# Patient Record
Sex: FEMALE | Race: BLACK OR AFRICAN AMERICAN | Employment: OTHER | URBAN - METROPOLITAN AREA
[De-identification: names, ages, dates, MRNs, and addresses within clinical notes are randomized per-mention and may not be internally consistent; named-entity substitution may affect disease eponyms.]

---

## 2017-01-23 ENCOUNTER — HOSPITAL ENCOUNTER (OUTPATIENT)
Dept: MAMMOGRAPHY | Age: 67
Discharge: HOME | End: 2017-01-23
Attending: FAMILY MEDICINE
Payer: MEDICARE

## 2017-01-23 DIAGNOSIS — Z12.31 VISIT FOR SCREENING MAMMOGRAM: ICD-10-CM

## 2017-01-23 PROCEDURE — 77067 SCR MAMMO BI INCL CAD: CPT

## 2017-07-24 ENCOUNTER — OFFICE VISIT (OUTPATIENT)
Dept: FAMILY MEDICINE CLINIC | Age: 67
End: 2017-07-24

## 2017-07-24 VITALS
SYSTOLIC BLOOD PRESSURE: 173 MMHG | RESPIRATION RATE: 16 BRPM | BODY MASS INDEX: 34.78 KG/M2 | OXYGEN SATURATION: 96 % | HEART RATE: 82 BPM | HEIGHT: 62 IN | WEIGHT: 189 LBS | DIASTOLIC BLOOD PRESSURE: 93 MMHG

## 2017-07-24 DIAGNOSIS — R03.0 WHITE COAT SYNDROME WITHOUT DIAGNOSIS OF HYPERTENSION: ICD-10-CM

## 2017-07-24 DIAGNOSIS — Z13.31 SCREENING FOR DEPRESSION: ICD-10-CM

## 2017-07-24 DIAGNOSIS — E66.9 OBESITY, CLASS I, BMI 30-34.9: ICD-10-CM

## 2017-07-24 DIAGNOSIS — Z00.00 ROUTINE GENERAL MEDICAL EXAMINATION AT A HEALTH CARE FACILITY: Primary | ICD-10-CM

## 2017-07-24 DIAGNOSIS — Z13.39 SCREENING FOR ALCOHOLISM: ICD-10-CM

## 2017-07-24 DIAGNOSIS — Z00.00 HEALTH CARE MAINTENANCE: ICD-10-CM

## 2017-07-24 DIAGNOSIS — E78.5 DYSLIPIDEMIA: ICD-10-CM

## 2017-07-24 NOTE — PROGRESS NOTES
[address] Visit   Patient ID:   Sakshi Acuna is a 79 y.o. female. Assessment/Plan:    Francisco Harris was seen today for establish care and labs. Diagnoses and all orders for this visit:    Routine general medical examination at a health care facility  Screening for alcoholism  Screening for depression  -     Depression Screen Annual  Sakshi Acuna was referred to [employer] today and given information packet. White coat syndrome without diagnosis of hypertension  Reviewed home readings since 2014 which are consistently less than 140/90. No medication needed. Asked to bring home cuff in next visit to compare to office cuff  -     CBC W/O DIFF  -     METABOLIC PANEL, COMPREHENSIVE    Dyslipidemia  Update labs as ordered  -     LIPID PANEL    Health care maintenance  -     CBC W/O DIFF  -     METABOLIC PANEL, COMPREHENSIVE    Obesity, Class I, BMI 30-34.9  Wt down. Continue lifestyle modification       Next visit: labs, check bp with home and office cuff    Counselled pt on:  Patient health concerns, plan as outlined in patient instructions and above. Patient was offered a choice/choices in the treatment plan today. Patient expresses understanding of the plan and agrees with recommendations. Patient Instructions     TODAY, please go to:   CHECK OUT    LAB    Please schedule the following appointments at CHECK OUT:  · Blood pressure and lab follow up with Dr. Hermelinda Bond in approximately 4 weeks or next availabl  _____________________     Today's Plan:  · To lab  · See below  _____________________     Review your health maintenance below. Make plans to return and address anything that is due or will be due soon. Health Maintenance Due   Topic Date Due    DTaP/Tdap/Td  (1 - Tdap) 01/02/2007    Annual Well Visit  06/02/2017    Glaucoma Screening   06/08/2017         _____________________     Are you stressed out? Overwhelmed? In pain?   Feeling disconnected? Consider MINDFULNESS. ..  [email or web address]  _____________________     If you need to get your labs done at [facility], visit this site to find a convenient location: [email or web address]      Eye Exam  Our records show that you are due for an annual eye exam. If you have diabetes, make sure your eye doctor knows so that they take a close look at the back of your eye (retina). If you have not had this done within the year, please schedule to see your eye doctor. After the visit, have your doctor fax us documentation to this office. [address] [facility] [place] -[phone]. If you have already had this done, let us know so we can request it or have your doctor fax us documentation to this office. [address] [facility] [place] -[phone]. Medicare Wellness Visit, Female    The best way to live healthy is to have a healthy lifestyle by eating a well-balanced diet, exercising regularly, limiting alcohol and stopping smoking. Regular physical exams and screening tests are another way to keep healthy. Preventive exams provided by your health care provider can find health problems before they become diseases or illnesses. Preventive services including immunizations, screening tests, monitoring and exams can help you take care of your own health. All people over age 72 should have a pneumovax  and and a prevnar shot to prevent pneumonia. These are once in a lifetime unless you and your provider decide differently. All people over 65 should have a yearly flu shot and a tetanus vaccine every 10 years. A bone mass density to screen for osteoporosis or thinning of the bones should be done every 2 years after 65. Screening for diabetes mellitus with a blood sugar test should be done every year.     Glaucoma is a disease of the eye due to increased ocular pressure that can lead to blindness and it should be done every year by an eye professional.    Cardiovascular screening tests that check for elevated lipids (fatty part of blood) which can lead to heart disease and strokes should be done every 5 years. Colorectal screening that evaluates for blood or polyps in your colon should be done yearly as a stool test or every five years as a flexible sigmoidoscope or every 10 years as a colonoscopy up to age 76. Breast cancer screening with a mammogram is recommended biennially  for women age 54-69. Screening for cervical cancer with a pap smear and pelvic exam is recommended for women after age 72 years every 2 years up to age 79 or when the provider and patient decide to stop. If there is a history of cervical abnormalities or other increased risk for cancer then the test is recommended yearly. Hepatitis C screening is also recommended for anyone born between [birth year] through [address]. A shingles vaccine is also recommended once in a lifetime after age 61. Your Medicare Wellness Exam is recommended annually. Here is a list of your current Health Maintenance items with a due date:  Health Maintenance Due   Topic Date Due    DTaP/Tdap/Td  (1 - Tdap) 01/02/2007    Annual Well Visit  06/02/2017    Glaucoma Screening   06/08/2017         Subjective:   HPI:  Sin Zuleta is a 79 y.o. female being seen for:   Chief Complaint   Patient presents with   [facility]     stated that she started taking a red yeast rice for cholesterol and wants to check her cholesterol. Establish Care  Past medical history, surgical history, social history, family history, medications, allergies reviewed and updated. See below for more detail. · BP usually high at MD.   · Since last visit add red yeast rice, walking, dietary changes, cut back on sugar. · Has a new BP cuff about 2 years.      Screening and Prevention Due:  Health Maintenance Due   Topic Date Due    DTaP/Tdap/Td series (1 - Tdap) 01/02/2007    MEDICARE YEARLY EXAM  06/02/2017    GLAUCOMA SCREENING Q2Y  06/08/2017        Review of Systems  Otherwise as noted in HPI    Active Problem List:  Patient Active Problem List   Diagnosis Code    Dyslipidemia E78.5    Obesity, Class I, BMI 30-34.9 E66.9    White coat syndrome without diagnosis of hypertension R03.0     ? Objective:     Visit Vitals    BP (!) 173/93 (BP 1 Location: Left arm, BP Patient Position: Sitting)    Pulse 82    Resp 16    Ht 5' 2\" (1.575 m)    Wt 189 lb (85.7 kg)    SpO2 96%    BMI 34.57 kg/m2     Wt Readings from Last 3 Encounters:   07/24/17 189 lb (85.7 kg)   06/01/16 193 lb 3.2 oz (87.6 kg)   09/09/15 190 lb (86.2 kg)     BP Readings from Last 3 Encounters:   07/24/17 (!) 173/93   06/01/16 (!) 174/95   09/09/15 (!) 143/91     PHQ over the last two weeks 7/24/2017   Little interest or pleasure in doing things Not at all   Feeling down, depressed or hopeless Not at all   Total Score PHQ 2 0       Physical Exam   Constitutional: She appears well-developed and well-nourished. No distress. Cardiovascular: Normal rate, regular rhythm and normal heart sounds. Exam reveals no gallop and no friction rub. No murmur heard. Pulmonary/Chest: Effort normal and breath sounds normal. No respiratory distress. She has no wheezes. She has no rales. Neurological: She is alert. Psychiatric: She has a normal mood and affect. Her behavior is normal.     No Known Allergies  Prior to Admission medications    Medication Sig Start Date End Date Taking? Authorizing Provider   CALCIUM CARBONATE (CALCIUM 300 PO) Take  by mouth. Yes Historical Provider   RED YEAST RICE PO Take  by mouth. Yes Historical Provider   LACTOBACILLUS ACIDOPHILUS (PROBIOTIC PO) Take  by mouth. Yes Historical Provider   multivitamin (ONE A DAY) tablet Take 1 Tab by mouth daily. Has extra 1000 units of Vitamin D. Yes Historical Provider   cholecalciferol, vitamin d3, (VITAMIN D) 1,000 unit tablet Take 1,000 Units by mouth daily. Yes Historical Provider     . Rosio Robles Social History     Social History    Marital status:      Spouse name: Lisa Martinez. 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 fmr . Multiple brothers and sisters           Past Medical History:   Diagnosis Date    Dyslipidemia 2012    History of acute renal failure 2012    History of chickenpox     History of measles childhood    History of mumps childhood    Obesity, Class I, BMI 30-34.9 2012    White coat syndrome without diagnosis of hypertension     exacerbated by stress       Past Surgical History:   Procedure Laterality Date    BIOPSY BREAST Right 2011    benign     COLONOSCOPY  3/29/2011         HX TOTAL ABDOMINAL HYSTERECTOMY      with Unilateral oopherectomy, b/l salpingectomy due to ectopic pregnancy. OB History      Para Term  AB Living    2    2 0    SAB TAB Ectopic Molar Multiple Live Births      2   0        Obstetric Comments    S/p hyst d/t ectopic  Has one ovaty. H/o abnl pap: per pt, no            Family History   Problem Relation Age of Onset    Heart Disease Mother     Diabetes Mother     Stroke Father 37     heavy alcohol    Liver Disease Brother      cirrhosis    Cancer Neg Hx        . ._____________________   . Arbutus Ring   This is a Subsequent Medicare Annual Wellness Visit providing Personalized Prevention Plan Services (PPPS) (Performed 12 months after initial AWV and PPPS )    I have reviewed the patient's medical history in detail and updated the computerized patient record. History     Past Medical History:   Diagnosis Date    Dyslipidemia 6/19/2012    History of acute renal failure 06/19/2012    History of chickenpox     History of measles childhood    History of mumps childhood    Obesity, Class I, BMI 30-34.9 6/19/2012    White coat syndrome without diagnosis of hypertension 2005    exacerbated by stress      Past Surgical History:   Procedure Laterality Date    BIOPSY BREAST Right 12/06/2011    benign     COLONOSCOPY  3/29/2011         HX TOTAL ABDOMINAL HYSTERECTOMY  1974    with Unilateral oopherectomy, b/l salpingectomy due to ectopic pregnancy. Current Outpatient Prescriptions   Medication Sig Dispense Refill    CALCIUM CARBONATE (CALCIUM 300 PO) Take  by mouth.  RED YEAST RICE PO Take  by mouth.  LACTOBACILLUS ACIDOPHILUS (PROBIOTIC PO) Take  by mouth.  multivitamin (ONE A DAY) tablet Take 1 Tab by mouth daily. Has extra 1000 units of Vitamin D.      cholecalciferol, vitamin d3, (VITAMIN D) 1,000 unit tablet Take 1,000 Units by mouth daily.        No Known Allergies  Family History   Problem Relation Age of Onset    Heart Disease Mother     Diabetes Mother     Stroke Father 37     heavy alcohol    Liver Disease Brother      cirrhosis    Cancer Neg Hx      Social History   Substance Use Topics    Smoking status: Never Smoker    Smokeless tobacco: Never Used    Alcohol use Yes      Comment: occassionally, 1 drink, 1-2 times a year     Patient Active Problem List   Diagnosis Code    Dyslipidemia E78.5    Obesity, Class I, BMI 30-34.9 E66.9    White coat syndrome without diagnosis of hypertension R03.0       Depression Risk Factor Screening:     PHQ over the last two weeks 7/24/2017   Little interest or pleasure in doing things Not at all   Feeling down, depressed or hopeless Not at all   Total Score PHQ 2 0 Alcohol Risk Factor Screening:     History   Alcohol Use    Yes     Comment: occassionally, 1 drink, 1-2 times a year       Functional Ability and Level of Safety:     Hearing Loss   Denies hearing loss      Activities of Daily Living     ADL Assessment 7/24/2017   Feeding yourself No Help Needed   Getting from bed to chair No Help Needed   Getting dressed No Help Needed   Bathing or showering No Help Needed   Walk across the room (includes cane/walker) No Help Needed   Using the telphone No Help Needed   Taking your medications No Help Needed   Preparing meals No Help Needed   Managing money (expenses/bills) No Help Needed   Moderately strenuous housework (laundry) No Help Needed   Shopping for personal items (toiletries/medicines) No Help Needed   Shopping for groceries No Help Needed   Driving No Help Needed   Climbing a flight of stairs No Help Needed   Getting to places beyond walking distances No Help Needed       Fall Risk     Fall Risk Assessment, last 12 mths 7/24/2017   Able to walk? Yes   Fall in past 12 months? No     Abuse Screen     Abuse Screening Questionnaire 7/24/2017   Do you ever feel afraid of your partner? N   Are you in a relationship with someone who physically or mentally threatens you? N   Is it safe for you to go home? Y       Review of Systems   See above    Physical Examination     Evaluation of Cognitive Function:  Mood/affect:  Normal   Appearance: well groomed, casually dressed   No family members present. Patient Care Team:  Johnnie Jimenez MD as PCP - General (Family Practice)    Advice/Referrals/Counseling   Education and counseling provided:  · HM reviewed with due dates: See orders and patient instructions for more  · Personalized Health Advice provided  · Risk factors and management reviewed   · Depression Screening   · Smoking Cessation if applicable  · Alcohol Screening  · ACP Counseling given with patient consent    Health Maintenance Due   Topic Date Due    DTaP/Tdap/Td  (1 - Tdap) 01/02/2007    Annual Well Visit  06/02/2017    Glaucoma Screening   06/08/2017   td   Will see a new eye doctor this year.      .  Immunization History   Administered Date(s) Administered    Pneumococcal Conjugate (PCV-13) 06/10/2015    Pneumococcal Polysaccharide (PPSV-23) 06/01/2016    TD Vaccine 01/01/2007         Assessment/Plan     See above

## 2017-07-24 NOTE — ACP (ADVANCE CARE PLANNING)
Advance Care Planning (ACP) Provider Conversation Snapshot    Date of ACP Conversation: 07/24/17  Persons included in Conversation:  patient  Length of ACP Conversation in minutes:  <16 minutes (Non-Billable)    Authorized Decision Maker (if patient is incapable of making informed decisions):    This person is:             For Patients with Decision Making Capacity:   Values/Goals: Exploration of values, goals, and preferences if recovery is not expected, even with continued medical treatment in the event of:  Imminent death  Severe, permanent brain injury    Conversation Outcomes / Follow-Up Plan:   Recommended completion of Advance Directive form after review of ACP materials and conversation with prospective healthcare agent   Referral made for ACP follow-up assistance to:  Certified ACP Facilitator

## 2017-07-24 NOTE — PROGRESS NOTES
Chief Complaint   Patient presents with   [facility] Varg[facility]     1. Have you been to the ER, urgent care clinic since your last visit? Hospitalized since your last visit? No     2. Have you seen or consulted any other health care providers outside of the [address] [place] [address] since your last visit? Include any pap smears or colon screening. No     The patient was counseled on the dangers of tobacco use, and was advised never to start. Reviewed strategies to maximize success, including never to start. Health Maintenance Due   Topic Date Due    DTaP/Tdap/Td series (1 - Tdap) Discussed with patient today and advised to follow up.    01/02/2007    MEDICARE YEARLY EXAM Discussed with patient today and advised to follow up.    06/02/2017    GLAUCOMA SCREENING Q2Y  Discussed with patient today and advised to follow up.    06/08/2017     ACP is not on file, advised to return.

## 2017-07-24 NOTE — PATIENT INSTRUCTIONS
TODAY, please go to:   CHECK OUT    LAB    Please schedule the following appointments at [address] [place] [address]:  · Blood pressure and lab follow up with Dr. Eliecer Franklin in approximately 4 weeks or next available  · [facility] appointment with Nurse Navigator in next 1-2 months  _____________________     Today's Plan:  · To lab  · See below  _____________________     Review your health maintenance below. Make plans to return and address anything that is due or will be due soon. Health Maintenance Due   Topic Date Due    DTaP/Tdap/Td  (1 - Tdap) 01/02/2007    Annual Well Visit  06/02/2017    Glaucoma Screening   06/08/2017         _____________________     Are you stressed out? Overwhelmed? In pain? Feeling disconnected? Consider MINDFULNESS. ..  [email or web address]  _____________________     If you need to get your labs done at [facility], visit this site to find a convenient location: [email or web address]      Eye Exam  Our records show that you are due for an annual eye exam. If you have diabetes, make sure your eye doctor knows so that they take a close look at the back of your eye (retina). If you have not had this done within the year, please schedule to see your eye doctor. After the visit, have your doctor fax us documentation to this office. [address] [phone]. If you have already had this done, let us know so we can request it or have your doctor fax us documentation to this office. [address] [phone]. Medicare Wellness Visit, Female    The best way to live healthy is to have a healthy lifestyle by eating a well-balanced diet, exercising regularly, limiting alcohol and stopping smoking. Regular physical exams and screening tests are another way to keep healthy. Preventive exams provided by your health care provider can find health problems before they become diseases or illnesses. Preventive services including immunizations, screening tests, monitoring and exams can help you take care of your own health. All people over age 72 should have a pneumovax  and and a prevnar shot to prevent pneumonia. These are once in a lifetime unless you and your provider decide differently. All people over 65 should have a yearly flu shot and a tetanus vaccine every 10 years. A bone mass density to screen for osteoporosis or thinning of the bones should be done every 2 years after 65. Screening for diabetes mellitus with a blood sugar test should be done every year. Glaucoma is a disease of the eye due to increased ocular pressure that can lead to blindness and it should be done every year by an eye professional.    Cardiovascular screening tests that check for elevated lipids (fatty part of blood) which can lead to heart disease and strokes should be done every 5 years. Colorectal screening that evaluates for blood or polyps in your colon should be done yearly as a stool test or every five years as a flexible sigmoidoscope or every 10 years as a colonoscopy up to age 76. Breast cancer screening with a mammogram is recommended biennially  for women age 54-69. Screening for cervical cancer with a pap smear and pelvic exam is recommended for women after age 72 years every 2 years up to age 79 or when the provider and patient decide to stop. If there is a history of cervical abnormalities or other increased risk for cancer then the test is recommended yearly. Hepatitis C screening is also recommended for anyone born between [address] [place] [address] through [place] [address]. A shingles vaccine is also recommended once in a lifetime after age 61. Your Medicare Wellness Exam is recommended annually.     Here is a list of your current Health Maintenance items with a due date:  Health Maintenance Due   Topic Date Due    DTaP/Tdap/Td  (1 - Tdap) 01/02/2007    Annual Well Visit  06/02/2017    Glaucoma Screening 06/08/2017

## 2017-07-24 NOTE — MR AVS SNAPSHOT
Visit Information Date & Time Provider Department Dept. Phone Encounter #  
 7/24/2017  8:00 AM Italo Cleo Ashia Saab MD [place] [phone] [id number] Upcoming Health Maintenance Date Due DTaP/Tdap/Td series (1 - Tdap) 1/2/2007 MEDICARE YEARLY EXAM 6/2/2017 GLAUCOMA SCREENING Q2Y 6/8/2017 INFLUENZA AGE 9 TO ADULT 8/1/2017 BREAST CANCER SCRN MAMMOGRAM 1/23/2019 COLONOSCOPY 3/29/2021 Allergies as of 7/24/2017  Review Complete On: 7/24/2017 By: Lucy Benitez. Ashia Saab MD  
 No Known Allergies Current Immunizations  Reviewed on 6/10/2015 Name Date Pneumococcal Conjugate (PCV-13) 6/10/2015 Pneumococcal Polysaccharide (PPSV-23) 6/1/2016 TD Vaccine 1/1/2007 Not reviewed this visit You Were Diagnosed With   
  
 Codes Comments Routine general medical examination at a health care facility    -  Primary ICD-10-CM: Z00.00 ICD-9-CM: V70.0 White coat syndrome without diagnosis of hypertension     ICD-10-CM: R03.0 ICD-9-CM: 796.2 Screening for alcoholism     ICD-10-CM: Z13.89 ICD-9-CM: V79.1 Screening for depression     ICD-10-CM: Z13.89 ICD-9-CM: V79.0 Dyslipidemia     ICD-10-CM: E78.5 ICD-9-CM: 272.4 Health care maintenance     ICD-10-CM: Z00.00 ICD-9-CM: V70.0 Obesity, Class I, BMI 30-34.9     ICD-10-CM: E66.9 ICD-9-CM: 278.00 Vitals BP Pulse Resp Height(growth percentile) Weight(growth percentile) LMP  
 (!) 173/93 (BP 1 Location: Left arm, BP Patient Position: Sitting) 82 16 5' 2\" (1.575 m) 189 lb (85.7 kg) 01[birth year]01[birth year]1974 SpO2 BMI OB Status Smoking Status 96% 34.57 kg/m2 Hysterectomy Never Smoker Vitals History BMI and BSA Data Body Mass Index Body Surface Area 34.57 kg/m 2 1.94 m 2 Preferred Pharmacy Pharmacy Name Phone [id number] - [facility] [address] [phone] Your Updated Medication List  
  
   
This list is accurate as of: 7/24/17  9:07 AM.  Always use your most recent med list.  
  
  
  
  
 CALCIUM 300 PO Take  by mouth.  
  
 multivitamin tablet Commonly known as:  ONE A DAY Take 1 Tab by mouth daily. Has extra 1000 units of Vitamin D. PROBIOTIC PO Take  by mouth. RED YEAST RICE PO Take  by mouth. VITAMIN D3 1,000 unit tablet Generic drug:  cholecalciferol Take 1,000 Units by mouth daily. We Performed the Following CBC W/O DIFF [76097 CPT(R)] [address] [[id number]] LIPID PANEL [36854 CPT(R)] METABOLIC PANEL, COMPREHENSIVE [18543 CPT(R)] Patient Instructions TODAY, please go to: CHECK OUT  
 LAB Please schedule the following appointments at CHECK OUT: 
· Blood pressure and lab follow up with Dr. Kimmie Novoa in approximately 4 weeks or next available · [facility] appointment with Nurse Navigator in next 1-2 months 
_____________________ Today's Plan: · To lab · See below 
_____________________ Review your health maintenance below. Make plans to return and address anything that is due or will be due soon. Health Maintenance Due Topic Date Due  
 DTaP/Tdap/Td  (1 - Tdap) 01/02/2007 [address] [place] [address] Annual Well Visit  06/02/2017  Glaucoma Screening   06/08/2017  
 
 
 
_____________________ Are you stressed out? Overwhelmed? In pain? Feeling disconnected? Consider MINDFULNESS. .. 
[email or web address] 
_____________________ If you need to get your labs done at [facility], visit this site to find a convenient location: [email or web address] Eye Exam 
Our records show that you are due for an annual eye exam. If you have diabetes, make sure your eye doctor knows so that they take a close look at the back of your eye (retina). If you have not had this done within the year, please schedule to see your eye doctor. After the visit, have your doctor fax us documentation to this office. [address] -[phone]. If you have already had this done, let us know so we can request it or have your doctor fax us documentation to this office. [address] -[phone]. Medicare Wellness Visit, Female The best way to live healthy is to have a healthy lifestyle by eating a well-balanced diet, exercising regularly, limiting alcohol and stopping smoking. Regular physical exams and screening tests are another way to keep healthy. Preventive exams provided by your health care provider can find health problems before they become diseases or illnesses. Preventive services including immunizations, screening tests, monitoring and exams can help you take care of your own health. All people over age 72 should have a pneumovax  and and a prevnar shot to prevent pneumonia. These are once in a lifetime unless you and your provider decide differently. All people over 65 should have a yearly flu shot and a tetanus vaccine every 10 years. A bone mass density to screen for osteoporosis or thinning of the bones should be done every 2 years after 65. Screening for diabetes mellitus with a blood sugar test should be done every year. Glaucoma is a disease of the eye due to increased ocular pressure that can lead to blindness and it should be done every year by an eye professional. 
 
Cardiovascular screening tests that check for elevated lipids (fatty part of blood) which can lead to heart disease and strokes should be done every 5 years. Colorectal screening that evaluates for blood or polyps in your colon should be done yearly as a stool test or every five years as a flexible sigmoidoscope or every 10 years as a colonoscopy up to age 76. Breast cancer screening with a mammogram is recommended biennially  for women age 54-69. Screening for cervical cancer with a pap smear and pelvic exam is recommended for women after age 72 years every 2 years up to age 79 or when the provider and patient decide to stop. If there is a history of cervical abnormalities or other increased risk for cancer then the test is recommended yearly. Hepatitis C screening is also recommended for anyone born between [birth year] through [address]. A shingles vaccine is also recommended once in a lifetime after age 61. Your Medicare Wellness Exam is recommended annually. Here is a list of your current Health Maintenance items with a due date: 
Health Maintenance Due Topic Date Due  
 DTaP/Tdap/Td  (1 - Tdap) 01/02/2007 [facility] Annual Well Visit  06/02/2017  Glaucoma Screening   06/08/2017 Introducing [facility] SERVICES! [employer] Saint introduces [employer] patient portal. Now you can access parts of your medical record, email your doctor's office, and request medication refills online. 1. In your internet browser, go to [email or web address] 2. Click on the First Time User? Click Here link in the Sign In box. You will see the New Member Sign Up page. 3. Enter your [employer] Access Code exactly as it appears below. You will not need to use this code after youve completed the sign-up process. If you do not sign up before the expiration date, you must request a new code. · [employer] Access Code: [email or web address] Expires: 10/22/2017  8:47 AM 
 
4. Enter the last four digits of your Social Security Number (xxxx) and Date of Birth (mm/dd/yyyy) as indicated and click Submit. You will be taken to the next sign-up page. 5. Create a [employer] ID. This will be your [employer] login ID and cannot be changed, so think of one that is secure and easy to remember. 6. Create a Leospheret password. You can change your password at any time. 7. Enter your Password Reset Question and Answer. This can be used at a later time if you forget your password. 8. Enter your e-mail address. You will receive e-mail notification when new information is available in [address] 9. Click Sign Up. You can now view and download portions of your medical record. 10. Click the Download Summary menu link to download a portable copy of your medical information. If you have questions, please visit the Frequently Asked Questions section of the [employer] website. Remember, [employer] is NOT to be used for urgent needs. For medical emergencies, dial 911. Now available from your iPhone and Android! Please provide this summary of care documentation to your next provider. Your primary care clinician is listed as Chica Mejia. If you have any questions after today's visit, please call [phone].

## 2017-07-25 LAB
ALBUMIN SERPL-MCNC: 4.3 G/DL (ref 3.6–4.8)
ALBUMIN/GLOB SERPL: 1.4 {RATIO} (ref 1.2–2.2)
ALP SERPL-CCNC: 100 IU/L (ref 39–117)
ALT SERPL-CCNC: 13 IU/L (ref 0–32)
AST SERPL-CCNC: 17 IU/L (ref 0–40)
BILIRUB SERPL-MCNC: 0.3 MG/DL (ref 0–1.2)
BUN SERPL-MCNC: 19 MG/DL (ref 8–27)
BUN/CREAT SERPL: 15 (ref 12–28)
CALCIUM SERPL-MCNC: 10.1 MG/DL (ref 8.7–10.3)
CHLORIDE SERPL-SCNC: 100 MMOL/L (ref 96–106)
CHOLEST SERPL-MCNC: 214 MG/DL (ref 100–199)
CO2 SERPL-SCNC: 26 MMOL/L (ref 18–29)
CREAT SERPL-MCNC: 1.27 MG/DL (ref 0.57–1)
ERYTHROCYTE [DISTWIDTH] IN BLOOD BY AUTOMATED COUNT: 15.7 % (ref 12.3–15.4)
GLOBULIN SER CALC-MCNC: 3.1 G/DL (ref 1.5–4.5)
GLUCOSE SERPL-MCNC: 92 MG/DL (ref 65–99)
HCT VFR BLD AUTO: 45.2 % (ref 34–46.6)
HDLC SERPL-MCNC: 56 MG/DL
HGB BLD-MCNC: 14.7 G/DL (ref 11.1–15.9)
INTERPRETATION, 910389: NORMAL
INTERPRETATION: NORMAL
LDLC SERPL CALC-MCNC: 135 MG/DL (ref 0–99)
MCH RBC QN AUTO: 27.5 PG (ref 26.6–33)
MCHC RBC AUTO-ENTMCNC: 32.5 G/DL (ref 31.5–35.7)
MCV RBC AUTO: 85 FL (ref 79–97)
PDF IMAGE, 910387: NORMAL
PLATELET # BLD AUTO: 182 X10E3/UL (ref 150–379)
POTASSIUM SERPL-SCNC: 4.8 MMOL/L (ref 3.5–5.2)
PROT SERPL-MCNC: 7.4 G/DL (ref 6–8.5)
RBC # BLD AUTO: 5.35 X10E6/UL (ref 3.77–5.28)
SODIUM SERPL-SCNC: 141 MMOL/L (ref 134–144)
TRIGL SERPL-MCNC: 116 MG/DL (ref 0–149)
VLDLC SERPL CALC-MCNC: 23 MG/DL (ref 5–40)
WBC # BLD AUTO: 5 X10E3/UL (ref 3.4–10.8)

## 2017-07-26 NOTE — PROGRESS NOTES
Will review results in detail at upcoming office visit. 9/19/2017  10:40 AM   Laverne Comment.  Calixto Peters MD       [address] [place] [address]

## 2017-09-19 ENCOUNTER — OFFICE VISIT (OUTPATIENT)
Dept: FAMILY MEDICINE CLINIC | Age: 67
End: 2017-09-19

## 2017-09-19 VITALS
HEIGHT: 62 IN | WEIGHT: 185 LBS | BODY MASS INDEX: 34.04 KG/M2 | OXYGEN SATURATION: 96 % | RESPIRATION RATE: 18 BRPM | HEART RATE: 73 BPM | SYSTOLIC BLOOD PRESSURE: 135 MMHG | DIASTOLIC BLOOD PRESSURE: 94 MMHG | TEMPERATURE: 97.6 F

## 2017-09-19 DIAGNOSIS — E78.5 DYSLIPIDEMIA: ICD-10-CM

## 2017-09-19 DIAGNOSIS — E66.9 OBESITY, CLASS I, BMI 30-34.9: ICD-10-CM

## 2017-09-19 DIAGNOSIS — R03.0 WHITE COAT SYNDROME WITHOUT DIAGNOSIS OF HYPERTENSION: ICD-10-CM

## 2017-09-19 DIAGNOSIS — N18.30 CKD (CHRONIC KIDNEY DISEASE) STAGE 3, GFR 30-59 ML/MIN (HCC): Primary | ICD-10-CM

## 2017-09-19 LAB
BILIRUB UR QL STRIP: NEGATIVE
GLUCOSE UR-MCNC: NEGATIVE MG/DL
KETONES P FAST UR STRIP-MCNC: NEGATIVE MG/DL
PH UR STRIP: 5 [PH] (ref 4.6–8)
PROT UR QL STRIP: NEGATIVE MG/DL
SP GR UR STRIP: 1.01 (ref 1–1.03)
UA UROBILINOGEN AMB POC: NORMAL (ref 0.2–1)
URINALYSIS CLARITY POC: CLEAR
URINALYSIS COLOR POC: NORMAL
URINE BLOOD POC: NEGATIVE
URINE LEUKOCYTES POC: NORMAL
URINE NITRITES POC: NEGATIVE

## 2017-09-19 NOTE — LETTER
9/19/2017 10:57 AM 
 
Ms. Rudy Jacobson [address] [address] Office Visit on 07/24/2017 Component Date Value Ref Range Status  WBC 07/24/2017 5.0  3.4 - 10.8 x10E3/uL Final  
 RBC 07/24/2017 5.35* 3.77 - 5.28 x10E6/uL Final  
 HGB 07/24/2017 14.7  11.1 - 15.9 g/dL Final  
 HCT 07/24/2017 45.2  34.0 - 46.6 % Final  
 MCV 07/24/2017 85  79 - 97 fL Final  
 MCH 07/24/2017 27.5  26.6 - 33.0 pg Final  
 MCHC 07/24/2017 32.5  31.5 - 35.7 g/dL Final  
 RDW 07/24/2017 15.7* 12.3 - 15.4 % Final  
 PLATELET 35/77/1714 430  150 - 379 x10E3/uL Final  
 Glucose 07/24/2017 92  65 - 99 mg/dL Final  
 BUN 07/24/2017 19  8 - 27 mg/dL Final  
 Creatinine 07/24/2017 1.27* 0.57 - 1.00 mg/dL Final  
 GFR est non-AA 07/24/2017 44* >59 mL/min/1.73 Final  
 GFR est AA 07/24/2017 50* >59 mL/min/1.73 Final  
 BUN/Creatinine ratio 07/24/2017 15  12 - 28 Final  
 Sodium 07/24/2017 141  134 - 144 mmol/L Final  
 Potassium 07/24/2017 4.8  3.5 - 5.2 mmol/L Final  
 Chloride 07/24/2017 100  96 - 106 mmol/L Final  
 CO2 07/24/2017 26  18 - 29 mmol/L Final  
 Calcium 07/24/2017 10.1  8.7 - 10.3 mg/dL Final  
 Protein, total 07/24/2017 7.4  6.0 - 8.5 g/dL Final  
 Albumin 07/24/2017 4.3  3.6 - 4.8 g/dL Final  
 GLOBULIN, TOTAL 07/24/2017 3.1  1.5 - 4.5 g/dL Final  
 A-G Ratio 07/24/2017 1.4  1.2 - 2.2 Final  
 Bilirubin, total 07/24/2017 0.3  0.0 - 1.2 mg/dL Final  
 Alk.  phosphatase 07/24/2017 100  39 - 117 IU/L Final  
 AST (SGOT) 07/24/2017 17  0 - 40 IU/L Final  
 ALT (SGPT) 07/24/2017 13  0 - 32 IU/L Final  
 Cholesterol, total 07/24/2017 214* 100 - 199 mg/dL Final  
 Triglyceride 07/24/2017 116  0 - 149 mg/dL Final  
 HDL Cholesterol 07/24/2017 56  >39 mg/dL Final  
 VLDL, calculated 07/24/2017 23  5 - 40 mg/dL Final  
 LDL, calculated 07/24/2017 135* 0 - 99 mg/dL Final  
 INTERPRETATION 07/24/2017 NTAP   Final  
 PDF IMAGE 79/56/3540 Not applicable   Final  
  Interpretation 07/24/2017 Note   Final  
 
 
 
Legend: Use this to help understand your labs. You may not have all of these ordered · WBC- White blood cell count · HGB- Hemoglobin, red blood cell count · HCT- Hematocrit, red blood cell count · PLT- Platelets · Sodium, Potassium, Chloride, Magnesium- electrolytes · Creatinine, GFR- kidney function · AST, ALT, Alkaline phosphatase, bilirubin- liver and gallbladder · Lipase- pancreas · RPR- Syphilis test, STD 
· HIV, Gonorrhea, Chlamydia, Trichomonas- STDs · Candida- yeast infection · Nuswab- vaginal infections · Urinalysis- a quick test about your urine · Hemoglobin A1C- diabetes test 
· Glucose- blood sugar · HDL- \"Good\" Cholesterol. Goal >=40 
· LDL- \"Bad\" Cholesterol. Goal <100 · Triglycerides- Goal <150 · Vit D- Vitamin D level · TSH, FT3, FT4- thyroid tests · HCV Ab- test for Hepatitis C  
· Sincerely, 
 
 
Olegario Washington MD

## 2017-09-19 NOTE — PROGRESS NOTES
.. [address] [place] [address] Visit   09/19/2017  Patient ID: Jennifer Camejo is a 79 y.o. female. Assessment/Plan:    Diagnoses and all orders for this visit:    1. CKD (chronic kidney disease) stage 3, GFR 30-59 ml/min  Discussed today. Does not have essential HTN or DM. Stable CKD 2-3 for the last 2 years. Refer to nephrology for additional evaluation.   -     AMB POC URINALYSIS DIP STICK MANUAL W/O MICRO  -     REFERRAL TO NEPHROLOGY    2. White coat syndrome without diagnosis of hypertension  Elevated in office only. CTM home BP.     3. Obesity, Class I, BMI 30-34.9  Congratulated on weight loss    4. Dyslipidemia  Declines statin. Educated about food, physical activity, and lifestyle choices. Given handout on AHA, mediterranean and DASH diet recommendations. Counselled pt on Patient health concerns and plans. Patient was offered a choice/choices in the treatment plan today. Reviewed return precautions as appropriate. Patient expresses understanding of the plan and agrees with recommendations. See patient instructions for more. Patient Instructions   . Tamera [place]aks TODAY, please go to:   CHECK OUT    Urine sample    Please schedule the following appointments at CHECK OUT:  · Blood pressure follow up with Dr. Rebekah Mejia in March 2018    Today's Plan:  - Flu season is coming! Remember to get your flu vaccine! Subjective:   HPI:  Jennifer aCmejo is a 79 y.o. female being seen for:   Chief Complaint   Patient presents with    Blood Pressure Check     follow up     Results     labs        White coat HTN  · Home BPs, last 135/80s  · Thinks it is because she dislikes coming to doctor office  · She was briefly on ?hctz and did not feel well on it. · Never 140s SBP ot 90s DBP at home. · Does not want to do a statin  · Would like to focus on lifestyle change and exercise.    · 10 year ascvd 10.9%  · Chronic kidney disease  · Walks 1 hour twice per week    Screening and Prevention Due:  Health Maintenance Due   Topic Date Due    DTaP/Tdap/Td series (1 - Tdap) 01/02/2007    GLAUCOMA SCREENING Q2Y  06/08/2017    INFLUENZA AGE 9 TO ADULT  08/01/2017         Review of Systems  Otherwise as noted in HPI  ? Objective:     Visit Vitals    BP (!) 135/94    Pulse 73    Temp 97.6 °F (36.4 °C) (Oral)    Resp 18    Ht 5' 2\" (1.575 m)    Wt 185 lb (83.9 kg)    SpO2 96%    BMI 33.84 kg/m2     Wt Readings from Last 3 Encounters:   09/19/17 185 lb (83.9 kg)   07/24/17 189 lb (85.7 kg)   06/01/16 193 lb 3.2 oz (87.6 kg)     BP Readings from Last 3 Encounters:   09/19/17 (!) 135/94   07/24/17 (!) 173/93   06/01/16 (!) 174/95     PHQ over the last two weeks 9/19/2017   Little interest or pleasure in doing things Not at all   Feeling down, depressed or hopeless Not at all   Total Score PHQ 2 0       Physical Exam   Constitutional: She appears well-developed and well-nourished. No distress. Pulmonary/Chest: Effort normal. No respiratory distress. Neurological: She is alert. Psychiatric: She has a normal mood and affect. Her behavior is normal.       No Known Allergies  Prior to Admission medications    Medication Sig Start Date End Date Taking? Authorizing Provider   CALCIUM CARBONATE (CALCIUM 300 PO) Take  by mouth. Yes Historical Provider   RED YEAST RICE PO Take  by mouth. Yes Historical Provider   LACTOBACILLUS ACIDOPHILUS (PROBIOTIC PO) Take  by mouth. Yes Historical Provider   multivitamin (ONE A DAY) tablet Take 1 Tab by mouth daily. Has extra 1000 units of Vitamin D. Yes Historical Provider   cholecalciferol, vitamin d3, (VITAMIN D) 1,000 unit tablet Take 1,000 Units by mouth daily. Yes Historical Provider     Patient Active Problem List   Diagnosis Code    Dyslipidemia E78.5    Obesity, Class I, BMI 30-34.9 E66.9    White coat syndrome without diagnosis of hypertension R03.0     .   Family History   Problem Relation Age of Onset    Heart Disease Mother     Diabetes Mother     Stroke Father 37     heavy alcohol    Liver Disease Brother      cirrhosis    Cancer Neg Hx

## 2017-09-19 NOTE — LETTER
9/19/2017 11:43 AM 
 
Ms. Grace Klein [address] [address] Results for Dallas Rehman (MRN [id number]) as of 9/19/2017 11:20 Ref. Range 6/10/2015 15:41 9/9/2015 10:45 6/1/2016 10:47 7/24/2017 09:30 Sodium Latest Ref Range: 134 - 144 mmol/L 139 142 141 141 Potassium Latest Ref Range: 3.5 - 5.2 mmol/L 4.5 4.6 4.9 4.8 Chloride Latest Ref Range: 96 - 106 mmol/L 99 101 100 100 CO2 Latest Ref Range: 18 - 29 mmol/L 24 24 24 26 Glucose Latest Ref Range: 65 - 99 mg/dL 86 97 93 92 BUN Latest Ref Range: 8 - 27 mg/dL 20 16 13 19 Creatinine Latest Ref Range: 0.57 - 1.00 mg/dL 1.08 (H) 1.31 (H) 1.09 (H) 1.27 (H) BUN/Creatinine ratio Latest Ref Range: 12 - 28  19 12 12 15 Calcium Latest Ref Range: 8.7 - 10.3 mg/dL 10.5 (H) 10.3 10.0 10.1 GFR est non-AA Latest Ref Range: >59 mL/min/1.73 54 (L) 43 (L) 53 (L) 44 (L)  
GFR est AA Latest Ref Range: >59 mL/min/1.73 62 49 (L) 61 50 (L) Bilirubin, total Latest Ref Range: 0.0 - 1.2 mg/dL 0.3 0.5 0.4 0.3 Protein, total Latest Ref Range: 6.0 - 8.5 g/dL 7.8 7.7 7.5 7.4 Albumin Latest Ref Range: 3.6 - 4.8 g/dL 4.8 4.6 4.5 4.3 A-G Ratio Latest Ref Range: 1.2 - 2.2  1.6 1.5 1.5 1.4 ALT (SGPT) Latest Ref Range: 0 - 32 IU/L 16 9 11 13 AST Latest Ref Range: 0 - 40 IU/L 17 15 18 17 Alk. phosphatase Latest Ref Range: 39 - 117 IU/L 110 95 107 100 Triglyceride Latest Ref Range: 0 - 149 mg/dL 112 107 102 116 Cholesterol, total Latest Ref Range: 100 - 199 mg/dL 261 (H) 254 (H) 248 (H) 214 (H) HDL Cholesterol Latest Ref Range: >39 mg/dL 57 58 65 56 LDL, calculated Latest Ref Range: 0 - 99 mg/dL 182 (H) 175 (H) 163 (H) 135 (H) VLDL, calculated Latest Ref Range: 5 - 40 mg/dL 22 21 20 23

## 2017-09-19 NOTE — PROGRESS NOTES
Chief Complaint   Patient presents with    Blood Pressure Check     follow up     Results     labs      1. Have you been to the ER, urgent care clinic since your last visit? Hospitalized since your last visit? No     2. Have you seen or consulted any other health care providers outside of the [address] [place] [address] since your last visit? Include any pap smears or colon screening. No     The patient was counseled on the dangers of tobacco use, and was advised to never start. Reviewed strategies to maximize success, including never to start. Health Maintenance Due   Topic Date Due    DTaP/Tdap/Td series (1 - Tdap) Discussed with patient today and advised to follow up.    01/02/2007    GLAUCOMA SCREENING Q2Y Discussed with patient today and advised to follow up.    06/08/2017    INFLUENZA AGE 9 TO ADULT Discussed with patient today and advised to follow up.    08/01/2017     ACP is not on file, advised to return.

## 2017-09-19 NOTE — PATIENT INSTRUCTIONS
.. TODAY, please go to:   CHECK OUT    Urine sample    Please schedule the following appointments at CHECK OUT:  · Blood pressure follow up with Dr. Cameron Yoder in March 2018    Today's Plan:  - Flu season is coming! Remember to get your flu vaccine! Chronic Kidney Disease: Care Instructions  Your Care Instructions  Chronic kidney disease happens when your kidneys don't work as well as they should. Your kidneys have a few important jobs. They remove waste from your blood. This waste leaves your body in your urine. They also balance your body's fluids and chemicals. When your kidneys don't work well, extra waste and fluid can build up. This can poison the body and sometimes cause death. The most common causes of this disease are diabetes and high blood pressure. In some cases, the disease develops in 2 to 3 months. But it usually develops over many years. If you take medicine and make healthy changes to your lifestyle, you may be able to prevent the disease from getting worse. But if your kidney damage gets worse, you may need dialysis or a kidney transplant. Dialysis uses a machine to filter waste from the blood. A transplant is surgery to give you a healthy kidney from another person. Follow-up care is a key part of your treatment and safety. Be sure to make and go to all appointments, and call your doctor if you are having problems. It's also a good idea to know your test results and keep a list of the medicines you take. How can you care for yourself at home? Treatments and appointments  · Be safe with medicines. Take your medicines exactly as prescribed. Call your doctor if you have any problems with your medicine. You also may take medicine to control your blood pressure or to treat diabetes. Many people who have diabetes take blood pressure medicine. · If you have diabetes, do your best to keep your blood sugar in your target range. You may do this by eating healthy food and exercising.  You may also take medicines. · Go to your dialysis appointments if you have this treatment. · Do not take ibuprofen (Advil, Motrin), naproxen (Aleve), or similar medicines, unless your doctor tells you to. These may make the disease worse. · Do not take any vitamins, over-the-counter medicines, or herbal products without talking to your doctor first.  · Do not smoke or use other tobacco products. Smoking can reduce blood flow to the kidneys. If you need help quitting, talk to your doctor about stop-smoking programs and medicines. These can increase your chances of quitting for good. · Do not drink alcohol or use illegal drugs. · Talk to your doctor about an exercise plan. Exercise helps lower your blood pressure. It also makes you feel better. · If you have an advance directive, let your doctor know. It may include a living will and a durable power of  for health care. If you don't have one, you may want to prepare one. It lets your doctor and loved ones know your health care wishes if you become unable to speak for yourself. Diet  · Talk to a registered dietitian. He or she can help you make a meal plan that is right for you. Most people with kidney disease need to limit salt (sodium), fluids, and protein. Some also have to limit potassium and phosphorus. · You may have to give up many foods you like. But try to focus on the fact that this will help you stay healthy for as long as possible. · If you have a hard time eating enough, talk to your doctor or dietitian about ways to add calories to your diet. · Your diet may change as your disease changes. See your doctor for regular testing. And work with a dietitian to change your diet as needed. When should you call for help? Call 911 anytime you think you may need emergency care. For example, call if:  · You passed out (lost consciousness). Call your doctor now or seek immediate medical care if:  · You have less urine than normal or no urine.   · You have trouble urinating or can urinate only very small amounts. · You are confused or have trouble thinking clearly. · You feel weaker or more tired than usual.  · You are very thirsty, lightheaded, or dizzy. · You have nausea and vomiting. · You have new swelling of your arms or feet, or your swelling is worse. · You have blood in your urine. · You have new or worse trouble breathing. Watch closely for changes in your health, and be sure to contact your doctor if:  · You have any problems with your medicine or other treatment. Where can you learn more? Go to http://chay-caroline.info/. Enter N276 in the search box to learn more about \"Chronic Kidney Disease: Care Instructions. \"  Current as of: April 3, 2017  Content Version: 11.3  © 3681-7760 [employer]. Care instructions adapted under license by [employer] (which disclaims liability or warranty for this information). If you have questions about a medical condition or this instruction, always ask your healthcare professional. [place] [address] any warranty or liability for your use of this information.

## 2017-09-19 NOTE — MR AVS SNAPSHOT
Visit Information Date & Time Provider Department Dept. Phone Encounter #  
 9/19/2017 10:40 AM Lucy Benitez. Ashia Saab MD [facility] [phone] [id number] Upcoming Health Maintenance Date Due DTaP/Tdap/Td series (1 - Tdap) 1/2/2007 GLAUCOMA SCREENING Q2Y 6/8/2017 INFLUENZA AGE 9 TO ADULT 8/1/2017 MEDICARE YEARLY EXAM 7/25/2018 BREAST CANCER SCRN MAMMOGRAM 1/23/2019 COLONOSCOPY 3/29/2021 Allergies as of 9/19/2017  Review Complete On: 9/19/2017 By: Sesar Cruz LPN No Known Allergies Current Immunizations  Reviewed on 6/10/2015 Name Date Pneumococcal Conjugate (PCV-13) 6/10/2015 Pneumococcal Polysaccharide (PPSV-23) 6/1/2016 TD Vaccine 1/1/2007 Not reviewed this visit You Were Diagnosed With   
  
 Codes Comments CKD (chronic kidney disease) stage 3, GFR 30-59 ml/min    -  Primary ICD-10-CM: N18.3 ICD-9-CM: 829. 3 White coat syndrome without diagnosis of hypertension     ICD-10-CM: R03.0 ICD-9-CM: 796.2 Obesity, Class I, BMI 30-34.9     ICD-10-CM: E66.9 ICD-9-CM: 278.00 Dyslipidemia     ICD-10-CM: E78.5 ICD-9-CM: 272.4 Vitals BP Pulse Temp Resp Height(growth percentile) Weight(growth percentile) (!) 135/94 73 97.6 °F (36.4 °C) (Oral) 18 5' 2\" (1.575 m) 185 lb (83.9 kg) LMP SpO2 BMI OB Status Smoking Status 01/01/1974 96% 33.84 kg/m2 Hysterectomy Never Smoker Vitals History BMI and BSA Data Body Mass Index Body Surface Area  
 33.84 kg/m 2 1.92 m 2 Preferred Pharmacy Pharmacy Name Phone [id number] [address] [address] [phone] Your Updated Medication List  
  
   
This list is accurate as of: 9/19/17 11:45 AM.  Always use your most recent med list.  
  
  
  
  
 CALCIUM 300 PO Take  by mouth.  
  
 multivitamin tablet Commonly known as:  ONE A DAY  
 Take 1 Tab by mouth daily. Has extra 1000 units of Vitamin D. PROBIOTIC PO Take  by mouth. RED YEAST RICE PO Take  by mouth. VITAMIN D3 1,000 unit tablet Generic drug:  cholecalciferol Take 1,000 Units by mouth daily. We Performed the Following AMB POC URINALYSIS DIP STICK MANUAL W/O MICRO [00550 CPT(R)] REFERRAL TO NEPHROLOGY [CKR10 Custom] Comments:  
 Please evaluate patient for CKD, eval possible etiologies. No DM, has white coat HTN. [address] [place], [address] 
[phone] Fax: [phone] Referral Information Referral ID Referred By Referred To  
  
 [id number] Wendi Finn MD   
   [address] [place] [address] Phone: [phone] Fax: [phone] Visits Status Start Date End Date 1 New Request 9/19/17 9/19/18 If your referral has a status of pending review or denied, additional information will be sent to support the outcome of this decision. Patient Instructions Vinny Quevedo TODAY, please go to: CHECK OUT Urine sample Please schedule the following appointments at [address] [place] [address]: 
· Blood pressure follow up with Dr. Erica Parker in March 2018 Today's Plan: - Flu season is coming! Remember to get your flu vaccine! Chronic Kidney Disease: Care Instructions Your Care Instructions Chronic kidney disease happens when your kidneys don't work as well as they should. Your kidneys have a few important jobs. They remove waste from your blood. This waste leaves your body in your urine. They also balance your body's fluids and chemicals. When your kidneys don't work well, extra waste and fluid can build up. This can poison the body and sometimes cause death. The most common causes of this disease are diabetes and high blood pressure. In some cases, the disease develops in 2 to 3 months. But it usually develops over many years. If you take medicine and make healthy changes to your lifestyle, you may be able to prevent the disease from getting worse. But if your kidney damage gets worse, you may need dialysis or a kidney transplant. Dialysis uses a machine to filter waste from the blood. A transplant is surgery to give you a healthy kidney from another person. Follow-up care is a key part of your treatment and safety. Be sure to make and go to all appointments, and call your doctor if you are having problems. It's also a good idea to know your test results and keep a list of the medicines you take. How can you care for yourself at home? Treatments and appointments · Be safe with medicines. Take your medicines exactly as prescribed. Call your doctor if you have any problems with your medicine. You also may take medicine to control your blood pressure or to treat diabetes. Many people who have diabetes take blood pressure medicine. · If you have diabetes, do your best to keep your blood sugar in your target range. You may do this by eating healthy food and exercising. You may also take medicines. · Go to your dialysis appointments if you have this treatment. · Do not take ibuprofen (Advil, Motrin), naproxen (Aleve), or similar medicines, unless your doctor tells you to. These may make the disease worse. · Do not take any vitamins, over-the-counter medicines, or herbal products without talking to your doctor first. 
· Do not smoke or use other tobacco products. Smoking can reduce blood flow to the kidneys. If you need help quitting, talk to your doctor about stop-smoking programs and medicines. These can increase your chances of quitting for good. · Do not drink alcohol or use illegal drugs. · Talk to your doctor about an exercise plan. Exercise helps lower your blood pressure. It also makes you feel better. · If you have an advance directive, let your doctor know.  It may include a living will and a durable power of  for health care. If you don't have one, you may want to prepare one. It lets your doctor and loved ones know your health care wishes if you become unable to speak for yourself. Diet · Talk to a registered dietitian. He or she can help you make a meal plan that is right for you. Most people with kidney disease need to limit salt (sodium), fluids, and protein. Some also have to limit potassium and phosphorus. · You may have to give up many foods you like. But try to focus on the fact that this will help you stay healthy for as long as possible. · If you have a hard time eating enough, talk to your doctor or dietitian about ways to add calories to your diet. · Your diet may change as your disease changes. See your doctor for regular testing. And work with a dietitian to change your diet as needed. When should you call for help? Call 911 anytime you think you may need emergency care. For example, call if: 
· You passed out (lost consciousness). Call your doctor now or seek immediate medical care if: 
· You have less urine than normal or no urine. · You have trouble urinating or can urinate only very small amounts. · You are confused or have trouble thinking clearly. · You feel weaker or more tired than usual. 
· You are very thirsty, lightheaded, or dizzy. · You have nausea and vomiting. · You have new swelling of your arms or feet, or your swelling is worse. · You have blood in your urine. · You have new or worse trouble breathing. Watch closely for changes in your health, and be sure to contact your doctor if: 
· You have any problems with your medicine or other treatment. Where can you learn more? Go to http://chay-caroline.info/. Enter N276 in the search box to learn more about \"Chronic Kidney Disease: Care Instructions. \" Current as of: April 3, 2017 Content Version: 11.3 © 6461-9476 [employer]. Care instructions adapted under license by [employer] (which disclaims liability or warranty for this information). If you have questions about a medical condition or this instruction, always ask your healthcare professional. [address] any warranty or liability for your use of this information. Introducing [facility] & HEALTH SERVICES! Zohra Monteiro introduces [employer] patient portal. Now you can access parts of your medical record, email your doctor's office, and request medication refills online. 1. In your internet browser, go to [email or web address] 2. Click on the First Time User? Click Here link in the Sign In box. You will see the New Member Sign Up page. 3. Enter your [employer] Access Code exactly as it appears below. You will not need to use this code after youve completed the sign-up process. If you do not sign up before the expiration date, you must request a new code. · [employer] Access Code: [email or web address] Expires: 10/22/2017  8:47 AM 
 
4. Enter the last four digits of your Social Security Number (xxxx) and Date of Birth (mm/dd/yyyy) as indicated and click Submit. You will be taken to the next sign-up page. 5. Create a [employer] ID. This will be your [employer] login ID and cannot be changed, so think of one that is secure and easy to remember. 6. Create a [employer] password. You can change your password at any time. 7. Enter your Password Reset Question and Answer. This can be used at a later time if you forget your password. 8. Enter your e-mail address. You will receive e-mail notification when new information is available in [address] 9. Click Sign Up. You can now view and download portions of your medical record. 10. Click the Download Summary menu link to download a portable copy of your medical information.  
 
If you have questions, please visit the Frequently Asked Questions section of the [employer]. Remember, P[employer] is NOT to be used for urgent needs. For medical emergencies, dial 911. Now available from your iPhone and Android! Please provide this summary of care documentation to your next provider. Your primary care clinician is listed as Maria Guadalupe Castro. If you have any questions after today's visit, please call [phone].

## 2018-01-24 ENCOUNTER — HOSPITAL ENCOUNTER (OUTPATIENT)
Dept: MAMMOGRAPHY | Age: 68
Discharge: HOME | End: 2018-01-24
Attending: NURSE PRACTITIONER
Payer: MEDICARE

## 2018-01-24 DIAGNOSIS — Z12.31 VISIT FOR SCREENING MAMMOGRAM: ICD-10-CM

## 2018-01-24 PROCEDURE — 77067 SCR MAMMO BI INCL CAD: CPT

## 2018-04-19 ENCOUNTER — OFFICE VISIT (OUTPATIENT)
Dept: FAMILY MEDICINE CLINIC | Age: 68
End: 2018-04-19

## 2018-04-19 VITALS
DIASTOLIC BLOOD PRESSURE: 84 MMHG | SYSTOLIC BLOOD PRESSURE: 138 MMHG | RESPIRATION RATE: 18 BRPM | BODY MASS INDEX: 35.24 KG/M2 | WEIGHT: 191.5 LBS | HEART RATE: 101 BPM | OXYGEN SATURATION: 97 % | TEMPERATURE: 98 F | HEIGHT: 62 IN

## 2018-04-19 DIAGNOSIS — R03.0 WHITE COAT SYNDROME WITHOUT DIAGNOSIS OF HYPERTENSION: Primary | ICD-10-CM

## 2018-04-19 DIAGNOSIS — Z23 ENCOUNTER FOR IMMUNIZATION: ICD-10-CM

## 2018-04-19 NOTE — PROGRESS NOTES
Chief Complaint   Patient presents with    Hypertension     follow up     1. Have you been to the ER, urgent care clinic since your last visit? Hospitalized since your last visit? NO    2. Have you seen or consulted any other health care providers outside of the [facility] since your last visit? Include any pap smears or colon screening.  NO

## 2018-04-19 NOTE — PATIENT INSTRUCTIONS
TODAY, please go to:      CHECK OUT - If you received a referral, Show the     Please schedule the following appointments at [address] [place] [address]:  · Lab visit, fasting, the week before our visit. · Medicare wellness visit and lab visit with Dr. Alhaji Rivera in late July 2018 (40 min)      Continue to check BP at home     Tdap vaccine  Please get a tetanus plus pertussis (whooping cough) vaccine at your pharmacy. This is also known as the Tdap vaccine. Ask the pharmacist to tell you what your copay will be. If you need to you can call your insurance company to ask more questions. After you get the vaccine, have the pharmacist fax in documentation to the office. Shingles vaccine  Please get a shingles vaccine at your pharmacy. This is also called SHINGRIX. Ask the pharmacist to tell you what your copay will be. If you need to you can call your insurance company to ask more questions. After you get the vaccine, have the pharmacist fax in documentation to the office.

## 2018-04-19 NOTE — MR AVS SNAPSHOT
[address] 
[address] [place] [address] 
[phone] Patient: Juan Barlow MRN: [id number] VQP:[id number] Visit Information Date & Time Provider Department Dept. Phone Encounter #  
 4/19/2018  3:00 PM Jesse Robles. Tomas Núñez MD [facility] [phone] [id number] Upcoming Health Maintenance Date Due DTaP/Tdap/Td series (1 - Tdap) 1/2/2007 Influenza Age 5 to Adult 8/1/2017 MEDICARE YEARLY EXAM 7/25/2018 GLAUCOMA SCREENING Q2Y 11/1/2019 BREAST CANCER SCRN MAMMOGRAM 1/24/2020 COLONOSCOPY 3/29/2021 Allergies as of 4/19/2018  Review Complete On: 4/19/2018 By: Sparkle Styles LPN No Known Allergies Current Immunizations  Reviewed on 6/10/2015 Name Date Pneumococcal Conjugate (PCV-13) 6/10/2015 Pneumococcal Polysaccharide (PPSV-23) 6/1/2016 TD Vaccine 1/1/2007 Not reviewed this visit Vitals BP Pulse Temp Resp Height(growth percentile) Weight(growth percentile) 138/84 (BP 1 Location: Left arm, BP Patient Position: Sitting) (!) 101 98 °F (36.7 °C) (Oral) 18 5' 2\" (1.575 m) 191 lb 8 oz (86.9 kg) LMP SpO2 BMI OB Status Smoking Status 01/01/1974 97% 35.03 kg/m2 Hysterectomy Never Smoker Vitals History BMI and BSA Data Body Mass Index Body Surface Area 35.03 kg/m 2 1.95 m 2 Preferred Pharmacy [facility] Name Phone [id number] [place] [address] [phone] Your Updated Medication List  
  
   
This list is accurate as of 4/19/18  4:51 PM.  Always use your most recent med list.  
  
  
  
  
 CALCIUM 300 PO Take  by mouth.  
  
 multivitamin tablet Commonly known as:  ONE A DAY Take 1 Tab by mouth daily. Has extra 1000 units of Vitamin D.  
  
 NUTRITIONAL SHAKE Liqd Generic drug:  food supplemt, lactose-reduced Take 237 mL by mouth every Monday, Wednesday, Friday. PROBIOTIC PO Take  by mouth. RED YEAST RICE PO Take  by mouth. VITAMIN D3 1,000 unit tablet Generic drug:  cholecalciferol Take 1,000 Units by mouth daily. Patient Instructions TODAY, please go to: CHECK OUT - If you received a referral, Show the  Please schedule the following appointments at [facility] OUT: 
· Lab visit, fasting, the week before our visit. · Medicare wellness visit and lab visit with Dr. Irma Hill in late July 2018 (40 min) Continue to check BP at home Tdap vaccine Please get a tetanus plus pertussis (whooping cough) vaccine at your pharmacy. This is also known as the Tdap vaccine. Ask the pharmacist to tell you what your copay will be. If you need to you can call your insurance company to ask more questions. After you get the vaccine, have the pharmacist fax in documentation to the office. Shingles vaccine Please get a shingles vaccine at your pharmacy. This is also called SHINGRIX. Ask the pharmacist to tell you what your copay will be. If you need to you can call your insurance company to ask more questions. After you get the vaccine, have the pharmacist fax in documentation to the office. Introducing [facility] & HEALTH SERVICES! Deb Alex introduces [employer] patient portal. Now you can access parts of your medical record, email your doctor's office, and request medication refills online. 1. In your internet browser, go to [email or web address] 2. Click on the First Time User? Click Here link in the Sign In box. You will see the New Member Sign Up page. 3. Enter your [employer] Access Code exactly as it appears below. You will not need to use this code after youve completed the sign-up process. If you do not sign up before the expiration date, you must request a new code. · [employer] Access Code: [email or web address] Expires: 7/18/2018  2:56 PM 
 
 4. Enter the last four digits of your Social Security Number (xxxx) and Date of Birth (mm/dd/yyyy) as indicated and click Submit. You will be taken to the next sign-up page. 5. Create a [employer] ID. This will be your [employer] login ID and cannot be changed, so think of one that is secure and easy to remember. 6. Create a [employer] password. You can change your password at any time. 7. Enter your Password Reset Question and Answer. This can be used at a later time if you forget your password. 8. Enter your e-mail address. You will receive e-mail notification when new information is available in [address] 9. Click Sign Up. You can now view and download portions of your medical record. 10. Click the Download Summary menu link to download a portable copy of your medical information. If you have questions, please visit the Frequently Asked Questions section of the [employer] website. Remember, [employer] is NOT to be used for urgent needs. For medical emergencies, dial 911. Now available from your iPhone and Android! Please provide this summary of care documentation to your next provider. Your primary care clinician is listed as Keily Saavedra. If you have any questions after today's visit, please call [phone].

## 2018-04-19 NOTE — PROGRESS NOTES
[address] [place] [address] Visit   04/19/2018  Patient ID: Kristian Castro is a 76 y.o. female. Assessment/Plan:    Diagnoses and all orders for this visit:    1. White coat syndrome without diagnosis of hypertension    2. Encounter for immunization  -     varicella-zoster recombinant, PF, (SHINGRIX, PF,) 50 mcg/0.5 mL susr injection; 0.5 mL by IntraMUSCular route once for 1 dose. Administer second dose 2-6 months after first dose. See patient instructions for more. Counselled pt on Patient health concerns and plans. Patient was offered a choice/choices in the treatment plan today. Reviewed return precautions as appropriate. Patient expresses understanding of the plan and agrees with recommendations. Patient Instructions   TODAY, please go to:      CHECK OUT - If you received a referral, Show the     Please schedule the following appointments at CHECK OUT:  · Lab visit, fasting, the week before our visit. · Medicare wellness visit and lab visit with Dr. Olivia Reyes in late July 2018 (40 min)      Continue to check BP at home     Tdap vaccine  Please get a tetanus plus pertussis (whooping cough) vaccine at your pharmacy. This is also known as the Tdap vaccine. Ask the pharmacist to tell you what your copay will be. If you need to you can call your insurance company to ask more questions. After you get the vaccine, have the pharmacist fax in documentation to the office. Shingles vaccine  Please get a shingles vaccine at your pharmacy. This is also called SHINGRIX. Ask the pharmacist to tell you what your copay will be. If you need to you can call your insurance company to ask more questions. After you get the vaccine, have the pharmacist fax in documentation to the office.          Subjective:   HPI:  Kristian Castro is a 76 y.o. female being seen for:   Chief Complaint   Patient presents with    Hypertension     follow up     Home BPs good    Calcium pills made her constipated    Needs information sent to nephrology for referral    Screening and Prevention Due:  Health Maintenance Due   Topic Date Due    DTaP/Tdap/Td series (1 - Tdap) 01/02/2007         Review of Systems  Otherwise as noted in HPI    Social History     Social History Narrative    Lives with     Has one adopted son. fmr . Multiple brothers and sisters         History   Smoking Status    Never Smoker   Smokeless Tobacco    Never Used     ? Objective:     Visit Vitals    /84 (BP 1 Location: Left arm, BP Patient Position: Sitting)    Pulse (!) 101    Temp 98 °F (36.7 °C) (Oral)    Resp 18    Ht 5' 2\" (1.575 m)    Wt 191 lb 8 oz (86.9 kg)    SpO2 97%    BMI 35.03 kg/m2       Wt Readings from Last 3 Encounters:   04/19/18 191 lb 8 oz (86.9 kg)   09/19/17 185 lb (83.9 kg)   07/24/17 189 lb (85.7 kg)       Physical Exam   Constitutional: She appears well-developed and well-nourished. No distress. Cardiovascular: Normal rate, regular rhythm and normal heart sounds. Exam reveals no gallop and no friction rub. No murmur heard. Pulmonary/Chest: Effort normal. No accessory muscle usage. No respiratory distress. She has no decreased breath sounds. She has no wheezes. She has no rhonchi. She has no rales. Neurological: She is alert. Psychiatric: She has a normal mood and affect. Her behavior is normal.            No Known Allergies  Prior to Admission medications    Medication Sig Start Date End Date Taking? Authorizing Provider   food supplemt, lactose-reduced (NUTRITIONAL SHAKE) liqd Take 237 mL by mouth every Monday, Wednesday, Friday. Yes Historical Provider   RED YEAST RICE PO Take  by mouth. Yes Historical Provider   LACTOBACILLUS ACIDOPHILUS (PROBIOTIC PO) Take  by mouth. Yes Historical Provider   CALCIUM CARBONATE (CALCIUM 300 PO) Take  by mouth. Historical Provider   multivitamin (ONE A DAY) tablet Take 1 Tab by mouth daily.  Has extra 1000 units of Vitamin D. Historical Provider   cholecalciferol, vitamin d3, (VITAMIN D) 1,000 unit tablet Take 1,000 Units by mouth daily.     Historical Provider     Patient Active Problem List   Diagnosis Code    Dyslipidemia E78.5    Obesity, Class I, BMI 30-34.9 E66.9    White coat syndrome without diagnosis of hypertension R03.0

## 2018-11-07 ENCOUNTER — OFFICE VISIT (OUTPATIENT)
Dept: FAMILY MEDICINE CLINIC | Age: 68
End: 2018-11-07

## 2018-11-07 VITALS
DIASTOLIC BLOOD PRESSURE: 83 MMHG | SYSTOLIC BLOOD PRESSURE: 139 MMHG | OXYGEN SATURATION: 98 % | HEART RATE: 76 BPM | RESPIRATION RATE: 18 BRPM | WEIGHT: 188 LBS | BODY MASS INDEX: 34.6 KG/M2 | HEIGHT: 62 IN | TEMPERATURE: 98.4 F

## 2018-11-07 DIAGNOSIS — M85.80 OSTEOPENIA, UNSPECIFIED LOCATION: ICD-10-CM

## 2018-11-07 DIAGNOSIS — E78.5 DYSLIPIDEMIA: ICD-10-CM

## 2018-11-07 DIAGNOSIS — N18.30 CKD (CHRONIC KIDNEY DISEASE) STAGE 3, GFR 30-59 ML/MIN (HCC): ICD-10-CM

## 2018-11-07 DIAGNOSIS — M89.9 DISORDER OF BONE: ICD-10-CM

## 2018-11-07 DIAGNOSIS — E66.9 OBESITY, CLASS I, BMI 30-34.9: ICD-10-CM

## 2018-11-07 DIAGNOSIS — R03.0 WHITE COAT SYNDROME WITHOUT DIAGNOSIS OF HYPERTENSION: Primary | ICD-10-CM

## 2018-11-07 NOTE — PATIENT INSTRUCTIONS
Osteopenia Management    I recommend you take at least 1200 mg of elemental calcium daily (one 600 mg tablet in the morning and one in the afternoon/evening) and/or consume dietary calcium in addition to oral to at least 1200 mg.    I recommend at least 800 IU of vitamin D daily. I will check your vitamin D level today, and if it is low, I will prescribe you a weekly supplement called ergocalciferol 50,000. I recommend that you perform physical exercise daily for at least 30 minutes with low intensity resistance training with lightweights or rubber tension ropes (Thera-Band). You should get a call about the bone density test (DXA). You can do this at your convenience. Try to schedule at place where you had the last one (the  can look it up for you). Learning About the [address] Diet  What is the Mediterranean diet? The Mediterranean diet is a style of eating rather than a diet plan. It features foods eaten in [place], [place], [place] and [place], and other countries along the [place]. It emphasizes eating foods like fish, fruits, vegetables, beans, high-fiber breads and whole grains, nuts, and olive oil. This style of eating includes limited red meat, cheese, and sweets. Why choose the Mediterranean diet? A Mediterranean-style diet may improve heart health. It contains more fat than other heart-healthy diets. But the fats are mainly from nuts, unsaturated oils (such as fish oils and olive oil), and certain nut or seed oils (such as canola, soybean, or flaxseed oil). These fats may help protect the heart and blood vessels. How can you get started on the Mediterranean diet? Here are some things you can do to switch to a more Mediterranean way of eating. What to eat  · Eat a variety of fruits and vegetables each day, such as grapes, blueberries, tomatoes, broccoli, peppers, figs, olives, spinach, eggplant, beans, lentils, and chickpeas.   · Eat a variety of whole-grain foods each day, such as oats, brown rice, and whole wheat bread, pasta, and couscous. · Eat fish at least 2 times a week. Try tuna, salmon, mackerel, lake trout, herring, or sardines. · Eat moderate amounts of low-fat dairy products, such as milk, cheese, or yogurt. · Eat moderate amounts of poultry and eggs. · Choose healthy (unsaturated) fats, such as nuts, olive oil, and certain nut or seed oils like canola, soybean, and flaxseed. · Limit unhealthy (saturated) fats, such as butter, palm oil, and coconut oil. And limit fats found in animal products, such as meat and dairy products made with whole milk. Try to eat red meat only a few times a month in very small amounts. · Limit sweets and desserts to only a few times a week. This includes sugar-sweetened drinks like soda. The Mediterranean diet may also include red wine with your meal--1 glass each day for women and up to 2 glasses a day for men. Tips for eating at home  · Use herbs, spices, garlic, lemon zest, and citrus juice instead of salt to add flavor to foods. · Add avocado slices to your sandwich instead of dodge. · Have fish for lunch or dinner instead of red meat. Brush the fish with olive oil, and broil or grill it. · Sprinkle your salad with seeds or nuts instead of cheese. · Cook with olive or canola oil instead of butter or oils that are high in saturated fat. · Switch from 2% milk or whole milk to 1% or fat-free milk. · Dip raw vegetables in a vinaigrette dressing or hummus instead of dips made from mayonnaise or sour cream.  · Have a piece of fruit for dessert instead of a piece of cake. Try baked apples, or have some dried fruit. Tips for eating out  · Try broiled, grilled, baked, or poached fish instead of having it fried or breaded. · Ask your  to have your meals prepared with olive oil instead of butter. · Order dishes made with marinara sauce or sauces made from olive oil.  Avoid sauces made from cream or mayonnaise. · Choose whole-grain breads, whole wheat pasta and pizza crust, brown rice, beans, and lentils. · Cut back on butter or margarine on bread. Instead, you can dip your bread in a small amount of olive oil. · Ask for a side salad or grilled vegetables instead of french fries or chips. Where can you learn more? Go to http://chay-caroline.info/. Enter [phone] in the search box to learn more about \"Learning About the Mediterranean Diet. \"  Current as of: March 29, 2018  Content Version: 11.8  © 9471-6471 Healthwise, [employer]. Care instructions adapted under license by [employer] (which disclaims liability or warranty for this information). If you have questions about a medical condition or this instruction, always ask your healthcare professional. [address] any warranty or liability for your use of this information.

## 2018-11-07 NOTE — PROGRESS NOTES
[facility]  Identified pt with two pt identifiers(name and ). Chief Complaint   Patient presents with   [facility] 9/est care       1. Have you been to the ER, urgent care clinic since your last visit? Hospitalized since your last visit? no    2. Have you seen or consulted any other health care providers outside of the [address] [place] [address] since your last visit? Include any pap smears or colon screening. no      Advance Care Planning    In the event something were to happen to you and you were unable to speak on your behalf, do you have an Advance Directive/ Living Will in place stating your wishes? NO    If yes, do we have a copy on file NO    If no, would you like information NO    Medication reconciliation up to date and corrected with patient at this time. Today's provider has been notified of reason for visit, vitals and flowsheets obtained on patients. Reviewed record in preparation for visit, huddled with provider and have obtained necessary documentation.       Health Maintenance Due   Topic    Shingrix Vaccine Age 50> (1 of 2)    DTaP/Tdap/Td series (1 - Tdap)    MEDICARE YEARLY EXAM     Influenza Age 5 to Adult        Wt Readings from Last 3 Encounters:   18 188 lb (85.3 kg)   18 191 lb 8 oz (86.9 kg)   17 185 lb (83.9 kg)     Temp Readings from Last 3 Encounters:   18 98.4 °F (36.9 °C) (Oral)   18 98 °F (36.7 °C) (Oral)   17 97.6 °F (36.4 °C) (Oral)     BP Readings from Last 3 Encounters:   18 138/84   17 (!) 135/94   17 (!) 173/93     Pulse Readings from Last 3 Encounters:   18 76   18 (!) 101   17 73     Vitals:    18 1050   Pulse: 76   Resp: 18   Temp: 98.4 °F (36.9 °C)   TempSrc: Oral   SpO2: 98%   Weight: 188 lb (85.3 kg)   Height: 5' 2\" (1.575 m)   PainSc:   0 - No pain   LMP: 1974         Learning Assessment:  :     Learning Assessment 2018   PRIMARY LEARNER Patient Patient   HIGHEST LEVEL OF EDUCATION - PRIMARY LEARNER  SOME COLLEGE -   BARRIERS PRIMARY LEARNER - NONE   CO-LEARNER CAREGIVER - No   PRIMARY LANGUAGE ENGLISH ENGLISH   LEARNER PREFERENCE PRIMARY DEMONSTRATION LISTENING     - DEMONSTRATION   ANSWERED BY Patient Rakelaleksandr Chau   RELATIONSHIP SELF SELF       Depression Screening:  :     PHQ over the last two weeks 11/7/2018   Little interest or pleasure in doing things Not at all   Feeling down, depressed, irritable, or hopeless Not at all   Total Score PHQ 2 0       Fall Risk Assessment:  :     Fall Risk Assessment, last 12 mths 11/7/2018   Able to walk? Yes   Fall in past 12 months? No       Abuse Screening:  :     Abuse Screening Questionnaire 11/7/2018 7/24/2017   Do you ever feel afraid of your partner? N N   Are you in a relationship with someone who physically or mentally threatens you? N N   Is it safe for you to go home?  Y Y       ADL Screening:  :     ADL Assessment 11/7/2018   Feeding yourself No Help Needed   Getting from bed to chair No Help Needed   Getting dressed No Help Needed   Bathing or showering No Help Needed   Walk across the room (includes cane/walker) No Help Needed   Using the telphone No Help Needed   Taking your medications No Help Needed   Preparing meals No Help Needed   Managing money (expenses/bills) No Help Needed   Moderately strenuous housework (laundry) No Help Needed   Shopping for personal items (toiletries/medicines) No Help Needed   Shopping for groceries No Help Needed   Driving No Help Needed   Climbing a flight of stairs No Help Needed   Getting to places beyond walking distances No Help Needed           BMI:  Weight Metrics 11/7/2018 4/19/2018 9/19/2017 7/24/2017 6/1/2016 9/9/2015 6/10/2015   Weight 188 lb 191 lb 8 oz 185 lb 189 lb 193 lb 3.2 oz 190 lb 192 lb 6.4 oz   BMI 34.39 kg/m2 35.03 kg/m2 33.84 kg/m2 34.57 kg/m2 34.75 kg/m2 34.18 kg/m2 34.61 kg/m2           Medication reconciliation up to date and corrected with patient at this time.

## 2018-11-07 NOTE — PROGRESS NOTES
HPI:  76 y.o.  presents for follow up appointment. She was previously seen by Dr. Grisel Harris, last visit 4/19/2018, who is no longer with the practice so patient wishes to establish with me. No hospital, ER or specialist visits since last primary care visit except as noted below. Hypertension - white-coat, is not prescribed medication, checks regularly at home monitoring ranges: 107-133/ 67-85. No history of heart disease or stroke. No chest pain, no shortness of breath, no headaches, no lower extremity swelling. She has brought in her home monitor in a recent prior visit and was comparable to ours, just slightly higher. Osteopenia: She stopped her calcium supplement due to constipation. She is drinking a fortified shake once daily with calcium 30%, and drinks 2 servings of milk daily, perhaps some yogurt. She stopped eating cheese since it made her bunion hurt. Vitamin D 1000 units daily. DXA 10/09/2015 shows lumbar spine T score -1.1, with 10 year risk major osteoporotic fracture 3.1% and 10 year risk hip fracture 0.2%. She eats fresh veges and fruits, cooks from scratch and avoids boxed food. She exercises by walking about 1 hour twice a week. CKD Stage 3  She is aware of decreased kidney function. She has never seen nephrology (was referred by Dr. Grisel Harris last year but did not go). She does not take NSAIDs. No history of kidney stone or infection. She drinks plenty of water daily.       Patient Active Problem List    Diagnosis    Dyslipidemia    Obesity, Class I, BMI 30-34.9    White coat syndrome without diagnosis of hypertension     exacerbated by stress           Past Medical History:   Diagnosis Date    Dyslipidemia 6/19/2012    History of acute renal failure 06/19/2012    History of chickenpox     History of measles childhood    History of mumps childhood    Obesity, Class I, BMI 30-34.9 6/19/2012    White coat syndrome without diagnosis of hypertension 2005    exacerbated by stress       Social History     Tobacco Use    Smoking status: Never Smoker    Smokeless tobacco: Never Used   Substance Use Topics    Alcohol use: Yes     Comment: occassionally, 1 drink, 1-2 times a year    Drug use: No       Outpatient Medications Marked as Taking for the 11/7/18 encounter (Office Visit) with Xuan Aly PA-C   Medication Sig Dispense Refill    food supplemt, lactose-reduced (NUTRITIONAL SHAKE) liqd Take 237 mL by mouth every Monday, Wednesday, Friday.  CALCIUM CARBONATE (CALCIUM 300 PO) Take  by mouth.  RED YEAST RICE PO Take  by mouth.  LACTOBACILLUS ACIDOPHILUS (PROBIOTIC PO) Take  by mouth.  multivitamin (ONE A DAY) tablet Take 1 Tab by mouth daily. Has extra 1000 units of Vitamin D.      cholecalciferol, vitamin d3, (VITAMIN D) 1,000 unit tablet Take 1,000 Units by mouth daily. No Known Allergies    ROS:  ROS negative except as per HPI. PE:  Visit Vitals  /83 (BP 1 Location: Left arm, BP Patient Position: Sitting)   Pulse 76   Temp 98.4 °F (36.9 °C) (Oral)   Resp 18   Ht 5' 2\" (1.575 m)   Wt 188 lb (85.3 kg)   LMP 01/01/1974   SpO2 98%   BMI 34.39 kg/m²     Gen: alert, oriented, no acute distress  Head: normocephalic, atraumatic  Eyes: pupils equal round reactive to light, sclera clear, conjunctiva clear  Oral: moist mucus membranes, no oral lesions, no pharyngeal inflammation or exudate  Neck: symmetric normal sized thyroid, no carotid bruits, no jugular vein distention  Resp: no increase work of breathing, lungs clear to ausculation bilaterally, no wheezing, rales or rhonchi  CV: S1, S2 normal.  No murmurs, rubs, or gallops. Abd: soft, not tender, not distended. No hepatosplenomegaly. Normal bowel sounds. Neuro: normal strength and movement in all extremities, sensation intact and symmetric. Skin: no lesion or rash  Extremities: no cyanosis or edema    No results found for this visit on 11/07/18.     Assessment/Plan:      ICD-10-CM ICD-9-CM    1. White coat syndrome without diagnosis of hypertension - she is new to me and not on medications, she does not like to take medicines, checks regularly at home monitoring ranges: 107-133/ 67-85 P78.2 910.6 METABOLIC PANEL, COMPREHENSIVE      CBC WITH AUTOMATED DIFF      CBC WITH AUTOMATED DIFF      METABOLIC PANEL, COMPREHENSIVE   2. Dyslipidemia - labs 7/24/2017 show total 214, HDL 56, . She is not on medications, follows low cholesterol diet E78.5 272.4 LIPID PANEL      LIPID PANEL   3. Obesity, Class I, BMI 30-34.9 - handout given on healthy diet for weight management   E66.9 278.00    4. CKD (chronic kidney disease) stage 3, GFR 30-59 ml/min (HCC) - eGFR 50 (previously 61). She has been referred to nephrology last year by dr. Evelyn Stanford but did not go, she does not take NSAIDs, she is not taking medicine for whitecoat hypertension, recheck labs and refer again to nephrology (consider low dose anti-hypertensive)  L23.4 680.2 METABOLIC PANEL, COMPREHENSIVE      METABOLIC PANEL, COMPREHENSIVE   5. Osteopenia, unspecified location - DXA 10/09/2015 shows lumbar spine T score -1.1, with 10 year risk major osteoporotic fracture 3.1% and 10 year risk hip fracture 0.2%. She is getting adequate calcium and vitamin D daily. M85.80 733.90    6. Disorder of bone M89.9 733.90 DEXA BONE DENSITY STUDY AXIAL      VITAMIN D, 25 HYDROXY      VITAMIN D, 25 HYDROXY       Health Maintenance reviewed - updated. Current Outpatient Medications   Medication Sig Dispense Refill    food supplemt, lactose-reduced (NUTRITIONAL SHAKE) liqd Take 237 mL by mouth every Monday, Wednesday, Friday.  CALCIUM CARBONATE (CALCIUM 300 PO) Take  by mouth.  RED YEAST RICE PO Take  by mouth.  LACTOBACILLUS ACIDOPHILUS (PROBIOTIC PO) Take  by mouth.  multivitamin (ONE A DAY) tablet Take 1 Tab by mouth daily.  Has extra 1000 units of Vitamin D.      cholecalciferol, vitamin d3, (VITAMIN D) 1,000 unit tablet Take 1,000 Units by mouth daily. Recommended healthy diet low in carbohydrates, fats, sodium and cholesterol. Recommended regular cardiovascular exercise 3-6 times per week for 30-60 minutes daily. Verbal and written instructions (see AVS) provided. Patient expresses understanding of diagnosis and treatment plan. Follow-up Disposition:  Return in about 6 months (around 5/7/2019).

## 2018-11-08 LAB
25(OH)D3+25(OH)D2 SERPL-MCNC: 31.2 NG/ML (ref 30–100)
ALBUMIN SERPL-MCNC: 4.6 G/DL (ref 3.6–4.8)
ALBUMIN/GLOB SERPL: 1.6 {RATIO} (ref 1.2–2.2)
ALP SERPL-CCNC: 99 IU/L (ref 39–117)
ALT SERPL-CCNC: 8 IU/L (ref 0–32)
AST SERPL-CCNC: 17 IU/L (ref 0–40)
BASOPHILS # BLD AUTO: 0 X10E3/UL (ref 0–0.2)
BASOPHILS NFR BLD AUTO: 1 %
BILIRUB SERPL-MCNC: 0.4 MG/DL (ref 0–1.2)
BUN SERPL-MCNC: 13 MG/DL (ref 8–27)
BUN/CREAT SERPL: 11 (ref 12–28)
CALCIUM SERPL-MCNC: 10.2 MG/DL (ref 8.7–10.3)
CHLORIDE SERPL-SCNC: 105 MMOL/L (ref 96–106)
CHOLEST SERPL-MCNC: 231 MG/DL (ref 100–199)
CO2 SERPL-SCNC: 28 MMOL/L (ref 20–29)
CREAT SERPL-MCNC: 1.15 MG/DL (ref 0.57–1)
EOSINOPHIL # BLD AUTO: 0.1 X10E3/UL (ref 0–0.4)
EOSINOPHIL NFR BLD AUTO: 1 %
ERYTHROCYTE [DISTWIDTH] IN BLOOD BY AUTOMATED COUNT: 16.1 % (ref 12.3–15.4)
GLOBULIN SER CALC-MCNC: 2.9 G/DL (ref 1.5–4.5)
GLUCOSE SERPL-MCNC: 89 MG/DL (ref 65–99)
HCT VFR BLD AUTO: 44.6 % (ref 34–46.6)
HDLC SERPL-MCNC: 58 MG/DL
HGB BLD-MCNC: 14.7 G/DL (ref 11.1–15.9)
IMM GRANULOCYTES # BLD: 0 X10E3/UL (ref 0–0.1)
IMM GRANULOCYTES NFR BLD: 0 %
INTERPRETATION, 910389: NORMAL
INTERPRETATION: NORMAL
LDLC SERPL CALC-MCNC: 146 MG/DL (ref 0–99)
LYMPHOCYTES # BLD AUTO: 3.3 X10E3/UL (ref 0.7–3.1)
LYMPHOCYTES NFR BLD AUTO: 46 %
MCH RBC QN AUTO: 28 PG (ref 26.6–33)
MCHC RBC AUTO-ENTMCNC: 33 G/DL (ref 31.5–35.7)
MCV RBC AUTO: 85 FL (ref 79–97)
MONOCYTES # BLD AUTO: 0.6 X10E3/UL (ref 0.1–0.9)
MONOCYTES NFR BLD AUTO: 8 %
NEUTROPHILS # BLD AUTO: 3.2 X10E3/UL (ref 1.4–7)
NEUTROPHILS NFR BLD AUTO: 44 %
PDF IMAGE, 910387: NORMAL
PLATELET # BLD AUTO: 204 X10E3/UL (ref 150–379)
POTASSIUM SERPL-SCNC: 5.2 MMOL/L (ref 3.5–5.2)
PROT SERPL-MCNC: 7.5 G/DL (ref 6–8.5)
RBC # BLD AUTO: 5.25 X10E6/UL (ref 3.77–5.28)
SODIUM SERPL-SCNC: 143 MMOL/L (ref 134–144)
TRIGL SERPL-MCNC: 135 MG/DL (ref 0–149)
VLDLC SERPL CALC-MCNC: 27 MG/DL (ref 5–40)
WBC # BLD AUTO: 7.2 X10E3/UL (ref 3.4–10.8)

## 2018-11-19 DIAGNOSIS — N18.30 STAGE 3 CHRONIC KIDNEY DISEASE (HCC): Primary | ICD-10-CM

## 2018-11-20 ENCOUNTER — TELEPHONE (OUTPATIENT)
Dept: FAMILY MEDICINE CLINIC | Age: 68
End: 2018-11-20

## 2018-11-20 NOTE — PROGRESS NOTES
Please inform her stable chronic kidney disease stage 3. I still recommend seeing nephrology and have put in referral (please provide info). Cholesterol remains elevated, slightly higher than last check, but I know she has declined medicines. Follow diet closely, such as Mediterranean diet.  Remainder of labs are normal.

## 2018-11-20 NOTE — TELEPHONE ENCOUNTER
Spoke with patient after obtaining 2 patient identifiers  Patient given lab results and number off referral. Patient told to  Call office if she was unable to make appointment. Verbalized understanding.

## 2018-11-20 NOTE — PROGRESS NOTES
Writer called patient, no answer message was left for patient to call office regarding labs. 2nd attempt at reaching patient, results letter with referral has been mailed.

## 2018-11-27 ENCOUNTER — HOSPITAL ENCOUNTER (OUTPATIENT)
Dept: MAMMOGRAPHY | Age: 68
Discharge: HOME | End: 2018-11-27
Payer: MEDICARE

## 2018-11-27 DIAGNOSIS — M89.9 DISORDER OF BONE: ICD-10-CM

## 2018-11-27 PROCEDURE — 77080 DXA BONE DENSITY AXIAL: CPT

## 2018-11-28 ENCOUNTER — TELEPHONE (OUTPATIENT)
Dept: FAMILY MEDICINE CLINIC | Age: 68
End: 2018-11-28

## 2018-11-28 NOTE — TELEPHONE ENCOUNTER
----- Message from Praful Fine sent at 11/28/2018  9:57 AM EST -----  Regarding: Debra/Telephone  Pt is requesting a call back regarding information for the \"Kidney Specialist\" she was referred to.     Best [phone]

## 2018-11-28 NOTE — TELEPHONE ENCOUNTER
[id number] labs printed along with last note from Arlin Homans, PA-C and faxed to Dr Jazz Llamas office at [phone]

## 2019-01-28 ENCOUNTER — HOSPITAL ENCOUNTER (OUTPATIENT)
Dept: MAMMOGRAPHY | Age: 69
Discharge: HOME | End: 2019-01-28
Payer: MEDICARE

## 2019-01-28 DIAGNOSIS — Z12.39 SCREENING BREAST EXAMINATION: ICD-10-CM

## 2019-01-28 PROCEDURE — 77067 SCR MAMMO BI INCL CAD: CPT

## 2019-05-08 ENCOUNTER — OFFICE VISIT (OUTPATIENT)
Dept: FAMILY MEDICINE CLINIC | Age: 69
End: 2019-05-08

## 2019-05-15 ENCOUNTER — OFFICE VISIT (OUTPATIENT)
Dept: FAMILY MEDICINE CLINIC | Age: 69
End: 2019-05-15

## 2019-05-15 VITALS
TEMPERATURE: 96.8 F | OXYGEN SATURATION: 93 % | SYSTOLIC BLOOD PRESSURE: 136 MMHG | HEIGHT: 62 IN | WEIGHT: 189.5 LBS | HEART RATE: 78 BPM | RESPIRATION RATE: 18 BRPM | DIASTOLIC BLOOD PRESSURE: 94 MMHG | BODY MASS INDEX: 34.87 KG/M2

## 2019-05-15 DIAGNOSIS — M85.80 OSTEOPENIA, UNSPECIFIED LOCATION: ICD-10-CM

## 2019-05-15 DIAGNOSIS — N18.30 CKD (CHRONIC KIDNEY DISEASE) STAGE 3, GFR 30-59 ML/MIN (HCC): ICD-10-CM

## 2019-05-15 DIAGNOSIS — E66.9 OBESITY, CLASS I, BMI 30-34.9: ICD-10-CM

## 2019-05-15 DIAGNOSIS — Z00.00 MEDICARE ANNUAL WELLNESS VISIT, SUBSEQUENT: Primary | ICD-10-CM

## 2019-05-15 DIAGNOSIS — E78.5 DYSLIPIDEMIA: ICD-10-CM

## 2019-05-15 DIAGNOSIS — Z23 ENCOUNTER FOR IMMUNIZATION: ICD-10-CM

## 2019-05-15 DIAGNOSIS — Z13.39 SCREENING FOR ALCOHOLISM: ICD-10-CM

## 2019-05-15 DIAGNOSIS — Z13.31 SCREENING FOR DEPRESSION: ICD-10-CM

## 2019-05-15 DIAGNOSIS — R03.0 WHITE COAT SYNDROME WITHOUT DIAGNOSIS OF HYPERTENSION: ICD-10-CM

## 2019-05-15 NOTE — PROGRESS NOTES
HPI: 
71 y.o.  presents for follow up appointment, last visit 11/07/2018. See other note for Annual Medicare Wellness visit. Hypertension - white-coat, is not prescribed medication, checks regularly at home monitoring ranges: 100s-120s/70s-80s. No history of heart disease or stroke. No chest pain, no shortness of breath, no headaches, no lower extremity swelling. She is waling 3 days per week. She is eating healthier and following low salt diet. Osteopenia: She stopped her calcium supplement due to constipation. She is drinking a fortified shake once daily with calcium 30%, and drinks 1 servings of milk daily (down from 2 servings and she stopped eating yogurt since last visit). Vitamin D 1000 units daily. DXA 10/09/2015 shows lumbar spine T score -1.1, with 10 year risk major osteoporotic fracture 3.1% and 10 year risk hip fracture 0.2%. DXA 11/27/2018 shows left femoral neck T score -0.8, with 10 year risk major osteoporotic fracture 3.4% and 10 year risk hip fracture 0.3%. CKD Stage 3 She had one appointment with Dr. Rigoberto Rdz. She states he recommended starting statin for cholesterol, which she declined. She did not follow through on the [address] and follow up lab work. She does not take NSAIDs. No history of kidney stone or infection. She drinks plenty of water daily. She had a tooth pulled since last visit. She takes probiotic twice murillo to stay regular. She declines Shingrix, but would like Tdap. ACP discussed 7/24/2017 Patient Active Problem List  
 Diagnosis  Dyslipidemia  Obesity, Class I, BMI 30-34.9  White coat syndrome without diagnosis of hypertension  
  exacerbated by stress Past Medical History:  
Diagnosis Date  Dyslipidemia 6/19/2012  History of acute renal failure 06/19/2012  History of chickenpox  History of measles childhood  History of mumps childhood  Obesity, Class I, BMI 30-34.9 6/19/2012  White coat syndrome without diagnosis of hypertension 2005  
 exacerbated by stress Social History Tobacco Use  Smoking status: Never Smoker  Smokeless tobacco: Never Used Substance Use Topics  Alcohol use: Yes Comment: occassionally, 1 drink, 1-2 times a year  Drug use: No  
 
 
Outpatient Medications Marked as Taking for the 5/15/19 encounter (Office Visit) with Earl Hector PA-C Medication Sig Dispense Refill  B.breve-L.acid-L.rham-S.thermo (PROBIOTIC) 3 billion cell chew Take  by mouth.  food supplemt, lactose-reduced (NUTRITIONAL SHAKE) liqd Take 237 mL by mouth every Monday, Wednesday, Friday.  CALCIUM CARBONATE (CALCIUM 300 PO) Take  by mouth.  RED YEAST RICE PO Take  by mouth.  cholecalciferol, vitamin d3, (VITAMIN D) 1,000 unit tablet Take 1,000 Units by mouth daily. No Known Allergies ROS: 
ROS negative except as per HPI. PE: 
Visit Vitals BP (!) 136/94 (BP 1 Location: Right arm, BP Patient Position: Sitting) Pulse 78 Temp 96.8 °F (36 °C) (Oral) Resp 18 Ht 5' 2\" (1.575 m) Wt 189 lb 8 oz (86 kg) LMP 01/01/1974 SpO2 93% BMI 34.66 kg/m² Gen: alert, oriented, no acute distress Head: normocephalic, atraumatic Eyes: pupils equal round reactive to light, sclera clear, conjunctiva clear Oral: moist mucus membranes, no oral lesions, no pharyngeal inflammation or exudate Neck: symmetric normal sized thyroid, no carotid bruits, no jugular vein distention Resp: no increase work of breathing, lungs clear to ausculation bilaterally, no wheezing, rales or rhonchi CV: S1, S2 normal.  No murmurs, rubs, or gallops. Abd: soft, not tender, not distended. No hepatosplenomegaly. Normal bowel sounds. Neuro: normal strength and movement in all extremities, sensation intact and symmetric. Skin: no lesion or rash Extremities: no cyanosis or edema No results found for this visit on 05/15/19. Results for orders placed or performed in visit on 11/07/18 CBC WITH AUTOMATED DIFF Result Value Ref Range WBC 7.2 3.4 - 10.8 x10E3/uL  
 RBC 5.25 3.77 - 5.28 x10E6/uL HGB 14.7 11.1 - 15.9 g/dL HCT 44.6 34.0 - 46.6 % MCV 85 79 - 97 fL  
 MCH 28.0 26.6 - 33.0 pg  
 MCHC 33.0 31.5 - 35.7 g/dL  
 RDW 16.1 (H) 12.3 - 15.4 % PLATELET 495 176 - 098 x10E3/uL NEUTROPHILS 44 Not Estab. % Lymphocytes 46 Not Estab. % MONOCYTES 8 Not Estab. % EOSINOPHILS 1 Not Estab. % BASOPHILS 1 Not Estab. %  
 ABS. NEUTROPHILS 3.2 1.4 - 7.0 x10E3/uL Abs Lymphocytes 3.3 (H) 0.7 - 3.1 x10E3/uL  
 ABS. MONOCYTES 0.6 0.1 - 0.9 x10E3/uL  
 ABS. EOSINOPHILS 0.1 0.0 - 0.4 x10E3/uL  
 ABS. BASOPHILS 0.0 0.0 - 0.2 x10E3/uL IMMATURE GRANULOCYTES 0 Not Estab. %  
 ABS. IMM. GRANS. 0.0 0.0 - 0.1 x10E3/uL METABOLIC PANEL, COMPREHENSIVE Result Value Ref Range Glucose 89 65 - 99 mg/dL BUN 13 8 - 27 mg/dL Creatinine 1.15 (H) 0.57 - 1.00 mg/dL GFR est non-AA 49 (L) >59 mL/min/1.73 GFR est AA 56 (L) >59 mL/min/1.73  
 BUN/Creatinine ratio 11 (L) 12 - 28 Sodium 143 134 - 144 mmol/L Potassium 5.2 3.5 - 5.2 mmol/L Chloride 105 96 - 106 mmol/L  
 CO2 28 20 - 29 mmol/L Calcium 10.2 8.7 - 10.3 mg/dL Protein, total 7.5 6.0 - 8.5 g/dL Albumin 4.6 3.6 - 4.8 g/dL GLOBULIN, TOTAL 2.9 1.5 - 4.5 g/dL A-G Ratio 1.6 1.2 - 2.2 Bilirubin, total 0.4 0.0 - 1.2 mg/dL Alk. phosphatase 99 39 - 117 IU/L  
 AST (SGOT) 17 0 - 40 IU/L  
 ALT (SGPT) 8 0 - 32 IU/L  
LIPID PANEL Result Value Ref Range Cholesterol, total 231 (H) 100 - 199 mg/dL Triglyceride 135 0 - 149 mg/dL HDL Cholesterol 58 >39 mg/dL VLDL, calculated 27 5 - 40 mg/dL LDL, calculated 146 (H) 0 - 99 mg/dL CKD REPORT Result Value Ref Range Interpretation Note VITAMIN D, 25 HYDROXY Result Value Ref Range VITAMIN D, 25-HYDROXY 31.2 30.0 - 100.0 ng/mL CVD REPORT Result Value Ref Range INTERPRETATION Note PDF IMAGE Not applicable Assessment/Plan: ICD-10-CM ICD-9-CM 1. White coat syndrome without diagnosis of hypertension - not on medications, she does not like to take medicines and declines, checks regularly at home monitoring ranges: 100s-120s/70s-80s R03.0 796.2 2. Dyslipidemia - labs 11/07/2018 show total 231(previously 214), HDL 58 (previously 56),  (previously 135). She is not on medications, follows low cholesterol diet. Is exercising 3 days weekly. Will recheck in 11/2019, she is amenable to statin if cholesterol is not lower then E78.5 272.4 3. Obesity, Class I, BMI 30-34.9 - handout given on healthy diet for weight management E66.9 278.00 4. CKD (chronic kidney disease) stage 3, GFR 30-59 ml/min ([facility]) - eGFR 56 (previously 50, 61). She  had one appointment with Dr. China Fregoso 1/16/2019. She states he recommended starting statin for cholesterol, which she declined. She did not follow through on the [place] and follow up lab work., she does not take NSAIDs, she is not taking medicine for whitecoat hypertension,   Y89.9 586.0 METABOLIC PANEL, COMPREHENSIVE  
   METABOLIC PANEL, COMPREHENSIVE 5. Osteopenia, unspecified location - DXA 10/09/2015 shows lumbar spine T score -1.1, with 10 year risk major osteoporotic fracture 3.1% and 10 year risk hip fracture 0.2%. DXA 11/27/2018 shows left femoral neck T score -0.8, with 10 year risk major osteoporotic fracture 3.4% and 10 year risk hip fracture 0.3%. Overall stable DXA. She is getting vitamin D3 1000 units daily. Her calcium intake is slightly lower than last visit; we reviewed 1200 mg calcium daily thru diet, handout given M85.80 733.90 ICD-10-CM ICD-9-CM 6. Medicare annual wellness visit, subsequent - see other note Z00.00 V70.0 7. Encounter for immunization Z23 V03.89 diph,Pertuss,Acell,,Tet Vac-PF (ADACEL) 2 Lf-(2.5-5-3-5 mcg)-5Lf/0.5 mL susp Health Maintenance reviewed - updated. Current Outpatient Medications Medication Sig Dispense Refill  B.breve-L.acid-L.rham-S.thermo (PROBIOTIC) 3 billion cell chew Take  by mouth.  food supplemt, lactose-reduced (NUTRITIONAL SHAKE) liqd Take 237 mL by mouth every Monday, Wednesday, Friday.  CALCIUM CARBONATE (CALCIUM 300 PO) Take  by mouth.  RED YEAST RICE PO Take  by mouth.  cholecalciferol, vitamin d3, (VITAMIN D) 1,000 unit tablet Take 1,000 Units by mouth daily.  LACTOBACILLUS ACIDOPHILUS (PROBIOTIC PO) Take  by mouth.  multivitamin (ONE A DAY) tablet Take 1 Tab by mouth daily. Has extra 1000 units of Vitamin D. Recommended healthy diet low in carbohydrates, fats, sodium and cholesterol. Recommended regular cardiovascular exercise 3-6 times per week for 30-60 minutes daily. Verbal and written instructions (see AVS) provided. Patient expresses understanding of diagnosis and treatment plan. Follow-up and Dispositions · Return in about 6 months (around 11/15/2019).

## 2019-05-15 NOTE — PROGRESS NOTES
Stacey Guerrero  Identified pt with two pt identifiers(name and ). Chief Complaint Patient presents with  Hypertension  
  rm 10/non fasting 1. Have you been to the ER, urgent care clinic since your last visit? no Hospitalized since your last visit? no 
 
2. Have you seen or consulted any other health care providers outside of the [address] [place] [address] since your last visit? Include any pap smears or colon screening. no 
 
 
Advance Care Planning In the event something were to happen to you and you were unable to speak on your behalf, do you have an Advance Directive/ Living Will in place stating your wishes? NO If yes, do we have a copy on file NO If no, would you like information NO Medication reconciliation up to date and corrected with patient at this time. Today's provider has been notified of reason for visit, vitals and flowsheets obtained on patients. Reviewed record in preparation for visit, huddled with provider and have obtained necessary documentation. Health Maintenance Due Topic  Shingrix Vaccine Age 50> (1 of 2)  MEDICARE YEARLY EXAM   
 
 
Wt Readings from Last 3 Encounters:  
18 188 lb (85.3 kg) 18 191 lb 8 oz (86.9 kg) 17 185 lb (83.9 kg) Temp Readings from Last 3 Encounters:  
18 98.4 °F (36.9 °C) (Oral) 18 98 °F (36.7 °C) (Oral) 17 97.6 °F (36.4 °C) (Oral) BP Readings from Last 3 Encounters:  
18 139/83  
18 138/84  
17 (!) 135/94 Pulse Readings from Last 3 Encounters:  
18 76  
18 (!) 101  
17 73 There were no vitals filed for this visit. Learning Assessment: 
:  
 
Learning Assessment 2018 PRIMARY LEARNER Patient Patient HIGHEST LEVEL OF EDUCATION - PRIMARY LEARNER  SOME COLLEGE -  
BARRIERS PRIMARY LEARNER - NONE  
CO-LEARNER CAREGIVER - No  
PRIMARY LANGUAGE ENGLISH ENGLISH  
 LEARNER PREFERENCE PRIMARY DEMONSTRATION LISTENING  
  - DEMONSTRATION  
ANSWERED BY Patient Trung Pereira RELATIONSHIP SELF SELF Depression Screening: 
:  
 
3 most recent PHQ Screens 5/15/2019 Little interest or pleasure in doing things Not at all Feeling down, depressed, irritable, or hopeless Not at all Total Score PHQ 2 0 No flowsheet data found. Fall Risk Assessment: 
:  
 
Fall Risk Assessment, last 12 mths 5/15/2019 Able to walk? Yes Fall in past 12 months? No  
 
 
Abuse Screening: 
:  
 
Abuse Screening Questionnaire 5/15/2019 11/7/2018 7/24/2017 Do you ever feel afraid of your partner? N N N Are you in a relationship with someone who physically or mentally threatens you? N N N Is it safe for you to go home? Samantha Wade  
 
 
ADL Screening: 
:  
 
ADL Assessment 5/15/2019 Feeding yourself No Help Needed Getting from bed to chair No Help Needed Getting dressed No Help Needed Bathing or showering No Help Needed Walk across the room (includes cane/walker) No Help Needed Using the telphone No Help Needed Taking your medications No Help Needed Preparing meals No Help Needed Managing money (expenses/bills) No Help Needed Moderately strenuous housework (laundry) No Help Needed Shopping for personal items (toiletries/medicines) No Help Needed Shopping for groceries No Help Needed Driving No Help Needed Climbing a flight of stairs No Help Needed Getting to places beyond walking distances No Help Needed BMI: 
Weight Metrics 11/7/2018 4/19/2018 9/19/2017 7/24/2017 6/1/2016 9/9/2015 6/10/2015 Weight 188 lb 191 lb 8 oz 185 lb 189 lb 193 lb 3.2 oz 190 lb 192 lb 6.4 oz BMI 34.39 kg/m2 35.03 kg/m2 33.84 kg/m2 34.57 kg/m2 34.75 kg/m2 34.18 kg/m2 34.61 kg/m2 Medication reconciliation up to date and corrected with patient at this time.

## 2019-05-15 NOTE — PROGRESS NOTES
This is a Subsequent Medicare Annual Wellness Exam (AWV) (Performed 12 months after IPPE or effective date of Medicare Part B enrollment) I have reviewed the patient's medical history in detail and updated the computerized patient record. Other medical issues addressed today are in separate progress note. History Past Medical History:  
Diagnosis Date  Dyslipidemia 6/19/2012  History of acute renal failure 06/19/2012  History of chickenpox  History of measles childhood  History of mumps childhood  Obesity, Class I, BMI 30-34.9 6/19/2012  White coat syndrome without diagnosis of hypertension 2005  
 exacerbated by stress Past Surgical History:  
Procedure Laterality Date  BIOPSY BREAST Right 12/06/2011  
 benign  COLONOSCOPY  3/29/2011  HX BREAST BIOPSY Right 2012  
 us bx negative  HX TOTAL ABDOMINAL HYSTERECTOMY  1974  
 with Unilateral oopherectomy, b/l salpingectomy due to ectopic pregnancy. Current Outpatient Medications Medication Sig Dispense Refill  B.breve-L.acid-L.rham-S.thermo (PROBIOTIC) 3 billion cell chew Take  by mouth.  food supplemt, lactose-reduced (NUTRITIONAL SHAKE) liqd Take 237 mL by mouth every Monday, Wednesday, Friday.  CALCIUM CARBONATE (CALCIUM 300 PO) Take  by mouth.  RED YEAST RICE PO Take  by mouth.  cholecalciferol, vitamin d3, (VITAMIN D) 1,000 unit tablet Take 1,000 Units by mouth daily.  LACTOBACILLUS ACIDOPHILUS (PROBIOTIC PO) Take  by mouth.  multivitamin (ONE A DAY) tablet Take 1 Tab by mouth daily. Has extra 1000 units of Vitamin D. No Known Allergies Family History Problem Relation Age of Onset  Heart Disease Mother  Diabetes Mother  Stroke Father 37  
     heavy alcohol  Liver Disease Brother   
     cirrhosis  Cancer Neg Hx Social History Tobacco Use  Smoking status: Never Smoker  Smokeless tobacco: Never Used Substance Use Topics  Alcohol use: Yes Comment: occassionally, 1 drink, 1-2 times a year Patient Active Problem List  
 Diagnosis  Dyslipidemia  Obesity, Class I, BMI 30-34.9  White coat syndrome without diagnosis of hypertension  
  exacerbated by stress Depression Risk Factor Screening:  
 
3 most recent PHQ Screens 5/15/2019 Little interest or pleasure in doing things Not at all Feeling down, depressed, irritable, or hopeless Not at all Total Score PHQ 2 0 Alcohol Risk Factor Screening: You do not drink alcohol or very rarely. Functional Ability and Level of Safety:  
Hearing Loss Hearing is good. Activities of Daily Living The home contains: no safety equipment. Patient does total self care Fall Risk Fall Risk Assessment, last 12 mths 5/15/2019 Able to walk? Yes Fall in past 12 months? No  
 
 
Abuse Screen Patient is not abused Cognitive Screening Evaluation of Cognitive Function: 
Has your family/caregiver stated any concerns about your memory: no 
 
 
Patient Care Team  
Patient Care Team: 
Lavern Rashid PA-C as PCP - General (Physician Assistant) Assessment/Plan Education and counseling provided: 
Are appropriate based on today's review and evaluation End-of-Life planning (with patient's consent) Pneumococcal Vaccine Influenza Vaccine Appropriate cancer screening tests Diagnoses and all orders for this visit: 
 
1. Medicare annual wellness visit, subsequent 2. White coat syndrome without diagnosis of hypertension 3. Dyslipidemia 4. Osteopenia, unspecified location 5. CKD (chronic kidney disease) stage 3, GFR 30-59 ml/min ([facility]) 6. Encounter for immunization 
-     diph,Pertuss,Acell,,Tet Vac-PF (ADACEL) 2 Lf-(2.5-5-3-5 mcg)-5Lf/0.5 mL susp; 0.5 mL by IntraMUSCular route once for 1 dose. 7. Obesity, Class I, BMI 30-34.9 8. Screening for alcoholism -     AR ANNUAL ALCOHOL SCREEN 15 MIN 
 
9. Screening for depression Charisse Hernandez 68 Health Maintenance Due Topic Date Due  MEDICARE YEARLY EXAM  07/25/2018

## 2019-05-15 NOTE — PATIENT INSTRUCTIONS
Try these lifestyle strategies to lower your cholesterol: ? Decrease saturated fats in diet. Saturated fats are found in:  
o meats including fatty beef, pork, lamb, chicken or turkey with skin, and ground beef,  
o high fat cheese,  
o whole fat diary, milk, cream and ice cream, 
o butter 
o most saturated fats are found in animal products ? Eliminate Trans Fats from your diet. Foods that contain TF include: 
o Fast foods: fried chicken, biscuits, fried fish for fried fish sandwichs, [place] fries 
o Donuts and muffins 
o Crackers and cookies 
o Cake, cake icing and pies 
o Canned biscuits or refrigerated dough 
o Microwave popcorn 
o Coffee creamer 
o Frozen pizza 
o Stick margarine or vegetable shortening ? Increase the amount of soluble fiber in your diet. Sources of soluble fiber include: 
o oats, peas, beans, apples, citrus fruits, carrots, barley and psyllium ? Include omega-3 fatty acids in your diet, these are found in:  
o FISH! Try and eat 2 servings of fish a week. Good examples include salmon, albacore tuna, halibut, trout and mackerel. 
o Take a fish oil supplement daily ? Look for foods enriched with plant-sterols. There are many products on the market which are fortified with this nutrient including buttery spreads and yogurts. Look for the Benechol and Promise brands. ? Increase your physical activity to at least 150 minutes a week. This is just 5 sessions of thirty minutes a week! ? Losing weight can significantly lower your cholesterol. ? If you are a smoker, QUIT SMOKING, this can significantly lower your cholesterol and overall cardiovascular risk. It also can help to decrease your risk for many other conditions. Well Visit, Over 72: Care Instructions Your Care Instructions Physical exams can help you stay healthy. Your doctor has checked your overall health and may have suggested ways to take good care of yourself. He or she also may have recommended tests. At home, you can help prevent illness with healthy eating, regular exercise, and other steps. Follow-up care is a key part of your treatment and safety. Be sure to make and go to all appointments, and call your doctor if you are having problems. It's also a good idea to know your test results and keep a list of the medicines you take. How can you care for yourself at home? · Reach and stay at a healthy weight. This will lower your risk for many problems, such as obesity, diabetes, heart disease, and high blood pressure. · Get at least 30 minutes of exercise on most days of the week. Walking is a good choice. You also may want to do other activities, such as running, swimming, cycling, or playing tennis or team sports. · Do not smoke. Smoking can make health problems worse. If you need help quitting, talk to your doctor about stop-smoking programs and medicines. These can increase your chances of quitting for good. · Protect your skin from too much sun. When you're outdoors from 10 a.m. to 4 p.m., stay in the shade or cover up with clothing and a hat with a wide brim. Wear sunglasses that block UV rays. Even when it's cloudy, put broad-spectrum sunscreen (SPF 30 or higher) on any exposed skin. · See a dentist one or two times a year for checkups and to have your teeth cleaned. · Wear a seat belt in the car. · Limit alcohol to 2 drinks a day for men and 1 drink a day for women. Too much alcohol can cause health problems. Follow your doctor's advice about when to have certain tests. These tests can spot problems early. For men and women · Cholesterol. Your doctor will tell you how often to have this done based on your overall health and other things that can increase your risk for heart attack and stroke. · Blood pressure. Have your blood pressure checked during a routine doctor visit.  Your doctor will tell you how often to check your blood pressure based on your age, your blood pressure results, and other factors. · Diabetes. Ask your doctor whether you should have tests for diabetes. · Vision. Experts recommend that you have yearly exams for glaucoma and other age-related eye problems. · Hearing. Tell your doctor if you notice any change in your hearing. You can have tests to find out how well you hear. · Colon cancer tests. Keep having colon cancer tests as your doctor recommends. You can have one of several types of tests. · Heart attack and stroke risk. At least every 4 to 6 years, you should have your risk for heart attack and stroke assessed. Your doctor uses factors such as your age, blood pressure, cholesterol, and whether you smoke or have diabetes to show what your risk for a heart attack or stroke is over the next 10 years. · Osteoporosis. Talk to your doctor about whether you should have a bone density test to find out whether you have thinning bones. Also ask your doctor about whether you should take calcium and vitamin D supplements. For women · Pap test and pelvic exam. You may no longer need a Pap test. Talk with your doctor about whether to stop or continue to have Pap tests. · Breast exam and mammogram. Ask how often you should have a mammogram, which is an X-ray of your breasts. A mammogram can spot breast cancer before it can be felt and when it is easiest to treat. · Thyroid disease. Talk to your doctor about whether to have your thyroid checked as part of a regular physical exam. Women have an increased chance of a thyroid problem. For men · Prostate exam. Talk to your doctor about whether you should have a blood test (called a PSA test) for prostate cancer. Experts disagree on whether men should have this test. Some experts recommend that you discuss the benefits and risks of the test with your doctor. · Abdominal aortic aneurysm.  Ask your doctor whether you should have a test to check for an aneurysm. You may need a test if you ever smoked or if your parent, brother, sister, or child has had an aneurysm. When should you call for help? Watch closely for changes in your health, and be sure to contact your doctor if you have any problems or symptoms that concern you. Where can you learn more? Go to http://chay-caroline.info/. Enter O599 in the search box to learn more about \"Well Visit, Over 65: Care Instructions. \" Current as of: March 28, 2018 Content Version: 11.9 © 8995-2697 [employer]. Care instructions adapted under license by [employer] (which disclaims liability or warranty for this information). If you have questions about a medical condition or this instruction, always ask your healthcare professional. [address] any warranty or liability for your use of this information. Medicare Wellness Visit, Female The best way to live healthy is to have a lifestyle where you eat a well-balanced diet, exercise regularly, limit alcohol use, and quit all forms of tobacco/nicotine, if applicable. Regular preventive services are another way to keep healthy. Preventive services (vaccines, screening tests, monitoring & exams) can help personalize your care plan, which helps you manage your own care. Screening tests can find health problems at the earliest stages, when they are easiest to treat. [facility] follows the current, evidence-based guidelines published by the [place] [employer] (USPSTF) when recommending preventive services for our patients. Because we follow these guidelines, sometimes recommendations change over time as research supports it. (For example, mammograms used to be recommended annually. Even though Medicare will still pay for an annual mammogram, the newer guidelines recommend a mammogram every two years for women of average risk.) Of course, you and your doctor may decide to screen more often for some diseases, based on your risk and your health status. Preventive services for you include: - Medicare offers their members a free annual wellness visit, which is time for you and your primary care provider to discuss and plan for your preventive service needs. Take advantage of this benefit every year! 
-All adults over the age of 72 should receive the recommended pneumonia vaccines. Current USPSTF guidelines recommend a series of two vaccines for the best pneumonia protection.  
-All adults should have a flu vaccine yearly and a tetanus vaccine every 10 years. All adults age 61 and older should receive a shingles vaccine once in their lifetime.   
-A bone mass density test is recommended when a woman turns 65 to screen for osteoporosis. This test is only recommended one time, as a screening. Some providers will use this same test as a disease monitoring tool if you already have osteoporosis. -All adults age 38-68 who are overweight should have a diabetes screening test once every three years.  
-Other screening tests and preventive services for persons with diabetes include: an eye exam to screen for diabetic retinopathy, a kidney function test, a foot exam, and stricter control over your cholesterol.  
-Cardiovascular screening for adults with routine risk involves an electrocardiogram (ECG) at intervals determined by your doctor.  
-Colorectal cancer screenings should be done for adults age 54-65 with no increased risk factors for colorectal cancer. There are a number of acceptable methods of screening for this type of cancer. Each test has its own benefits and drawbacks. Discuss with your doctor what is most appropriate for you during your annual wellness visit. The different tests include: colonoscopy (considered the best screening method), a fecal occult blood test, a fecal DNA test, and sigmoidoscopy. -Breast cancer screenings are recommended every other year for women of normal risk, age 54-69. 
-Cervical cancer screenings for women over age 72 are only recommended with certain risk factors.  
-All adults born between [facility] should be screened once for Hepatitis C. Here is a list of your current Health Maintenance items (your personalized list of preventive services) with a due date: 
Health Maintenance Due Topic Date Due  
 Annual Well Visit  07/25/2018

## 2019-11-13 ENCOUNTER — OFFICE VISIT (OUTPATIENT)
Dept: FAMILY MEDICINE CLINIC | Age: 69
End: 2019-11-13

## 2019-11-13 VITALS
BODY MASS INDEX: 34.23 KG/M2 | OXYGEN SATURATION: 93 % | HEART RATE: 76 BPM | DIASTOLIC BLOOD PRESSURE: 88 MMHG | RESPIRATION RATE: 16 BRPM | SYSTOLIC BLOOD PRESSURE: 136 MMHG | HEIGHT: 62 IN | WEIGHT: 186 LBS | TEMPERATURE: 97.3 F

## 2019-11-13 DIAGNOSIS — I65.22 LEFT CAROTID STENOSIS: ICD-10-CM

## 2019-11-13 DIAGNOSIS — G47.00 INSOMNIA, UNSPECIFIED TYPE: ICD-10-CM

## 2019-11-13 DIAGNOSIS — R03.0 WHITE COAT SYNDROME WITHOUT DIAGNOSIS OF HYPERTENSION: Primary | ICD-10-CM

## 2019-11-13 DIAGNOSIS — Z23 ENCOUNTER FOR IMMUNIZATION: ICD-10-CM

## 2019-11-13 DIAGNOSIS — N18.30 CKD (CHRONIC KIDNEY DISEASE) STAGE 3, GFR 30-59 ML/MIN (HCC): ICD-10-CM

## 2019-11-13 DIAGNOSIS — E78.5 DYSLIPIDEMIA: ICD-10-CM

## 2019-11-13 DIAGNOSIS — M85.80 OSTEOPENIA, UNSPECIFIED LOCATION: ICD-10-CM

## 2019-11-13 NOTE — PROGRESS NOTES
Chief Complaint   Patient presents with    Hypertension     6 month f/u         1. Have you been to the ER, urgent care clinic since your last visit? Hospitalized since your last visit? no    2. Have you seen or consulted any other health care providers outside of the [address] [place] [address] since your last visit? Include any pap smears or colon screening.   no

## 2019-11-13 NOTE — PATIENT INSTRUCTIONS
Of the Fish Oil gummies you showed me, take 2 gummies twice a day. Inquire about the cost of the Heart CT scan for calcium score, which assess risk of coronary artery disease looking for plaques in the arteries. This usually is not covered by insurance, but they may offer a discount in February for Heart Month. Insomnia: Care Instructions  Your Care Instructions    Insomnia is the inability to sleep well. It is a common problem for most people at some time. Insomnia may make it hard for you to get to sleep, stay asleep, or sleep as long as you need to. This can make you tired and grouchy during the day. It can also make you forgetful, less effective at work, and unhappy. Insomnia can be caused by conditions such as depression or anxiety. Pain can also affect your ability to sleep. When these problems are solved, the insomnia usually clears up. But sometimes bad sleep habits can cause insomnia. If insomnia is affecting your work or your enjoyment of life, you can take steps to improve your sleep. Follow-up care is a key part of your treatment and safety. Be sure to make and go to all appointments, and call your doctor if you are having problems. It's also a good idea to know your test results and keep a list of the medicines you take. How can you care for yourself at home? What to avoid  · Do not have drinks with caffeine, such as coffee or black tea, for 8 hours before bed. · Do not smoke or use other types of tobacco near bedtime. Nicotine is a stimulant and can keep you awake. · Avoid drinking alcohol late in the evening, because it can cause you to wake in the middle of the night. · Do not eat a big meal close to bedtime. If you are hungry, eat a light snack. · Do not drink a lot of water close to bedtime, because the need to urinate may wake you up during the night. · Do not read or watch TV in bed. Use the bed only for sleeping and sexual activity.   What to try  · Go to bed at the same time every night, and wake up at the same time every morning. Do not take naps during the day. · Keep your bedroom quiet, dark, and cool. · Sleep on a comfortable pillow and mattress. · If watching the clock makes you anxious, turn it facing away from you so you cannot see the time. · If you worry when you lie down, start a worry book. Well before bedtime, write down your worries, and then set the book and your concerns aside. · Try meditation or other relaxation techniques before you go to bed. · If you cannot fall asleep, get up and go to another room until you feel sleepy. Do something relaxing. Repeat your bedtime routine before you go to bed again. · Make your house quiet and calm about an hour before bedtime. Turn down the lights, turn off the TV, log off the computer, and turn down the volume on music. This can help you relax after a busy day. When should you call for help? Watch closely for changes in your health, and be sure to contact your doctor if:    · Your efforts to improve your sleep do not work.     · Your insomnia gets worse.     · You have been feeling down, depressed, or hopeless or have lost interest in things that you usually enjoy. Where can you learn more? Go to http://chay-caroline.info/. Enter P513 in the search box to learn more about \"Insomnia: Care Instructions. \"  Current as of: April 7, 2019  Content Version: 12.2  © 3297-0179 [employer]. Care instructions adapted under license by [employer] (which disclaims liability or warranty for this information). If you have questions about a medical condition or this instruction, always ask your healthcare professional. [place] [address] any warranty or liability for your use of this information.

## 2019-11-14 LAB
ALBUMIN SERPL-MCNC: 4.6 G/DL (ref 3.6–4.8)
ALBUMIN/GLOB SERPL: 1.6 {RATIO} (ref 1.2–2.2)
ALP SERPL-CCNC: 113 IU/L (ref 39–117)
ALT SERPL-CCNC: 8 IU/L (ref 0–32)
AST SERPL-CCNC: 17 IU/L (ref 0–40)
BASOPHILS # BLD AUTO: 0 X10E3/UL (ref 0–0.2)
BASOPHILS NFR BLD AUTO: 1 %
BILIRUB SERPL-MCNC: 0.5 MG/DL (ref 0–1.2)
BUN SERPL-MCNC: 11 MG/DL (ref 8–27)
BUN/CREAT SERPL: 10 (ref 12–28)
CALCIUM SERPL-MCNC: 10 MG/DL (ref 8.7–10.3)
CHLORIDE SERPL-SCNC: 100 MMOL/L (ref 96–106)
CHOLEST SERPL-MCNC: 257 MG/DL (ref 100–199)
CO2 SERPL-SCNC: 26 MMOL/L (ref 20–29)
CREAT SERPL-MCNC: 1.07 MG/DL (ref 0.57–1)
EOSINOPHIL # BLD AUTO: 0 X10E3/UL (ref 0–0.4)
EOSINOPHIL NFR BLD AUTO: 1 %
ERYTHROCYTE [DISTWIDTH] IN BLOOD BY AUTOMATED COUNT: 14.8 % (ref 12.3–15.4)
GLOBULIN SER CALC-MCNC: 2.8 G/DL (ref 1.5–4.5)
GLUCOSE SERPL-MCNC: 90 MG/DL (ref 65–99)
HCT VFR BLD AUTO: 44.2 % (ref 34–46.6)
HDLC SERPL-MCNC: 58 MG/DL
HGB BLD-MCNC: 14.4 G/DL (ref 11.1–15.9)
IMM GRANULOCYTES # BLD AUTO: 0 X10E3/UL (ref 0–0.1)
IMM GRANULOCYTES NFR BLD AUTO: 0 %
INTERPRETATION, 910389: NORMAL
INTERPRETATION: NORMAL
LDLC SERPL CALC-MCNC: 177 MG/DL (ref 0–99)
LYMPHOCYTES # BLD AUTO: 2.8 X10E3/UL (ref 0.7–3.1)
LYMPHOCYTES NFR BLD AUTO: 45 %
MCH RBC QN AUTO: 27.1 PG (ref 26.6–33)
MCHC RBC AUTO-ENTMCNC: 32.6 G/DL (ref 31.5–35.7)
MCV RBC AUTO: 83 FL (ref 79–97)
MONOCYTES # BLD AUTO: 0.5 X10E3/UL (ref 0.1–0.9)
MONOCYTES NFR BLD AUTO: 8 %
NEUTROPHILS # BLD AUTO: 2.7 X10E3/UL (ref 1.4–7)
NEUTROPHILS NFR BLD AUTO: 45 %
PDF IMAGE, 910387: NORMAL
PLATELET # BLD AUTO: 215 X10E3/UL (ref 150–450)
POTASSIUM SERPL-SCNC: 4.6 MMOL/L (ref 3.5–5.2)
PROT SERPL-MCNC: 7.4 G/DL (ref 6–8.5)
RBC # BLD AUTO: 5.32 X10E6/UL (ref 3.77–5.28)
SODIUM SERPL-SCNC: 140 MMOL/L (ref 134–144)
TRIGL SERPL-MCNC: 109 MG/DL (ref 0–149)
VLDLC SERPL CALC-MCNC: 22 MG/DL (ref 5–40)
WBC # BLD AUTO: 6 X10E3/UL (ref 3.4–10.8)

## 2019-11-18 NOTE — PROGRESS NOTES
Please call her about lab results. Her cholesterol is higher this year, her total is 257 (was 231, goal less than 200) and  (was 146, goal definitely less than 100, but closer to 70 is better since monitoring her kidneys and carotids too). Her HDL and TG look great and stable. I know she is working hard with diet and exercise, please don't stop that since it is also helping her blood pressure and weight. Her increased water intake is helping her kidneys, as her kidney function is slightly improved and now just moved in to CKD stage 2 instead of stage 3! . But we just can't change our genes. I recommend that she start a low dose statin for her cholesterol to decrease her cardiovascular risk. Also remind her to schedule carotid test and research pricing for the heart calcium score test that would need to pay for. Let me know if she is ready to start a statin.

## 2019-11-26 ENCOUNTER — HOSPITAL ENCOUNTER (OUTPATIENT)
Dept: VASCULAR SURGERY | Age: 69
Discharge: HOME | End: 2019-11-26
Payer: MEDICARE

## 2019-11-26 ENCOUNTER — HOSPITAL ENCOUNTER (OUTPATIENT)
Dept: CT IMAGING | Age: 69
Discharge: HOME | End: 2019-11-26
Payer: SELF-PAY

## 2019-11-26 DIAGNOSIS — I65.22 LEFT CAROTID STENOSIS: ICD-10-CM

## 2019-11-26 DIAGNOSIS — E78.5 DYSLIPIDEMIA: ICD-10-CM

## 2019-11-26 PROBLEM — R09.89 BRUIT OF LEFT CAROTID ARTERY: Status: ACTIVE | Noted: 2019-11-26

## 2019-11-26 PROBLEM — I65.23 BILATERAL CAROTID ARTERY STENOSIS: Status: ACTIVE | Noted: 2019-11-26

## 2019-11-26 LAB
LEFT CCA DIST DIAS: 23.6 CM/S
LEFT CCA DIST SYS: 82 CM/S
LEFT CCA PROX DIAS: 27.2 CM/S
LEFT CCA PROX SYS: 85.7 CM/S
LEFT ECA DIAS: 19.9 CM/S
LEFT ECA SYS: 76.5 CM/S
LEFT ICA DIST DIAS: 30.9 CM/S
LEFT ICA DIST SYS: 71.1 CM/S
LEFT ICA MID DIAS: 30.9 CM/S
LEFT ICA MID SYS: 82 CM/S
LEFT ICA PROX DIAS: 32.7 CM/S
LEFT ICA PROX SYS: 83.8 CM/S
LEFT ICA/CCA SYS: 1
LEFT SUBCLAVIAN DIAS: 0 CM/S
LEFT SUBCLAVIAN SYS: 133.2 CM/S
LEFT VERTEBRAL DIAS: 14.4 CM/S
LEFT VERTEBRAL SYS: 34.5 CM/S
RIGHT CCA DIST DIAS: 29 CM/S
RIGHT CCA DIST SYS: 105.8 CM/S
RIGHT CCA PROX DIAS: 24.4 CM/S
RIGHT CCA PROX SYS: 82.1 CM/S
RIGHT ECA DIAS: 23.6 CM/S
RIGHT ECA SYS: 105.8 CM/S
RIGHT ICA DIST DIAS: 19.9 CM/S
RIGHT ICA DIST SYS: 63.7 CM/S
RIGHT ICA MID DIAS: 19.9 CM/S
RIGHT ICA MID SYS: 60.1 CM/S
RIGHT ICA PROX DIAS: 10.8 CM/S
RIGHT ICA PROX SYS: 31.3 CM/S
RIGHT ICA/CCA SYS: 0.6
RIGHT SUBCLAVIAN DIAS: 0 CM/S
RIGHT SUBCLAVIAN SYS: 172.1 CM/S
RIGHT VERTEBRAL DIAS: 12.6 CM/S
RIGHT VERTEBRAL SYS: 45.5 CM/S

## 2019-11-26 PROCEDURE — 93880 EXTRACRANIAL BILAT STUDY: CPT

## 2019-11-26 PROCEDURE — 75571 CT HRT W/O DYE W/CA TEST: CPT

## 2019-12-10 NOTE — PROGRESS NOTES
Ca score 162 = moderate risk. Also elevated CHL, and less than 50% stenosis in both carotids. Recommend starting statin. Letter sent.

## 2019-12-10 NOTE — PROGRESS NOTES
Less than 50% blockage in both carotids. Increased on R from 2012, but stable on L. CHL is elevated. Recommend ASA 81 mg daily and starting a statin to reduce stroke risk. Letter sent.

## 2019-12-19 NOTE — PROGRESS NOTES
I sent her a letter in the mail. Can you call her back with the info from the letters in case it got lost in the mail? Here is the summary: Your heart scan shows moderate evidence of coronary artery disease (calcium score 162). There is less than 50% blockage in both of your carotid arteries. This appears to be increased on the right side from 2012, but stable on the left side. Also, your cholesterol is elevated (see lab review). Therefore: I recommend starting Aspirin 81 mg daily and beginning a \"statin\" medication to lower your cholesterol to reduce your overall risk for stroke. Let me know when ready to start the medicine to lower your cholesterol and I will send in a prescription to your pharmacy.

## 2019-12-19 NOTE — PROGRESS NOTES
Tesha De Paz is a 71 y.o. female  HIPAA verified by two patient identifiers. Spoke with patient informed her Her cholesterol is higher this year, her total is 257 (was 231, goal less than 200) and  (was 146, goal definitely less than 100, but closer to 70 is better since monitoring her kidneys and carotids too). Her HDL and TG look great and stable. I know she is working hard with diet and exercise, please don't stop that since it is also helping her blood pressure and weight. Her increased water intake is helping her kidneys, as her kidney function is slightly improved and now just moved in to CKD stage 2 instead of stage 3! . But we just can't change our genes. I recommend that she start a low dose statin for her cholesterol to decrease her cardiovascular risk. She stated she had carotid test and heart calcium test completed on 11/26/2019. and would like a return call about these test.

## 2019-12-23 ENCOUNTER — TELEPHONE (OUTPATIENT)
Dept: FAMILY MEDICINE CLINIC | Age: 69
End: 2019-12-23

## 2019-12-23 NOTE — TELEPHONE ENCOUNTER
----- Message from Evelyn Ambriz sent at 12/23/2019  9:19 AM EST -----  Regarding: INÉS Thompson/Telephone  Caller's first and last name: Priti Garcia  Reason for call: meds info from a letter  Callback required yes/no and why: yes  Best contact number(s): [phone]  Details to clarify the request: Pt received a letter from [facility] office in regards to starting a statin medication for her cholesterol. Pt is wondering when it is safe to take it and when can she start it.

## 2020-01-06 ENCOUNTER — OFFICE VISIT (OUTPATIENT)
Dept: FAMILY MEDICINE CLINIC | Age: 70
End: 2020-01-06

## 2020-01-06 VITALS
DIASTOLIC BLOOD PRESSURE: 98 MMHG | WEIGHT: 189 LBS | BODY MASS INDEX: 34.78 KG/M2 | RESPIRATION RATE: 18 BRPM | SYSTOLIC BLOOD PRESSURE: 170 MMHG | HEIGHT: 62 IN | TEMPERATURE: 97.7 F | HEART RATE: 94 BPM | OXYGEN SATURATION: 99 %

## 2020-01-06 DIAGNOSIS — E78.5 DYSLIPIDEMIA: ICD-10-CM

## 2020-01-06 DIAGNOSIS — H25.9 AGE-RELATED CATARACT OF BOTH EYES, UNSPECIFIED AGE-RELATED CATARACT TYPE: ICD-10-CM

## 2020-01-06 DIAGNOSIS — Z01.818 PREOP EXAMINATION: Primary | ICD-10-CM

## 2020-01-06 DIAGNOSIS — I10 ESSENTIAL HYPERTENSION: ICD-10-CM

## 2020-01-06 RX ORDER — ATORVASTATIN CALCIUM 20 MG/1
20 TABLET, FILM COATED ORAL
Qty: 90 TAB | Refills: 1 | Status: SHIPPED | OUTPATIENT
Start: 2020-01-06 | End: 2020-03-31 | Stop reason: SDUPTHER

## 2020-01-06 RX ORDER — METOPROLOL SUCCINATE 25 MG/1
25 TABLET, EXTENDED RELEASE ORAL DAILY
Qty: 30 TAB | Refills: 2 | Status: SHIPPED | OUTPATIENT
Start: 2020-01-06 | End: 2020-01-27 | Stop reason: SDUPTHER

## 2020-01-06 NOTE — PATIENT INSTRUCTIONS
Check BP at home with your monitor, bring in your monitor and BP log at next visit    Hold Fish Oil 7 days prior to surgery. After your second cataract surgery is healed, start Aspirin 81 mg enteric coated once daily       Home Blood Pressure Test: About This Test  What is it? A home blood pressure test allows you to keep track of your blood pressure at home. Blood pressure is a measure of the force of blood against the walls of your arteries. Blood pressure readings include two numbers, such as 130/80 (say \"130 over 80\"). The first number is the systolic pressure. The second number is the diastolic pressure. Why is this test done? You may do this test at home to:  · Find out if you have high blood pressure. · Track your blood pressure if you have high blood pressure. · Track how well medicine is working to reduce high blood pressure. · Check how lifestyle changes, such as weight loss and exercise, are affecting blood pressure. How can you prepare for the test?  · Do not use caffeine, tobacco, or medicines known to raise blood pressure (such as nasal decongestant sprays) for at least 30 minutes before taking your blood pressure. · Do not exercise for at least 30 minutes before taking your blood pressure. What happens before the test?  Take your blood pressure while you feel comfortable and relaxed. Sit quietly with both feet on the floor for at least 5 minutes before the test.  What happens during the test?  · Sit with your arm slightly bent and resting on a table so that your upper arm is at the same level as your heart. · Roll up your sleeve or take off your shirt to expose your upper arm. · Wrap the blood pressure cuff around your upper arm so that the lower edge of the cuff is about 1 inch above the bend of your elbow. Proceed with the following steps depending on if you are using an automatic or manual pressure monitor.   Automatic blood pressure monitors  · Press the on/off button on the automatic monitor and wait until the ready-to-measure \"heart\" symbol appears next to zero in the display window. · Press the start button. The cuff will inflate and deflate by itself. · Your blood pressure numbers will appear on the screen. · Write your numbers in your log book, along with the date and time. Manual blood pressure monitors  · Place the earpieces of a stethoscope in your ears, and place the bell of the stethoscope over the artery, just below the cuff. · Close the valve on the rubber inflating bulb. · Squeeze the bulb rapidly with your opposite hand to inflate the cuff until the dial or column of mercury reads about 30 mm Hg higher than your usual systolic pressure. If you do not know your usual pressure, inflate the cuff to 210 mm Hg or until the pulse at your wrist disappears. · Open the pressure valve just slightly by twisting or pressing the valve on the bulb. · As you watch the pressure slowly fall, note the level on the dial at which you first start to hear a pulsing or tapping sound through the stethoscope. This is your systolic blood pressure. · Continue letting the air out slowly. The sounds will become muffled and will finally disappear. Note the pressure when the sounds completely disappear. This is your diastolic blood pressure. Let out all the remaining air. · Write your numbers in your log book, along with the date and time. What else should you know about the test?  It is more accurate to take the average of several readings made throughout the day than to rely on a single reading. It's normal for blood pressure to go up and down throughout the day. Follow-up care is a key part of your treatment and safety. Be sure to make and go to all appointments, and call your doctor if you are having problems. It's also a good idea to keep a list of the medicines you take. Where can you learn more? Go to http://chay-caroline.info/.   Enter C427 in the search box to learn more about \"Home Blood Pressure Test: About This Test.\"  Current as of: April 9, 2019  Content Version: 12.2  © 3957-5770 [address]. Care instructions adapted under license by [employer] (which disclaims liability or warranty for this information). If you have questions about a medical condition or this instruction, always ask your healthcare professional. [address] any warranty or liability for your use of this information.

## 2020-01-06 NOTE — PROGRESS NOTES
Dacia Cohen is a 71 y.o. female  Chief Complaint   Patient presents with    Follow-up     pre op for cataract surgery      Health Maintenance Due   Topic Date Due    DTaP/Tdap/Td series (1 - Tdap) 1961    Shingrix Vaccine Age 50> (1 of 2) 2000    GLAUCOMA SCREENING Q2Y  2019     Visit Vitals  BP (!) 175/99 (BP 1 Location: Left arm, BP Patient Position: Sitting)   Pulse 94   Temp 97.7 °F (36.5 °C) (Oral)   Resp 18   Ht 5' 2\" (1.575 m)   Wt 189 lb (85.7 kg)   SpO2 99%   BMI 34.57 kg/m²     1. Have you been to the ER, urgent care clinic since your last visit? Hospitalized since your last visit? No    2. Have you seen or consulted any other health care providers outside of the [address] [place] [address] since your last visit? Include any pap smears or colon screening.  No

## 2020-01-06 NOTE — PROGRESS NOTES
71 y.o. female presents for pre-op exam for cataract surgery on 1/14/2020 and 1/28/2020 with Dr. Quinten Maria. She also received letter I sent her about her heart CT calcium score 162 which is c/w  Moderate coronary artery disease, and carotids showing less than 50% blockage on both carotids which is increased on the right from 2012, and cholesterol levels higher than last year with  . She is ready to start a statin. I had also advised starting ASSA 81 mg, but she hasn't started this yet. She continues to take fish oil 1000 mg daily, but she stopped her red rice yeast and CoQ10. Patient Active Problem List    Diagnosis    Bilateral carotid artery stenosis    Bruit of left carotid artery    Dyslipidemia    Obesity, Class I, BMI 30-34.9    White coat syndrome without diagnosis of hypertension     exacerbated by stress         Past Medical History:   Diagnosis Date    Dyslipidemia 6/19/2012    History of acute renal failure 06/19/2012    History of chickenpox     History of measles childhood    History of mumps childhood    Obesity, Class I, BMI 30-34.9 6/19/2012    White coat syndrome without diagnosis of hypertension 2005    exacerbated by stress       No cardiac history. No chronic kidney disease (most recent labs show CKD stage 2). No obstructive sleep apnea. Past Surgical History:   Procedure Laterality Date    BIOPSY BREAST Right 12/06/2011    benign     COLONOSCOPY  3/29/2011         HX BREAST BIOPSY Right 2012    us bx negative     HX TOTAL ABDOMINAL HYSTERECTOMY  1974    with Unilateral oopherectomy, b/l salpingectomy due to ectopic pregnancy. No history of adverse anesthesia reactions. No prior bleeding or clotting complications.     Family History   Problem Relation Age of Onset    Heart Disease Mother     Diabetes Mother     Stroke Father 37        heavy alcohol    Liver Disease Brother         cirrhosis    Cancer Neg Hx        Social History     Tobacco Use  Smoking status: Never Smoker    Smokeless tobacco: Never Used   Substance Use Topics    Alcohol use: Yes     Comment: occassionally, 1 drink, 1-2 times a year    Drug use: No       Social History     Patient does not qualify to have social determinant information on file (likely too young). Social History Narrative    Lives with     Has one adopted son. fmr . Multiple brothers and sisters       Outpatient Medications Marked as Taking for the 1/6/20 encounter (Office Visit) with Heaven Thompson PA-C   Medication Sig Dispense Refill    omega 3-dha-epa-fish oil 100-160-1,000 mg cap Take  by mouth.  B.breve-L.acid-L.rham-S.thermo (PROBIOTIC) 3 billion cell chew Take  by mouth.  food supplemt, lactose-reduced (NUTRITIONAL SHAKE) liqd Take 237 mL by mouth every Monday, Wednesday, Friday.  LACTOBACILLUS ACIDOPHILUS (PROBIOTIC PO) Take  by mouth.  cholecalciferol, vitamin d3, (VITAMIN D) 1,000 unit tablet Take 1,000 Units by mouth daily. No Known Allergies    No latex allergy. Review of Systems:  Excellent exercise tolerance. No recent acute illness.   Gen: no fatigue, fever, chills, weight loss or weight gain  Eyes: no excessive tearing, itching, or discharge  Nose: no rhinorrhea, no sinus pain  Mouth: no oral lesions, no sore throat  Resp: no shortness of breath, no wheezing, no cough  CV: no chest pain, no orthopnea, no paroxysmal nocturnal dyspnea, no lower extremity edema, no palpitations  Abd: no nausea, no heartburn, no diarrhea, no constipation, no abdominal pain  Neuro: no headaches, no syncope or presyncopal episodes  Endo: no polyuria, no polydipsia  Heme: no lymphadenopathy, no easy bruising or bleeding    Visit Vitals  BP (!) 170/98 (BP 1 Location: Right arm, BP Patient Position: Sitting)   Pulse 94   Temp 97.7 °F (36.5 °C) (Oral)   Resp 18   Ht 5' 2\" (1.575 m)   Wt 189 lb (85.7 kg)   LMP 01/01/1974   SpO2 99%   BMI 34.57 kg/m²     Gen: alert, oriented, no acute distress  Ears: external auditory canals clear, TMs without erythema or effusion  Eyes: pupils equal round reactive to light, sclera clear, conjunctiva clear  Oral: moist mucus membranes, no oral lesions, no pharyngeal inflammation or exudate  Neck: no lymphadenopathy  Resp: no increase work of breathing, lungs clear to ausculation bilaterally, no wheezing, rales or rhonchi  CV: S1, S2 normal. No murmurs, rubs, or gallops. Abd: soft, not tender, not distended. No hepatosplenomegaly. Normal bowel sounds. Neuro: cranial nerves intact, normal strength and movement in all extremities, sensation intact and symmetric. Skin: no lesion or rash  Extremities: no cyanosis or edema    Lab Results   Component Value Date/Time    WBC 6.0 11/13/2019 11:51 AM    HGB 14.4 11/13/2019 11:51 AM    HCT 44.2 11/13/2019 11:51 AM    PLATELET 363 54/48/8519 11:51 AM    MCV 83 11/13/2019 11:51 AM       No results found for: HBA1C, HGBE8, CKH5WYHL, PRM9BPYB    Lab Results   Component Value Date/Time    Sodium 140 11/13/2019 11:51 AM    Potassium 4.6 11/13/2019 11:51 AM    Chloride 100 11/13/2019 11:51 AM    CO2 26 11/13/2019 11:51 AM    Glucose 90 11/13/2019 11:51 AM    BUN 11 11/13/2019 11:51 AM    Creatinine 1.07 (H) 11/13/2019 11:51 AM    BUN/Creatinine ratio 10 (L) 11/13/2019 11:51 AM    GFR est AA 61 11/13/2019 11:51 AM    GFR est non-AA 53 (L) 11/13/2019 11:51 AM    Calcium 10.0 11/13/2019 11:51 AM    Bilirubin, total 0.5 11/13/2019 11:51 AM    AST (SGOT) 17 11/13/2019 11:51 AM    Alk.  phosphatase 113 11/13/2019 11:51 AM    Protein, total 7.4 11/13/2019 11:51 AM    Albumin 4.6 11/13/2019 11:51 AM    A-G Ratio 1.6 11/13/2019 11:51 AM    ALT (SGPT) 8 11/13/2019 11:51 AM       No results found for: INR, PTMR, PTP, PT1, PT2, INREXT    Lab Results   Component Value Date/Time    Cholesterol, total 257 (H) 11/13/2019 11:51 AM    HDL Cholesterol 58 11/13/2019 11:51 AM    LDL, calculated 177 (H) 11/13/2019 11:51 AM VLDL, calculated 22 11/13/2019 11:51 AM    Triglyceride 109 11/13/2019 11:51 AM         Assessment/Plan:    1. Preop examination  BP elevated today, 170/98, she is nervous about surgery and her cardiovascular results. She has had borderline/white-coat HTN for a while, with elevated lipids, calcium score, and less than 50% stenosis in carotids but has increased on the right, I recommend starting anti-hypertensive medication and returning next wk for recheck BP before surgery. Start Toprol XL 25 mg today, F/U 1 wk BP check. 2. Age-related cataract of both eyes, unspecified age-related cataract type  cataract surgery on 1/14/2020 and 1/28/2020 with Dr. Jeny Manriquez    3. Dyslipidemia  , she is not on statin  -heart CT calcium score 162 which is c/w  Moderate coronary artery disease, and carotid duplex showing less than 50% blockage on both carotids which is increased on the right from 2012  -to start ASA 81 mg enteric coated AFTER 2nd cataract surgery is healed  -will continue fish oil, but will hold beginning 7d prior to surgery  -new start atorvastatin, f/u 3 months for recheck labs  - atorvastatin (LIPITOR) 20 mg tablet; Take 1 Tab by mouth nightly. Dispense: 90 Tab; Refill: 1    4. Essential hypertension  170/98  -she is nervous about surgery and her cardiovascular results. She has had borderline/white-coat HTN for a while, with elevated lipids, calcium score, and less than 50% stenosis in carotids but has increased on the right, I recommend starting anti-hypertensive medication and returning next wk for recheck BP before surgery. -monitor BP at home and bring in home cuff and log  - metoprolol succinate (TOPROL-XL) 25 mg XL tablet; Take 1 Tab by mouth daily. Dispense: 30 Tab; Refill: 2        Medically cleared for above procedure.     7 days prior to surgery, hold the following medications:  Fish oil    On the morning of surgery, take:  Toprol XL    Verbal and written instructions (see AVS) provided. Patient expresses understanding of diagnosis and treatment plan. Follow-up and Dispositions    · Return in about 1 week (around 1/13/2020) for HTN.        Future Appointments   Date Time Provider Sunny Cid   1/13/2020 11:00 AM Marlen Thompson PA-C [facility] [address]

## 2020-01-13 ENCOUNTER — OFFICE VISIT (OUTPATIENT)
Dept: FAMILY MEDICINE CLINIC | Age: 70
End: 2020-01-13

## 2020-01-13 ENCOUNTER — TELEPHONE (OUTPATIENT)
Dept: FAMILY MEDICINE CLINIC | Age: 70
End: 2020-01-13

## 2020-01-13 VITALS
DIASTOLIC BLOOD PRESSURE: 94 MMHG | TEMPERATURE: 98.5 F | HEIGHT: 62 IN | OXYGEN SATURATION: 98 % | SYSTOLIC BLOOD PRESSURE: 144 MMHG | WEIGHT: 187 LBS | HEART RATE: 86 BPM | RESPIRATION RATE: 18 BRPM | BODY MASS INDEX: 34.41 KG/M2

## 2020-01-13 DIAGNOSIS — F41.8 SITUATIONAL ANXIETY: ICD-10-CM

## 2020-01-13 DIAGNOSIS — I10 ESSENTIAL HYPERTENSION: Primary | ICD-10-CM

## 2020-01-13 RX ORDER — BUSPIRONE HYDROCHLORIDE 10 MG/1
TABLET ORAL
Qty: 20 TAB | Refills: 0 | Status: SHIPPED | OUTPATIENT
Start: 2020-01-13 | End: 2021-05-13 | Stop reason: SDUPTHER

## 2020-01-13 NOTE — PROGRESS NOTES
Conner Crow is a 71 y.o. female    Chief Complaint   Patient presents with    Follow-up     med check       1. Have you been to the ER, urgent care clinic since your last visit? Hospitalized since your last visit? No      2. Have you seen or consulted any other health care providers outside of the [address] [place] [address] since your last visit? Include any pap smears or colon screening.   No

## 2020-01-13 NOTE — TELEPHONE ENCOUNTER
Please call her ophthalmologist at [facility], Michael Posada, [phone]. She is having cataract removal tomorrow, 1/14/2020, by Dr. Aydee Moreira. I wanted to let them know, I had started her on Toprol XL 25 mg last week for hypertension, and her BP in the office today was 144/94. She is feeling anxious about the upcoming cataract procedure, so I gave her Buspar today to take twice today and in the morning before her procedure (and every 8-12 hrs as needed for anxiety).

## 2020-01-13 NOTE — PROGRESS NOTES
Subjective:     Ariadne Colvin is a 71 y.o. female who presents for follow up of hypertension . At her last visit I started her on Toprol XL 25 mg. She also started atorvastatin 20 mg for hyperlipidemia. She is taking medications as directed. Home BP Monitoring 138/88. She denies chest pain, headaches, shortness of breath, vision change and lower extremity edema. She is very anxious about her cataract surgery tomorrow. Past Medical History:   Diagnosis Date    Dyslipidemia 6/19/2012    History of acute renal failure 06/19/2012    History of chickenpox     History of measles childhood    History of mumps childhood    Obesity, Class I, BMI 30-34.9 6/19/2012    White coat syndrome without diagnosis of hypertension 2005    exacerbated by stress     Social History     Tobacco Use    Smoking status: Never Smoker    Smokeless tobacco: Never Used   Substance Use Topics    Alcohol use: Yes     Comment: occassionally, 1 drink, 1-2 times a year     family history includes Diabetes in her mother; Heart Disease in her mother; Liver Disease in her brother; Stroke (age of onset: 37) in her father. Outpatient Medications Marked as Taking for the 1/13/20 encounter (Office Visit) with Jarrett Aj PA-C   Medication Sig Dispense Refill    metoprolol succinate (TOPROL-XL) 25 mg XL tablet Take 1 Tab by mouth daily. 30 Tab 2    atorvastatin (LIPITOR) 20 mg tablet Take 1 Tab by mouth nightly. 90 Tab 1    omega 3-dha-epa-fish oil 100-160-1,000 mg cap Take  by mouth.  B.breve-L.acid-L.rham-S.thermo (PROBIOTIC) 3 billion cell chew Take  by mouth.  food supplemt, lactose-reduced (NUTRITIONAL SHAKE) liqd Take 237 mL by mouth every Monday, Wednesday, Friday.  cholecalciferol, vitamin d3, (VITAMIN D) 1,000 unit tablet Take 1,000 Units by mouth daily.        No Known Allergies    Review of Systems:  As per HPI    Objective:     Visit Vitals  BP (!) 144/94 (BP 1 Location: Right arm, BP Patient Position: Sitting)   Pulse 86   Temp 98.5 °F (36.9 °C) (Oral)   Resp 18   Ht 5' 2\" (1.575 m)   Wt 187 lb (84.8 kg)   LMP 01/01/1974   SpO2 98%   BMI 34.20 kg/m²     General:   alert, cooperative and no distress   Eyes: conjunctivae/sclerae clear. PERRL, EOM's intact   Neck: Supple   Heart: S1 and S2 normal,no murmurs noted    Lungs: Clear to auscultation bilaterally, no increased work of breathing   Extremities: No edema or cyanosis           Assessment/Plan:       1. Essential hypertension - 144/94 (previously 170/98). Now on Toprol XL 25 mg daily x 1 week. she is nervous about cataract surgery tomorrow and her cardiovascular results. She has had borderline/white-coat HTN for a while, with elevated lipids, calcium score, and less than 50% stenosis in carotids but has increased on the right, I again recommended starting anti-hypertensive medication on 1/06/2020 which she agree to  -will add Buspar to help her anxiety  -keep toprol XL 25 mg same dose  -she is to call me next week with BP results from home monitoring (I encouraged MyChart use but she states her computer was hacked)  -f/u 3-4 wks for recheck after her surgery  -see phone note, I spoke with her ophthalmologist Dr. Koffi aH about today's visit, /94, cont Toprol XL 25 mg and add Buspar, he is in agreement      2. Situational anxiety - anxious about cataract surgery, will start Buspar 5-10 mg BID today and tomorrow to take the morning of her surgery, and again for 2 days starting the day before her second eye is done in 2 wks, may also use prn. She is leery of medications and although a candidate for benzo's, she did not want controlled substance. Orders Placed This Encounter    busPIRone (BUSPAR) 10 mg tablet     Sig: Take 1/2 - 1 tab po q8-12 hrs prn anxiety     Dispense:  20 Tab     Refill:  0         Recommended healthy diet low in carbohydrates, fats, sodium and cholesterol.   Recommended regular cardiovascular exercise 3-6 times per week for 30-60 minutes daily. Verbal and written instructions (see AVS) provided. Patient expresses understanding of diagnosis and treatment plan. Follow-up and Dispositions    · Return in about 3 weeks (around 2/3/2020) for HTN.        Future Appointments   Date Time Provider Sunny Cid   2/12/2020 11:00 AM Augie Thompson PA-C [facility] [address]

## 2020-01-13 NOTE — TELEPHONE ENCOUNTER
Dr Terence Jason returned my call. I spoke with him about the new medication additions, see below. He is aware and will treat any anxiety acutely in the office at the time of her procedure, likely profafol and/or versed.

## 2020-01-27 ENCOUNTER — TELEPHONE (OUTPATIENT)
Dept: FAMILY MEDICINE CLINIC | Age: 70
End: 2020-01-27

## 2020-01-27 DIAGNOSIS — I10 ESSENTIAL HYPERTENSION: ICD-10-CM

## 2020-01-27 RX ORDER — METOPROLOL SUCCINATE 25 MG/1
25 TABLET, EXTENDED RELEASE ORAL DAILY
Qty: 90 TAB | Refills: 1 | Status: SHIPPED | OUTPATIENT
Start: 2020-01-27 | End: 2020-07-07

## 2020-01-27 NOTE — TELEPHONE ENCOUNTER
Please inform her the blood pressure readings look great!! I refilled her metoprolol. Thinking of her regarding her procedure tomorrow!

## 2020-01-27 NOTE — TELEPHONE ENCOUNTER
Patient was called and Informed That Metoprolol was refilled. Regarding the eye procedure tomorrow. Patient acknowledge it and said thank you.

## 2020-01-27 NOTE — TELEPHONE ENCOUNTER
PCP: Suman Mccoy PA-C    Last appt: 1/13/2020  Future Appointments   Date Time Provider Sunny Cid   2/12/2020 11:00 AM Lucina Thompson PA-C [facility]       Requested Prescriptions     Pending Prescriptions Disp Refills    metoprolol succinate (TOPROL-XL) 25 mg XL tablet 30 Tab 2     Sig: Take 1 Tab by mouth daily. Pharmacy [facility]    Patient has 7 days' supply of medication available.     Prior labs and Blood pressures:  BP Readings from Last 3 Encounters:   01/13/20 (!) 144/94   01/06/20 (!) 170/98   11/13/19 136/88     Lab Results   Component Value Date/Time    Sodium 140 11/13/2019 11:51 AM    Potassium 4.6 11/13/2019 11:51 AM    Chloride 100 11/13/2019 11:51 AM    CO2 26 11/13/2019 11:51 AM    Glucose 90 11/13/2019 11:51 AM    BUN 11 11/13/2019 11:51 AM    Creatinine 1.07 (H) 11/13/2019 11:51 AM    BUN/Creatinine ratio 10 (L) 11/13/2019 11:51 AM    GFR est AA 61 11/13/2019 11:51 AM    GFR est non-AA 53 (L) 11/13/2019 11:51 AM    Calcium 10.0 11/13/2019 11:51 AM     No results found for: HBA1C, HGBE8, WON7YOMR, QNK5NSYI  Lab Results   Component Value Date/Time    Cholesterol, total 257 (H) 11/13/2019 11:51 AM    HDL Cholesterol 58 11/13/2019 11:51 AM    LDL, calculated 177 (H) 11/13/2019 11:51 AM    VLDL, calculated 22 11/13/2019 11:51 AM    Triglyceride 109 11/13/2019 11:51 AM     Lab Results   Component Value Date/Time    VITAMIN D, 25-HYDROXY 31.2 11/07/2018 12:53 PM       Lab Results   Component Value Date/Time    TSH 2.370 06/01/2016 10:47 AM

## 2020-01-27 NOTE — TELEPHONE ENCOUNTER
The patient is on a new medication and wanted to report her readings:    1/21 bp 135/76  1/22 bp 133/75  1/23 bp 135/88  1/24bp 132/88  1/25 bp 126/85    The patient is getting surgery on her right eye tomorrow 1/28/2020

## 2020-02-12 ENCOUNTER — OFFICE VISIT (OUTPATIENT)
Dept: FAMILY MEDICINE CLINIC | Age: 70
End: 2020-02-12

## 2020-02-12 VITALS
RESPIRATION RATE: 18 BRPM | BODY MASS INDEX: 35.06 KG/M2 | DIASTOLIC BLOOD PRESSURE: 78 MMHG | SYSTOLIC BLOOD PRESSURE: 134 MMHG | WEIGHT: 190.5 LBS | HEIGHT: 62 IN | TEMPERATURE: 98.1 F | HEART RATE: 85 BPM | OXYGEN SATURATION: 98 %

## 2020-02-12 DIAGNOSIS — E78.5 DYSLIPIDEMIA: ICD-10-CM

## 2020-02-12 DIAGNOSIS — F41.8 SITUATIONAL ANXIETY: ICD-10-CM

## 2020-02-12 DIAGNOSIS — I10 ESSENTIAL HYPERTENSION: Primary | ICD-10-CM

## 2020-02-12 RX ORDER — KETOROLAC TROMETHAMINE 5 MG/ML
SOLUTION OPHTHALMIC
COMMUNITY
Start: 2020-01-10 | End: 2021-05-13 | Stop reason: ALTCHOICE

## 2020-02-12 RX ORDER — PREDNISOLONE ACETATE 10 MG/ML
SUSPENSION/ DROPS OPHTHALMIC
COMMUNITY
Start: 2020-01-10 | End: 2021-05-13 | Stop reason: ALTCHOICE

## 2020-02-12 RX ORDER — OFLOXACIN 3 MG/ML
SOLUTION/ DROPS OPHTHALMIC
COMMUNITY
Start: 2020-01-10 | End: 2021-05-13 | Stop reason: ALTCHOICE

## 2020-02-12 NOTE — PROGRESS NOTES
Subjective:     Vasiliy Esparza is a 71 y.o. female who presents for follow up of hypertension and situational anxiety. She is taking medications as directed. I started her on Toprol XL 25 mg last month with elevated BP and feeling anxious about upcoming cataract surgery. I also gave her Buspar 10 mg to take 1/2 - 1 tab BID prn, which she states she took 1/2 tab BID the day before and the day of the surgery with mild benefit. Home BP Monitoring she called me with readings last month after starting the medicine 126-135/76-88. .  She denies chest pain, headaches, shortness of breath, vision change and lower extremity edema. She started atorvastatin 20 mg last month, tolerating well. Past Medical History:   Diagnosis Date    Dyslipidemia 6/19/2012    History of acute renal failure 06/19/2012    History of chickenpox     History of measles childhood    History of mumps childhood    Obesity, Class I, BMI 30-34.9 6/19/2012    White coat syndrome without diagnosis of hypertension 2005    exacerbated by stress     Social History     Tobacco Use    Smoking status: Never Smoker    Smokeless tobacco: Never Used   Substance Use Topics    Alcohol use: Yes     Comment: occassionally, 1 drink, 1-2 times a year     family history includes Diabetes in her mother; Heart Disease in her mother; Liver Disease in her brother; Stroke (age of onset: 37) in her father.   Outpatient Medications Marked as Taking for the 2/12/20 encounter (Office Visit) with Heaven Thompson PA-C   Medication Sig Dispense Refill    ketorolac (ACULAR) 0.5 % ophthalmic solution INSTILL 1 DROP INTO SURGERY EVERY 4 TIMES A DAY, STARTING 2 DAYS PRIOR TO SURGERY      ofloxacin (FLOXIN) 0.3 % ophthalmic solution INSTILL 1 DROP 4 TIMES INTO SURGERY EYE EVERY DAY, START 2 DAYS PRIOR TO SURGERY      prednisoLONE acetate (PRED FORTE) 1 % ophthalmic suspension INSTILL 1 DROP 4 TIMES INTO SURGERY EYE EVERY DAY FOR 1 WEEK THEN TAPER AS INSTRUCTED      aspirin (ASPIR-81 PO) Take  by mouth.  metoprolol succinate (TOPROL-XL) 25 mg XL tablet Take 1 Tab by mouth daily. 90 Tab 1    busPIRone (BUSPAR) 10 mg tablet Take 1/2 - 1 tab po q8-12 hrs prn anxiety 20 Tab 0    atorvastatin (LIPITOR) 20 mg tablet Take 1 Tab by mouth nightly. 90 Tab 1    B.breve-L.acid-L.rham-S.thermo (PROBIOTIC) 3 billion cell chew Take  by mouth.  food supplemt, lactose-reduced (NUTRITIONAL SHAKE) liqd Take 237 mL by mouth every Monday, Wednesday, Friday.  LACTOBACILLUS ACIDOPHILUS (PROBIOTIC PO) Take  by mouth.  cholecalciferol, vitamin d3, (VITAMIN D) 1,000 unit tablet Take 1,000 Units by mouth daily. No Known Allergies    Review of Systems:  GEN: no weight loss, weight gain, fatigue or night sweats  CV: no PND, orthopnea, or palpitations  Resp: no wheeze, no cough  Abd: no nausea, vomiting or diarrhea  Endocrine: no hair loss, excessive thirst or polyuria    Objective:     Visit Vitals  /78 (BP 1 Location: Right arm, BP Patient Position: Sitting)   Pulse 85   Temp 98.1 °F (36.7 °C) (Oral)   Resp 18   Ht 5' 2\" (1.575 m)   Wt 190 lb 8 oz (86.4 kg)   LMP 01/01/1974   SpO2 98%   BMI 34.84 kg/m²     General:   alert, cooperative and no distress   Eyes: conjunctivae/sclerae clear. PERRL, EOM's intact   Heart: S1 and S2 normal,no murmurs noted    Lungs: Clear to auscultation bilaterally, no increased work of breathing   Extremities: No edema or cyanosis           Assessment/Plan:       1. Essential hypertension - 134/78 (previously 144/94, 170/98). Now on Toprol XL 25 mg daily. HTN - well controlled. Continue current medication. Exercise, and low salt/ low caffeine diet were discussed. 2. Situational anxiety - relievd that her cataract surgery is over, may take Buspar 5-10 mg BID prn   3.  Dyslipidemia - , started atorvastatin 20 mg 1/06/2020 with good tolerance  -heart CT calcium score 162 which is c/w  Moderate coronary artery disease, and carotid duplex showing less than 50% blockage on both carotids which is increased on the right from 2012  -to start ASA 81 mg enteric coated and resume fish oil now that cataract surgery is healed  -recheck lipids in April, order placed       Orders Placed This Encounter    LIPID PANEL     Standing Status:   Future     Standing Expiration Date:   8/12/2020    HEPATIC FUNCTION PANEL     Standing Status:   Future     Standing Expiration Date:   8/12/2020         Recommended healthy diet low in carbohydrates, fats, sodium and cholesterol. Recommended regular cardiovascular exercise 3-6 times per week for 30-60 minutes daily. Verbal and written instructions (see AVS) provided. Patient expresses understanding of diagnosis and treatment plan. Follow-up and Dispositions    · Return in about 4 months (around 6/12/2020) for HTN; also 2nd appt for fasting lab-only in April.        Future Appointments   Date Time Provider Sunny Cid   2/14/2020  9:00 AM [facility]   4/6/2020 10:00 AM LAB [facility] SCHED   6/10/2020 11:00 AM Heaven Thompson, PACharisseC [facility] SCHED

## 2020-02-12 NOTE — PATIENT INSTRUCTIONS
Have fasting labs done around April 6th or later in the following 1-2 weeks. Schedule a lab-only appointment. Home Blood Pressure Test: About This Test  What is it? A home blood pressure test allows you to keep track of your blood pressure at home. Blood pressure is a measure of the force of blood against the walls of your arteries. Blood pressure readings include two numbers, such as 130/80 (say \"130 over 80\"). The first number is the systolic pressure. The second number is the diastolic pressure. Why is this test done? You may do this test at home to:  · Find out if you have high blood pressure. · Track your blood pressure if you have high blood pressure. · Track how well medicine is working to reduce high blood pressure. · Check how lifestyle changes, such as weight loss and exercise, are affecting blood pressure. How can you prepare for the test?  · Do not use caffeine, tobacco, or medicines known to raise blood pressure (such as nasal decongestant sprays) for at least 30 minutes before taking your blood pressure. · Do not exercise for at least 30 minutes before taking your blood pressure. What happens before the test?  Take your blood pressure while you feel comfortable and relaxed. Sit quietly with both feet on the floor for at least 5 minutes before the test.  What happens during the test?  · Sit with your arm slightly bent and resting on a table so that your upper arm is at the same level as your heart. · Roll up your sleeve or take off your shirt to expose your upper arm. · Wrap the blood pressure cuff around your upper arm so that the lower edge of the cuff is about 1 inch above the bend of your elbow. Proceed with the following steps depending on if you are using an automatic or manual pressure monitor.   Automatic blood pressure monitors  · Press the on/off button on the automatic monitor and wait until the ready-to-measure \"heart\" symbol appears next to zero in the display window. · Press the start button. The cuff will inflate and deflate by itself. · Your blood pressure numbers will appear on the screen. · Write your numbers in your log book, along with the date and time. Manual blood pressure monitors  · Place the earpieces of a stethoscope in your ears, and place the bell of the stethoscope over the artery, just below the cuff. · Close the valve on the rubber inflating bulb. · Squeeze the bulb rapidly with your opposite hand to inflate the cuff until the dial or column of mercury reads about 30 mm Hg higher than your usual systolic pressure. If you do not know your usual pressure, inflate the cuff to 210 mm Hg or until the pulse at your wrist disappears. · Open the pressure valve just slightly by twisting or pressing the valve on the bulb. · As you watch the pressure slowly fall, note the level on the dial at which you first start to hear a pulsing or tapping sound through the stethoscope. This is your systolic blood pressure. · Continue letting the air out slowly. The sounds will become muffled and will finally disappear. Note the pressure when the sounds completely disappear. This is your diastolic blood pressure. Let out all the remaining air. · Write your numbers in your log book, along with the date and time. What else should you know about the test?  It is more accurate to take the average of several readings made throughout the day than to rely on a single reading. It's normal for blood pressure to go up and down throughout the day. Follow-up care is a key part of your treatment and safety. Be sure to make and go to all appointments, and call your doctor if you are having problems. It's also a good idea to keep a list of the medicines you take. Where can you learn more? Go to http://chay-caroline.info/.   Enter C427 in the search box to learn more about \"Home Blood Pressure Test: About This Test.\"  Current as of: April 9, 2019  Content Version: 12.2  © 3910-2288 [employer]. Care instructions adapted under license by [employer] (which disclaims liability or warranty for this information). If you have questions about a medical condition or this instruction, always ask your healthcare professional. [address] any warranty or liability for your use of this information.

## 2020-02-12 NOTE — PROGRESS NOTES
Yasemin Saleh is a 71 y.o. female  Chief Complaint   Patient presents with    Follow-up     f/u for after the cataract surgery     Health Maintenance Due   Topic Date Due    DTaP/Tdap/Td series (1 - Tdap) 04/03/1961    Shingrix Vaccine Age 50> (1 of 2) 04/03/2000    GLAUCOMA SCREENING Q2Y  11/28/2019     Visit Vitals  BP (!) 146/92   Pulse 85   Temp 98.1 °F (36.7 °C) (Oral)   Resp 18   Ht 5' 2\" (1.575 m)   Wt 190 lb 8 oz (86.4 kg)   SpO2 98%   BMI 34.84 kg/m²     1. Have you been to the ER, urgent care clinic since your last visit? Hospitalized since your last visit? No    2. Have you seen or consulted any other health care providers outside of the [address] [place] [address] since your last visit? Include any pap smears or colon screening.   Eye doctor for cataract surgery

## 2020-02-14 ENCOUNTER — HOSPITAL ENCOUNTER (OUTPATIENT)
Dept: MAMMOGRAPHY | Age: 70
Discharge: HOME | End: 2020-02-14
Payer: MEDICARE

## 2020-02-14 DIAGNOSIS — Z12.31 VISIT FOR SCREENING MAMMOGRAM: ICD-10-CM

## 2020-02-14 PROCEDURE — 77067 SCR MAMMO BI INCL CAD: CPT

## 2020-03-31 DIAGNOSIS — E78.5 DYSLIPIDEMIA: ICD-10-CM

## 2020-03-31 NOTE — TELEPHONE ENCOUNTER
PCP: Jace Snaon PA-C    Last appt: 2/12/2020  Future Appointments   Date Time Provider Sunny Palak   4/6/2020 10:00 AM LAB [facility] SCHED   6/10/2020 11:00 AM Heaven Thompson PA-C [facility] SCHED       Requested Prescriptions     Pending Prescriptions Disp Refills    atorvastatin (LIPITOR) 20 mg tablet 90 Tab 1     Sig: Take 1 Tab by mouth nightly. [facility]    Patient has ? days' supply of medication available.     Prior labs and Blood pressures:  BP Readings from Last 3 Encounters:   02/12/20 134/78   01/13/20 (!) 144/94   01/06/20 (!) 170/98     Lab Results   Component Value Date/Time    Sodium 140 11/13/2019 11:51 AM    Potassium 4.6 11/13/2019 11:51 AM    Chloride 100 11/13/2019 11:51 AM    CO2 26 11/13/2019 11:51 AM    Glucose 90 11/13/2019 11:51 AM    BUN 11 11/13/2019 11:51 AM    Creatinine 1.07 (H) 11/13/2019 11:51 AM    BUN/Creatinine ratio 10 (L) 11/13/2019 11:51 AM    GFR est AA 61 11/13/2019 11:51 AM    GFR est non-AA 53 (L) 11/13/2019 11:51 AM    Calcium 10.0 11/13/2019 11:51 AM     No results found for: HBA1C, HGBE8, AXE4GHPX, PPT7PZYZ  Lab Results   Component Value Date/Time    Cholesterol, total 257 (H) 11/13/2019 11:51 AM    HDL Cholesterol 58 11/13/2019 11:51 AM    LDL, calculated 177 (H) 11/13/2019 11:51 AM    VLDL, calculated 22 11/13/2019 11:51 AM    Triglyceride 109 11/13/2019 11:51 AM     Lab Results   Component Value Date/Time    VITAMIN D, 25-HYDROXY 31.2 11/07/2018 12:53 PM       Lab Results   Component Value Date/Time    TSH 2.370 06/01/2016 10:47 AM

## 2020-04-01 RX ORDER — ATORVASTATIN CALCIUM 20 MG/1
20 TABLET, FILM COATED ORAL
Qty: 90 TAB | Refills: 1 | Status: SHIPPED | OUTPATIENT
Start: 2020-04-01 | End: 2020-09-24

## 2020-04-06 DIAGNOSIS — E78.5 DYSLIPIDEMIA: ICD-10-CM

## 2020-06-15 ENCOUNTER — TELEPHONE (OUTPATIENT)
Dept: FAMILY MEDICINE CLINIC | Age: 70
End: 2020-06-15

## 2020-06-15 NOTE — TELEPHONE ENCOUNTER
The patient reported to me that she is having issues with her gout in her left leg and left toe. She also said that she has been taking her atorvastatin which had made the swelling better but it is still swollen. She has also taking 2 asprins a day totaling a possible 325 mg and using an ice pack. She also said that she has been coughing for two months with watery eyes and suspected ear ache. She said her family member has been around large crowds so she is isolating her self from her and put herself in self quarantine She will like a prescription for gout if possible sent to [facility] as soon as possible today.

## 2020-06-16 NOTE — TELEPHONE ENCOUNTER
Pt called in with reports of suspected gout. Pt states her big toe is swollen and hot to the touch. Pt states she has never been diagnosed with gout and that this is the first time she has experienced something like this. Asked pt if she would be willing to come into the office this afternoon to have it looked at. Pt states that she does not want to go into any [facility] or Urgent Care, Hospital due to Covid-19. Asked pt if she would be willing to do a VV tomorrow with PCP. She states she will do the visit but does not have a camera phone so informed her that it will be a Telephone call. Scheduled with PCP 06/17/20 at 8:00AM with PCP. Verified understanding.

## 2020-06-17 ENCOUNTER — VIRTUAL VISIT (OUTPATIENT)
Dept: FAMILY MEDICINE CLINIC | Age: 70
End: 2020-06-17

## 2020-06-17 DIAGNOSIS — M79.675 GREAT TOE PAIN, LEFT: Primary | ICD-10-CM

## 2020-06-17 DIAGNOSIS — M79.675 GREAT TOE PAIN, LEFT: ICD-10-CM

## 2020-06-17 DIAGNOSIS — E78.5 DYSLIPIDEMIA: ICD-10-CM

## 2020-06-17 DIAGNOSIS — J30.1 SEASONAL ALLERGIC RHINITIS DUE TO POLLEN: ICD-10-CM

## 2020-06-17 DIAGNOSIS — N18.30 CKD (CHRONIC KIDNEY DISEASE) STAGE 3, GFR 30-59 ML/MIN (HCC): ICD-10-CM

## 2020-06-17 RX ORDER — COLCHICINE 0.6 MG/1
0.3 TABLET ORAL 2 TIMES DAILY
Qty: 30 TAB | Refills: 1 | Status: SHIPPED | OUTPATIENT
Start: 2020-06-17 | End: 2020-07-14

## 2020-06-17 NOTE — PROGRESS NOTES
Natasha Atkinson is a 79 y.o. female evaluated via audio only technology on 6/17/2020. Consent: She and/or her health care decision maker is aware that she may receive a bill for this audio only encounter, depending on her insurance coverage, and has provided verbal consent to proceed: Yes    I communicated with the patient and/or health care decision maker about the nature and details of the following:  Assessment & Plan:   Diagnoses and all orders for this visit:    1. Great toe pain, left  -     colchicine 0.6 mg tablet; Take 0.5 Tabs by mouth two (2) times a day. -     CBC WITH AUTOMATED DIFF; Future  -     METABOLIC PANEL, COMPREHENSIVE; Future  -     URIC ACID; Future    2. Dyslipidemia  -     LIPID PANEL; Future    3. CKD (chronic kidney disease) stage 3, GFR 30-59 ml/min ([facility])  -     METABOLIC PANEL, COMPREHENSIVE; Future    4. Seasonal allergic rhinitis due to pollen      Lab order form and AVS mailed. Take the colchicine 0.6 mg, 1/2 tablet twice a day, until the toe feels better. If you develop diarrhea, then drop down to taking the 1/2 tablet just once daily. If diarrhea continues, then stop the pill and call me. Have labs done at least 2 weeks after the toe feels better. The labs are not urgent, so go only go out to a [facility] when you feel safe to do so, due to the COVID-19 pandemic. If the toe pain worsens, the pain and swelling is spreading instead of gradually getting better, you develop body aches or fevers, or you see red streaking going from your toe up your foot towards your leg, then go to the ER to make sure there is no infection. You may stay off of your lipitor (atorvastatin) medicine for the next 1-2 weeks. Restart it after the toe feels better. For the post nasal drip, take Claritin 10 mg once daily. Also begin Flonase nasal spray, 2 sprays each nostril daily. See handout below for proper dosing and use of Flonase.  It will not work immediately, it may take 3 or so days to notice it starting to work, maximum benefit is at 2 weeks, and should be used every day. 12  Subjective:   Chryl Fabry is a 79 y.o. female who was seen for Gout (Left foot 10-15 days)    She developed red, swollen, painful left great toe about 10-15 days ago. Her brother and sister have gout, but pt has never had it before. She stopped her lipitor about 4 days ago, in case it was causing joint pain. No other painful or swollen joints. C/O PND since April causing a tickle in her throat and a dry cough. She thought was allergies, but was afraid to take any medicines. Anxiety - has been doing well during the pandemic and has not needed the buspar    Dyslipidemia - , started atorvastatin 20 mg 1/06/2020 with good tolerance  -heart CT calcium score 162 which is c/w  Moderate coronary artery disease, and carotid duplex showing less than 50% blockage on both carotids which is increased on the right from 2012  -lipid recheck was due in April but postponed due to the COVID-19 pandemic    Prior to Admission medications    Medication Sig Start Date End Date Taking? Authorizing Provider   ketorolac (ACULAR) 0.5 % ophthalmic solution INSTILL 1 DROP INTO SURGERY EVERY 4 TIMES A DAY, STARTING 2 DAYS PRIOR TO SURGERY 1/10/20  Yes Provider, Historical   ofloxacin (FLOXIN) 0.3 % ophthalmic solution INSTILL 1 DROP 4 TIMES INTO SURGERY EYE EVERY DAY, START 2 DAYS PRIOR TO SURGERY 1/10/20  Yes Provider, Historical   prednisoLONE acetate (PRED FORTE) 1 % ophthalmic suspension INSTILL 1 DROP 4 TIMES INTO SURGERY EYE EVERY DAY FOR 1 WEEK THEN TAPER AS INSTRUCTED 1/10/20  Yes Provider, Historical   aspirin (ASPIR-81 PO) Take  by mouth. Yes Provider, Historical   metoprolol succinate (TOPROL-XL) 25 mg XL tablet Take 1 Tab by mouth daily.  1/27/20  Yes Heaven Thompson PA-C   busPIRone (BUSPAR) 10 mg tablet Take 1/2 - 1 tab po q8-12 hrs prn anxiety 1/13/20  Yes Heaven Thompson PA-C   omega 2-mru-uwk-fish oil 100-160-1,000 mg cap Take  by mouth. Yes Provider, Historical   LACTOBACILLUS ACIDOPHILUS (PROBIOTIC PO) Take  by mouth. Yes Provider, Historical   cholecalciferol (Vitamin D3) (1000 Units /25 mcg) tablet Take 1,000 Units by mouth daily. Yes Provider, Historical   atorvastatin (LIPITOR) 20 mg tablet Take 1 Tab by mouth nightly. 4/1/20   Heaven Thompson PA-C   B.breve-L.acid-L.rham-S.thermo (PROBIOTIC) 3 billion cell chew Take  by mouth. Provider, Historical   food supplemt, lactose-reduced (NUTRITIONAL SHAKE) liqd Take 237 mL by mouth every Monday, Wednesday, Friday. Provider, Historical     No Known Allergies    Patient Active Problem List    Diagnosis Date Noted    Bilateral carotid artery stenosis 11/26/2019    Bruit of left carotid artery 11/26/2019    Dyslipidemia 06/19/2012    Obesity, Class I, BMI 30-34.9 06/19/2012    White coat syndrome without diagnosis of hypertension 01/01/2005     Current Outpatient Medications   Medication Sig Dispense Refill    colchicine 0.6 mg tablet Take 0.5 Tabs by mouth two (2) times a day. 30 Tab 1    ketorolac (ACULAR) 0.5 % ophthalmic solution INSTILL 1 DROP INTO SURGERY EVERY 4 TIMES A DAY, STARTING 2 DAYS PRIOR TO SURGERY      ofloxacin (FLOXIN) 0.3 % ophthalmic solution INSTILL 1 DROP 4 TIMES INTO SURGERY EYE EVERY DAY, START 2 DAYS PRIOR TO SURGERY      prednisoLONE acetate (PRED FORTE) 1 % ophthalmic suspension INSTILL 1 DROP 4 TIMES INTO SURGERY EYE EVERY DAY FOR 1 WEEK THEN TAPER AS INSTRUCTED      aspirin (ASPIR-81 PO) Take  by mouth.  metoprolol succinate (TOPROL-XL) 25 mg XL tablet Take 1 Tab by mouth daily. 90 Tab 1    busPIRone (BUSPAR) 10 mg tablet Take 1/2 - 1 tab po q8-12 hrs prn anxiety 20 Tab 0    omega 3-dha-epa-fish oil 100-160-1,000 mg cap Take  by mouth.  LACTOBACILLUS ACIDOPHILUS (PROBIOTIC PO) Take  by mouth.  cholecalciferol (Vitamin D3) (1000 Units /25 mcg) tablet Take 1,000 Units by mouth daily.       atorvastatin (LIPITOR) 20 mg tablet Take 1 Tab by mouth nightly. 90 Tab 1    B.breve-L.acid-L.rham-S.thermo (PROBIOTIC) 3 billion cell chew Take  by mouth.  food supplemt, lactose-reduced (NUTRITIONAL SHAKE) liqd Take 237 mL by mouth every Monday, Wednesday, Friday. No Known Allergies  Past Medical History:   Diagnosis Date    Dyslipidemia 6/19/2012    History of acute renal failure 06/19/2012    History of chickenpox     History of measles childhood    History of mumps childhood    Obesity, Class I, BMI 30-34.9 6/19/2012    White coat syndrome without diagnosis of hypertension 2005    exacerbated by stress     Past Surgical History:   Procedure Laterality Date    BIOPSY BREAST Right 12/06/2011    benign     COLONOSCOPY  3/29/2011         HX BREAST BIOPSY Right 2012    us bx negative     HX CATARACT REMOVAL      HX TOTAL ABDOMINAL HYSTERECTOMY  1974    with Unilateral oopherectomy, b/l salpingectomy due to ectopic pregnancy. Family History   Problem Relation Age of Onset    Heart Disease Mother     Diabetes Mother     Stroke Father 37        heavy alcohol    Liver Disease Brother         cirrhosis    Cancer Neg Hx      Social History     Tobacco Use    Smoking status: Never Smoker    Smokeless tobacco: Never Used   Substance Use Topics    Alcohol use: Yes     Comment: occassionally, 1 drink, 1-2 times a year       ROS     Per HPI    I affirm this is a Patient-Initiated Episode with a Patient who has not had a related appointment within my department in the past 7 days or scheduled within the next 24 hours.     Total Time: minutes: 21-30 minutes    Note: not billable if this call serves to triage the patient into an appointment for the relevant concern      ABNER OrtegaC

## 2020-06-17 NOTE — PROGRESS NOTES
Carmen Delgado is a 79 y.o. female    HIPAA verified by two patient identifiers. Chief Complaint   Patient presents with    Gout     Left foot 10-15 days     Hasn't taken the Lipitor since last Thursday     1. Have you been to the ER, urgent care clinic since your last visit? Hospitalized since your last visit? No    2. Have you seen or consulted any other health care providers outside of the [address] [place] [address] since your last visit? Include any pap smears or colon screening.  No

## 2020-06-17 NOTE — PATIENT INSTRUCTIONS
Take the colchicine 0.6 mg, 1/2 tablet twice a day, until the toe feels better. If you develop diarrhea, then drop down to taking the 1/2 tablet just once daily. If diarrhea continues, then stop the pill and call me. Have labs done at least 2 weeks after the toe feels better. The labs are not urgent, so go only go out to a [facility] when you feel safe to do so, due to the COVID-19 pandemic. If the toe pain worsens, the pain and swelling is spreading instead of gradually getting better, you develop body aches or fevers, or you see red streaking going from your toe up your foot towards your leg, then go to the ER to make sure there is no infection. You may stay off of your lipitor (atorvastatin) medicine for the next 1-2 weeks. Restart it after the toe feels better. For the post nasal drip, take Claritin 10 mg once daily. Also begin Flonase nasal spray, 2 sprays each nostril daily. See handout below for proper dosing and use of Flonase. It will not work immediately, it may take 3 or so days to notice it starting to work, maximum benefit is at 2 weeks, and should be used every day. Using a Nasal Steroid Spray: Care Instructions Your Care Instructions Your doctor may suggest using a corticosteroid nasal spray for your allergy symptoms or sinus problems. These sprays reduce the swelling inside the nose and sinuses. Unlike decongestant nasal sprays, steroid sprays won't lead to more swelling when you stop taking them. These sprays start working in a few days, but it may take several weeks before you get the full effect. Most side effects are minor. The most common complaint is a burning feeling in the nose right after the spray is used. Some people get nosebleeds. Follow-up care is a key part of your treatment and safety. Be sure to make and go to all appointments, and call your doctor if you are having problems.  It's also a good idea to know your test results and keep a list of the medicines you take. How can you care for yourself at home? Here are some tips for using these sprays: 
· You may need to prime the sprayer before you use it. This means spraying it into the air a few times to make sure you get the right amount of medicine. Follow the directions on the label. · Blow your nose before you spray. This will help clear out your nostrils. · Gently sniff the medicine into your nose as you spray. Don't snort, or the medicine will go all the way into your throat where it won't do much good. · Aim the nozzle straight toward the outer wall of your nostril. This will help keep the medicine from irritating the inner walls of your nose, especially your septum (the wall that separates your left and right nostrils). · Don't blow your nose for 10 minutes or so after you spray. And try not to sneeze. · Be safe with medicines. Use this medicine exactly as prescribed. Call your doctor if you think you are having a problem with your medicine. · Clean your sprayer once a week. Read the label to learn how. When should you call for help? Watch closely for changes in your health, and be sure to contact your doctor if you have any problems. Where can you learn more? Go to http://chay-caroline.info/ Enter W842 in the search box to learn more about \"Using a Nasal Steroid Spray: Care Instructions. \" Current as of: July 29, 2019               Content Version: 12.5 © 9927-4814 [employer]. Care instructions adapted under license by [employer] (which disclaims liability or warranty for this information). If you have questions about a medical condition or this instruction, always ask your healthcare professional. [place] [address] any warranty or liability for your use of this information. Begin Flonase Gout: Care Instructions Your Care Instructions Gout is a form of arthritis caused by a buildup of uric acid crystals in a joint. It causes sudden attacks of pain, swelling, redness, and stiffness, usually in one joint, especially the big toe. Gout usually comes on without a cause. But it can be brought on by drinking alcohol (especially beer) or eating seafood and red meat. Taking certain medicines, such as diuretics or aspirin, also can bring on an attack of gout. Taking your medicines as prescribed and following up with your doctor regularly can help you avoid gout attacks in the future. Follow-up care is a key part of your treatment and safety. Be sure to make and go to all appointments, and call your doctor if you are having problems. It's also a good idea to know your test results and keep a list of the medicines you take. How can you care for yourself at home? · If the joint is swollen, put ice or a cold pack on the area for 10 to 20 minutes at a time. Put a thin cloth between the ice and your skin. · Prop up the sore limb on a pillow when you ice it or anytime you sit or lie down during the next 3 days. Try to keep it above the level of your heart. This will help reduce swelling. · Rest sore joints. Avoid activities that put weight or strain on the joints for a few days. Take short rest breaks from your regular activities during the day. · Take your medicines exactly as prescribed. Call your doctor if you think you are having a problem with your medicine. · Take pain medicines exactly as directed. ? If the doctor gave you a prescription medicine for pain, take it as prescribed. ? If you are not taking a prescription pain medicine, ask your doctor if you can take an over-the-counter medicine. · Eat less seafood and red meat. · Check with your doctor before drinking alcohol. · Losing weight, if you are overweight, may help reduce attacks of gout. But do not go on a [employer]. \" Losing a lot of weight in a short amount of time can cause a gout attack. When should you call for help? Call your doctor now or seek immediate medical care if: 
· You have a fever. · The joint is so painful you cannot use it. · You have sudden, unexplained swelling, redness, warmth, or severe pain in one or more joints. Watch closely for changes in your health, and be sure to contact your doctor if: 
· You have joint pain. · Your symptoms get worse or are not improving after 2 or 3 days. Where can you learn more? Go to http://chay-caroline.info/ Enter Y892 in the search box to learn more about \"Gout: Care Instructions. \" Current as of: December 9, 2019               Content Version: 12.5 © 8263-1335 "[employer] ([employer])". Care instructions adapted under license by [employer] (which disclaims liability or warranty for this information). If you have questions about a medical condition or this instruction, always ask your healthcare professional. [place] [address] any warranty or liability for your use of this information. Purine-Restricted Diet: Care Instructions Your Care Instructions Purines are substances that are found in some foods. Your body turns purines into uric acid. High levels of uric acid can cause gout, which is a form of arthritis that causes pain and inflammation in joints. You may be able to help control the amount of uric acid in your body by limiting high-purine foods in your diet. Follow-up care is a key part of your treatment and safety. Be sure to make and go to all appointments, and call your doctor if you are having problems. It's also a good idea to know your test results and keep a list of the medicines you take. How can you care for yourself at home? · Plan your meals and snacks around foods that are low in purines and are safe for you to eat. These foods include: ? Green vegetables and tomatoes. ? Fruits. ? Whole-grain breads, rice, and cereals. ? Eggs, peanut butter, and nuts. ? Low-fat milk, cheese, and other milk products. ? Popcorn. ? Gelatin desserts, chocolate, cocoa, and cakes and sweets, in small amounts. · You can eat certain foods that are medium-high in purines, but eat them only once in a while. These foods include: 
? Legumes, such as dried beans and dried peas. You can have 1 cup cooked legumes each day. ? Asparagus, cauliflower, spinach, mushrooms, and green peas. ? Fish and seafood (other than very high-purine seafood). ? Oatmeal, wheat bran, and wheat germ. · Limit very high-purine foods, including: 
? Organ meats, such as liver, kidneys, sweetbreads, and brains. ? Meats, including dodge, beef, pork, and lamb. ? Game meats and any other meats in large amounts. ? Anchovies, sardines, herring, mackerel, and scallops. ? Gravy. ? Beer. Where can you learn more? Go to http://chay-caroline.info/ Enter F448 in the search box to learn more about \"Purine-Restricted Diet: Care Instructions. \" Current as of: August 22, 2019               Content Version: 12.5 © 1636-4786 Healthwise, Incorporated. Care instructions adapted under license by [employer] (which disclaims liability or warranty for this information). If you have questions about a medical condition or this instruction, always ask your healthcare professional. [place] [address] any warranty or liability for your use of this information.

## 2020-07-09 DIAGNOSIS — M79.675 GREAT TOE PAIN, LEFT: ICD-10-CM

## 2020-07-14 RX ORDER — COLCHICINE 0.6 MG/1
TABLET ORAL
Qty: 30 TAB | Refills: 1 | Status: SHIPPED | OUTPATIENT
Start: 2020-07-14 | End: 2021-05-13 | Stop reason: ALTCHOICE

## 2020-09-23 DIAGNOSIS — E78.5 DYSLIPIDEMIA: ICD-10-CM

## 2020-09-24 RX ORDER — ATORVASTATIN CALCIUM 20 MG/1
TABLET, FILM COATED ORAL
Qty: 90 TAB | Refills: 1 | Status: SHIPPED | OUTPATIENT
Start: 2020-09-24 | End: 2021-03-30

## 2021-03-22 ENCOUNTER — TRANSCRIBE ORDER (OUTPATIENT)
Dept: SCHEDULING | Age: 71
End: 2021-03-22

## 2021-03-22 DIAGNOSIS — Z12.31 VISIT FOR SCREENING MAMMOGRAM: Primary | ICD-10-CM

## 2021-03-26 DIAGNOSIS — I10 ESSENTIAL HYPERTENSION: ICD-10-CM

## 2021-03-26 NOTE — TELEPHONE ENCOUNTER
Last visit 06/17/2020 Virtual visit [facility]   Next appointment Nothing scheduled   Previous refill encounter(s)   01/01/2021 Toprol-XL #90    Requested Prescriptions     Pending Prescriptions Disp Refills    metoprolol succinate (TOPROL-XL) 25 mg XL tablet 90 Tab 0     Sig: Take 1 Tab by mouth daily for 90 days.

## 2021-03-30 RX ORDER — METOPROLOL SUCCINATE 25 MG/1
25 TABLET, EXTENDED RELEASE ORAL DAILY
Qty: 90 TAB | Refills: 0 | Status: SHIPPED | OUTPATIENT
Start: 2021-03-30 | End: 2021-05-13 | Stop reason: SDUPTHER

## 2021-03-30 NOTE — TELEPHONE ENCOUNTER
Please notify pt she is due for in-person visit for BP check, and hasn't had labs since 2019. Since we are all virtual for April until we close on 5/1/21, please have her call my new office now to schedule for May.

## 2021-03-31 NOTE — TELEPHONE ENCOUNTER
Called and informed pt of prescription at pharmacy and to make appointment with Vik Nix at new office in May, pt voiced understanding.

## 2021-05-13 ENCOUNTER — OFFICE VISIT (OUTPATIENT)
Dept: INTERNAL MEDICINE CLINIC | Age: 71
End: 2021-05-13
Payer: MEDICARE

## 2021-05-13 VITALS
SYSTOLIC BLOOD PRESSURE: 136 MMHG | BODY MASS INDEX: 34.74 KG/M2 | TEMPERATURE: 97.5 F | HEIGHT: 62 IN | WEIGHT: 188.8 LBS | RESPIRATION RATE: 16 BRPM | DIASTOLIC BLOOD PRESSURE: 82 MMHG | HEART RATE: 66 BPM | OXYGEN SATURATION: 98 %

## 2021-05-13 DIAGNOSIS — I65.23 BILATERAL CAROTID ARTERY STENOSIS: ICD-10-CM

## 2021-05-13 DIAGNOSIS — Z00.00 MEDICARE ANNUAL WELLNESS VISIT, SUBSEQUENT: Primary | ICD-10-CM

## 2021-05-13 DIAGNOSIS — F41.8 SITUATIONAL ANXIETY: ICD-10-CM

## 2021-05-13 DIAGNOSIS — I10 ESSENTIAL HYPERTENSION: ICD-10-CM

## 2021-05-13 DIAGNOSIS — E78.5 DYSLIPIDEMIA: ICD-10-CM

## 2021-05-13 DIAGNOSIS — M79.675 GREAT TOE PAIN, LEFT: ICD-10-CM

## 2021-05-13 PROCEDURE — G8417 CALC BMI ABV UP PARAM F/U: HCPCS | Performed by: PHYSICIAN ASSISTANT

## 2021-05-13 PROCEDURE — G8754 DIAS BP LESS 90: HCPCS | Performed by: PHYSICIAN ASSISTANT

## 2021-05-13 PROCEDURE — 3017F COLORECTAL CA SCREEN DOC REV: CPT | Performed by: PHYSICIAN ASSISTANT

## 2021-05-13 PROCEDURE — G9899 SCRN MAM PERF RSLTS DOC: HCPCS | Performed by: PHYSICIAN ASSISTANT

## 2021-05-13 PROCEDURE — G8432 DEP SCR NOT DOC, RNG: HCPCS | Performed by: PHYSICIAN ASSISTANT

## 2021-05-13 PROCEDURE — G8399 PT W/DXA RESULTS DOCUMENT: HCPCS | Performed by: PHYSICIAN ASSISTANT

## 2021-05-13 PROCEDURE — 99213 OFFICE O/P EST LOW 20 MIN: CPT | Performed by: PHYSICIAN ASSISTANT

## 2021-05-13 PROCEDURE — 1101F PT FALLS ASSESS-DOCD LE1/YR: CPT | Performed by: PHYSICIAN ASSISTANT

## 2021-05-13 PROCEDURE — G0439 PPPS, SUBSEQ VISIT: HCPCS | Performed by: PHYSICIAN ASSISTANT

## 2021-05-13 PROCEDURE — G8536 NO DOC ELDER MAL SCRN: HCPCS | Performed by: PHYSICIAN ASSISTANT

## 2021-05-13 PROCEDURE — 1090F PRES/ABSN URINE INCON ASSESS: CPT | Performed by: PHYSICIAN ASSISTANT

## 2021-05-13 PROCEDURE — G8427 DOCREV CUR MEDS BY ELIG CLIN: HCPCS | Performed by: PHYSICIAN ASSISTANT

## 2021-05-13 PROCEDURE — G8752 SYS BP LESS 140: HCPCS | Performed by: PHYSICIAN ASSISTANT

## 2021-05-13 RX ORDER — METOPROLOL SUCCINATE 25 MG/1
25 TABLET, EXTENDED RELEASE ORAL DAILY
Qty: 90 TAB | Refills: 3 | Status: SHIPPED | OUTPATIENT
Start: 2021-05-13 | End: 2022-05-19 | Stop reason: SDUPTHER

## 2021-05-13 RX ORDER — BUSPIRONE HYDROCHLORIDE 10 MG/1
TABLET ORAL
Qty: 90 TAB | Refills: 1 | Status: SHIPPED | OUTPATIENT
Start: 2021-05-13 | End: 2021-07-09

## 2021-05-13 NOTE — PROGRESS NOTES
Elzbieta Márquez is a 70 y.o. female     Chief Complaint   Patient presents with   [facility] Annual Wellness Visit     medicare wellness/cpe       Visit Vitals  /82 (BP 1 Location: Left arm, BP Patient Position: Sitting, BP Cuff Size: Adult)   Pulse 66   Temp 97.5 °F (36.4 °C) (Temporal)   Resp 16   Ht 5' 2\" (1.575 m)   Wt 188 lb 12.8 oz (85.6 kg)   LMP 01/01/1974   SpO2 98%   BMI 34.53 kg/m²       Health Maintenance Due   Topic Date Due    Shingrix Vaccine Age 49> (1 of 2) Never done    Medicare Yearly Exam  05/15/2020    Lipid Screen  11/13/2020    Colorectal Cancer Screening Combo  03/29/2021       1. Have you been to the ER, urgent care clinic since your last visit? Hospitalized since your last visit? No    2. Have you seen or consulted any other health care providers outside of the [address] [place] [address] since your last visit? Include any pap smears or colon screening. No    Patient is not fasting. Has not shingrix vaccines. Patient is due for colonoscopy next year. Patient is scheduled for Mammogram tomorrow 5/14/21. No recent pap.

## 2021-05-13 NOTE — PROGRESS NOTES
This is a Subsequent Medicare Annual Wellness Exam (AWV) (Performed 12 months after IPPE or effective date of Medicare Part B enrollment)    I have reviewed the patient's medical history in detail and updated the computerized patient record. History     Past Medical History:   Diagnosis Date    Dyslipidemia 6/19/2012    History of acute renal failure 06/19/2012    History of chickenpox     History of measles childhood    History of mumps childhood    Obesity, Class I, BMI 30-34.9 6/19/2012    White coat syndrome without diagnosis of hypertension 2005    exacerbated by stress      Past Surgical History:   Procedure Laterality Date    BIOPSY BREAST Right 12/06/2011    benign     COLONOSCOPY  3/29/2011         HX BREAST BIOPSY Right 2012    us bx negative     HX CATARACT REMOVAL      HX TOTAL ABDOMINAL HYSTERECTOMY  1974    with Unilateral oopherectomy, b/l salpingectomy due to ectopic pregnancy. Current Outpatient Medications   Medication Sig Dispense Refill    metoprolol succinate (TOPROL-XL) 25 mg XL tablet Take 1 Tab by mouth daily for 90 days. 90 Tab 0    atorvastatin (LIPITOR) 20 mg tablet TAKE 1 TABLET BY MOUTH EVERY DAY AT NIGHT 90 Tab 0    aspirin (ASPIR-81 PO) Take  by mouth.  busPIRone (BUSPAR) 10 mg tablet Take 1/2 - 1 tab po q8-12 hrs prn anxiety 20 Tab 0    cholecalciferol (Vitamin D3) (1000 Units /25 mcg) tablet Take 1,000 Units by mouth daily.       colchicine 0.6 mg tablet TAKE 1/2 TABLET BY MOUTH TWICE A DAY 30 Tab 1    ketorolac (ACULAR) 0.5 % ophthalmic solution INSTILL 1 DROP INTO SURGERY EVERY 4 TIMES A DAY, STARTING 2 DAYS PRIOR TO SURGERY      ofloxacin (FLOXIN) 0.3 % ophthalmic solution INSTILL 1 DROP 4 TIMES INTO SURGERY EYE EVERY DAY, START 2 DAYS PRIOR TO SURGERY      prednisoLONE acetate (PRED FORTE) 1 % ophthalmic suspension INSTILL 1 DROP 4 TIMES INTO SURGERY EYE EVERY DAY FOR 1 WEEK THEN TAPER AS INSTRUCTED      omega 3-dha-epa-fish oil 100-160-1,000 mg cap Take  by mouth.  B.breve-L.acid-L.rham-S.thermo (PROBIOTIC) 3 billion cell chew Take  by mouth.  food supplemt, lactose-reduced (NUTRITIONAL SHAKE) liqd Take 237 mL by mouth every Monday, Wednesday, Friday.  LACTOBACILLUS ACIDOPHILUS (PROBIOTIC PO) Take  by mouth. No Known Allergies  Family History   Problem Relation Age of Onset    Heart Disease Mother     Diabetes Mother     Stroke Father 37        heavy alcohol    Liver Disease Brother         cirrhosis    Cancer Neg Hx      Social History     Tobacco Use    Smoking status: Never Smoker    Smokeless tobacco: Never Used   Substance Use Topics    Alcohol use: Yes     Comment: occassionally, 1 drink, 1-2 times a year     Patient Active Problem List    Diagnosis    Bilateral carotid artery stenosis    Bruit of left carotid artery    Dyslipidemia    Obesity, Class I, BMI 30-34.9    White coat syndrome without diagnosis of hypertension     exacerbated by stress         Depression Risk Factor Screening:     3 most recent PHQ Screens 5/13/2021   Little interest or pleasure in doing things Several days   Feeling down, depressed, irritable, or hopeless Several days   Total Score PHQ 2 2     Alcohol Risk Factor Screening:   Do you average more than 1 drink per night or more than 7 drinks a week:  No    On any one occasion in the past three months have you have had more than 3 drinks containing alcohol:  No    Functional Ability and Level of Safety:   Hearing Loss  Hearing is good. Activities of Daily Living  The home contains: grab bars  Patient does total self care    Fall Risk  Fall Risk Assessment, last 12 mths 5/13/2021   Able to walk? Yes   Fall in past 12 months? 0   Do you feel unsteady?  0   Are you worried about falling 0       Abuse Screen  Patient is not abused    Cognitive Screening   Evaluation of Cognitive Function:  Has your family/caregiver stated any concerns about your memory: no      Patient Care Team   Patient Care Team:  Rupal Billings as PCP - General (Physician Assistant)  Joyce Ace PA-C as PCP - [address] [place], [place] [address]rebecca Machado Provider    Assessment/Plan   Education and counseling provided:  Are appropriate based on today's review and evaluation  Pneumococcal Vaccine UTD  Appropriate cancer screening tests    Declines referral for colonoscopy since she just moved and needs to \"settle down and relax: before someone probes her\", she would like to discuss again in 6 months  Declines Shingrix  She has had Covid vaccine    Diagnoses and all orders for this visit:    1. Medicare annual wellness visit, subsequent    2. Essential hypertension  -     metoprolol succinate (TOPROL-XL) 25 mg XL tablet; Take 1 Tab by mouth daily.  -     METABOLIC PANEL, COMPREHENSIVE; Future  -     CBC WITH AUTOMATED DIFF; Future    3. Situational anxiety  -     busPIRone (BUSPAR) 10 mg tablet; Take 1/2 - 1 tab po q8-12 hrs prn anxiety  -     TSH 3RD GENERATION; Future    4. Great toe pain, left  -     URIC ACID; Future    5. Bilateral carotid artery stenosis    6. Dyslipidemia  -     METABOLIC PANEL, COMPREHENSIVE; Future  -     CBC WITH AUTOMATED DIFF; Future  -     LIPID PANEL; Future        Health Maintenance Due   Topic Date Due    Shingrix Vaccine Age 49> (1 of 2) Never done    Medicare Yearly Exam  05/15/2020    Lipid Screen  11/13/2020    Colorectal Cancer Screening Combo  03/29/2021         HPI:  Ciro Epps. Haylee Franco also presents for follow up of chronic conditions. Last visit [facility] 6/17/2020, no new labs since last visit    Hypertension - on metoprolol XL 25 mg, compliant with medication, no home monitoring. No history of heart disease or stroke. No chest pain, no shortness of breath, no headaches, no lower extremity swelling.         Anxiety - had been doing well during the pandemic and had not needed the buspar over the last year, except now she has been more stressed since it took her 1 month to move, she down-sized and moved from the [facility] area to [place]  -she has used the buspar but it was over a year old  -she takes just 5 mg once daily when she feels stressed, and states it didn't seem as effective now as it did last year (we wonder if it is b/c it is , or if she is more stressed)     Dyslipidemia - , started atorvastatin 20 mg 2020 with good tolerance  -heart CT calcium score 162 which is c/w  Moderate coronary artery disease, and carotid duplex showing less than 50% blockage on both carotids which is increased on the right from 2012  -lipid recheck was due in 2020 but postponed due to the COVID-19 pandemic  -we have not checked labs since she started the atorvastatin and she forgot to fast today for labs  -she is taking ASA 81 mg \"when I think about it\"    Left great toe pain noted at last visit 2020  She did not recall an injury at the time of the visit but then remembered having stubbed it  She never took the colchicine, the toe gradually resolved spontaneously  Her brother and sister have gout, will still check uric acid with labs    Patient Active Problem List    Diagnosis    Bilateral carotid artery stenosis    Bruit of left carotid artery    Dyslipidemia    Obesity, Class I, BMI 30-34.9    White coat syndrome without diagnosis of hypertension     exacerbated by stress         See Past Medical History, Surgical History, Social History, Medications, and Allergies documented above. ROS:  ROS negative except as per HPI.     PE:  Visit Vitals  /82 (BP 1 Location: Left arm, BP Patient Position: Sitting, BP Cuff Size: Adult)   Pulse 66   Temp 97.5 °F (36.4 °C) (Temporal)   Resp 16   Ht 5' 2\" (1.575 m)   Wt 188 lb 12.8 oz (85.6 kg)   LMP 1974   SpO2 98%   BMI 34.53 kg/m²     Gen: alert, oriented, no acute distress  Head: normocephalic, atraumatic  Eyes: pupils equal round reactive to light, sclera clear, conjunctiva clear  Neck: symmetric normal sized thyroid, no carotid bruits, no jugular vein distention  Resp: no increase work of breathing, lungs clear to ausculation bilaterally, no wheezing, rales or rhonchi  CV: S1, S2 normal.  No murmurs, rubs, or gallops. Abd: soft, not tender, not distended. Normal bowel sounds. Neuro: grossly not focal  Skin: no lesion or rash  Extremities: no cyanosis or edema    No results found for this visit on 05/13/21. Lab Results   Component Value Date/Time    WBC 6.0 11/13/2019 11:51 AM    HGB 14.4 11/13/2019 11:51 AM    HCT 44.2 11/13/2019 11:51 AM    PLATELET 602 21/86/0794 11:51 AM    MCV 83 11/13/2019 11:51 AM     Lab Results   Component Value Date/Time    Sodium 140 11/13/2019 11:51 AM    Potassium 4.6 11/13/2019 11:51 AM    Chloride 100 11/13/2019 11:51 AM    CO2 26 11/13/2019 11:51 AM    Glucose 90 11/13/2019 11:51 AM    BUN 11 11/13/2019 11:51 AM    Creatinine 1.07 (H) 11/13/2019 11:51 AM    BUN/Creatinine ratio 10 (L) 11/13/2019 11:51 AM    GFR est AA 61 11/13/2019 11:51 AM    GFR est non-AA 53 (L) 11/13/2019 11:51 AM    Calcium 10.0 11/13/2019 11:51 AM    Bilirubin, total 0.5 11/13/2019 11:51 AM    Alk. phosphatase 113 11/13/2019 11:51 AM    Protein, total 7.4 11/13/2019 11:51 AM    Albumin 4.6 11/13/2019 11:51 AM    A-G Ratio 1.6 11/13/2019 11:51 AM    ALT (SGPT) 8 11/13/2019 11:51 AM    AST (SGOT) 17 11/13/2019 11:51 AM     Lab Results   Component Value Date/Time    Cholesterol, total 257 (H) 11/13/2019 11:51 AM    HDL Cholesterol 58 11/13/2019 11:51 AM    LDL, calculated 177 (H) 11/13/2019 11:51 AM    VLDL, calculated 22 11/13/2019 11:51 AM    Triglyceride 109 11/13/2019 11:51 AM     Lab Results   Component Value Date/Time    TSH 2.370 06/01/2016 10:47 AM     No results found for: HBA1C, HGBE8, HEP2RKWJ, DNP0YDFU  Lab Results   Component Value Date/Time    VITAMIN D, 25-HYDROXY 31.2 11/07/2018 12:53 PM       No results found for: URICA, UAU1      Assessment/Plan:    1. Medicare annual wellness visit, subsequent  See above    2.  Essential hypertension  136/82   on metoprolol XL 25 mg,  HTN - well controlled. Continue current medication. Exercise, and low salt/ low caffeine diet were discussed. - metoprolol succinate (TOPROL-XL) 25 mg XL tablet; Take 1 Tab by mouth daily. Dispense: 90 Tab; Refill: 3  - METABOLIC PANEL, COMPREHENSIVE; Future  - CBC WITH AUTOMATED DIFF; Future    3. Situational anxiety  She just down-sized and moved, took her over a month  Had buspar on hand from prior episode of anxiety around cataract surgery  Reviewed dosing options, refilled Rx    - busPIRone (BUSPAR) 10 mg tablet; Take 1/2 - 1 tab po q8-12 hrs prn anxiety  Dispense: 90 Tab; Refill: 1  - TSH 3RD GENERATION; Future    4. Great toe pain, left  Episode that resolved spontaneously last year  +Fam hx of gout  - URIC ACID; Future    5. Bilateral carotid artery stenosis  carotid duplex 11/2019 showing less than 50% blockage on both carotids which is increased on the right from 2012  -on statin and ASA 81 mg  -she admits to sometimes forgetting to take the ASA, I reviewed rationale for therapy and reminder to take it    6. Dyslipidemia  , started atorvastatin 20 mg 1/06/2020 with good tolerance  -heart CT calcium score 162 which is c/w  Moderate coronary artery disease,  - METABOLIC PANEL, COMPREHENSIVE; Future  - CBC WITH AUTOMATED DIFF; Future  - LIPID PANEL;  Future      Orders Placed This Encounter    URIC ACID     Standing Status:   Future     Standing Expiration Date:   5/10/2521    METABOLIC PANEL, COMPREHENSIVE     Standing Status:   Future     Standing Expiration Date:   5/13/2022    CBC WITH AUTOMATED DIFF     Standing Status:   Future     Standing Expiration Date:   5/13/2022    TSH 3RD GENERATION     Standing Status:   Future     Standing Expiration Date:   5/13/2022    LIPID PANEL     Standing Status:   Future     Standing Expiration Date:   5/13/2022    busPIRone (BUSPAR) 10 mg tablet     Sig: Take 1/2 - 1 tab po q8-12 hrs prn anxiety Dispense:  90 Tab     Refill:  1    metoprolol succinate (TOPROL-XL) 25 mg XL tablet     Sig: Take 1 Tab by mouth daily. Dispense:  90 Tab     Refill:  3       Medications Discontinued During This Encounter   Medication Reason    B.breve-L.acid-L.rham-S.thermo (PROBIOTIC) 3 billion cell chew LIST CLEANUP    colchicine 0.6 mg tablet Therapy Completed    food supplemt, lactose-reduced (NUTRITIONAL SHAKE) liqd LIST CLEANUP    ketorolac (ACULAR) 0.5 % ophthalmic solution Therapy Completed    LACTOBACILLUS ACIDOPHILUS (PROBIOTIC PO) LIST CLEANUP    ofloxacin (FLOXIN) 0.3 % ophthalmic solution Therapy Completed    omega 3-dha-epa-fish oil 100-160-1,000 mg cap Therapy Completed    prednisoLONE acetate (PRED FORTE) 1 % ophthalmic suspension Therapy Completed    busPIRone (BUSPAR) 10 mg tablet REORDER    metoprolol succinate (TOPROL-XL) 25 mg XL tablet REORDER       Current Outpatient Medications   Medication Sig Dispense Refill    busPIRone (BUSPAR) 10 mg tablet Take 1/2 - 1 tab po q8-12 hrs prn anxiety 90 Tab 1    metoprolol succinate (TOPROL-XL) 25 mg XL tablet Take 1 Tab by mouth daily. 90 Tab 3    atorvastatin (LIPITOR) 20 mg tablet TAKE 1 TABLET BY MOUTH EVERY DAY AT NIGHT 90 Tab 0    aspirin (ASPIR-81 PO) Take  by mouth.  cholecalciferol (Vitamin D3) (1000 Units /25 mcg) tablet Take 1,000 Units by mouth daily. Recommended healthy diet low in carbohydrates, fats, sodium and cholesterol. Recommended regular cardiovascular exercise 3-6 times per week for 30-60 minutes daily. Verbal and written instructions (see AVS) provided. Patient expresses understanding of diagnosis and treatment plan. Follow-up and Dispositions    · Return in about 6 months (around 11/13/2021) for anxiety, HTN; 2nd appt for fasting labs.        Future Appointments   Date Time Provider Sunny Cid   5/18/2021 10:20 AM LAB ONLY [facility]   11/18/2021 11:00 AM Heaven Thompson, PACharisseC [facility]

## 2021-05-13 NOTE — ACP (ADVANCE CARE PLANNING)
Advance Care Planning (ACP) Provider Conversation Snapshot    Date of ACP Conversation: 05/13/21  Persons included in Conversation:  patient  Length of ACP Conversation in minutes:  <16 minutes (Non-Billable)    Authorized Decision Maker (if patient is incapable of making informed decisions): This person is:   Named in Advance Directive or Healthcare Power of [employer], [address] [place] [address].  Christiano Ortiz          For Patients with Decision Making Capacity:   Values/Goals: Exploration of values, goals, and preferences if recovery is not expected, even with continued medical treatment in the event of:  Imminent death  Severe, permanent brain injury    Conversation Outcomes / Follow-Up Plan:   ACP complete - no further action today  She has a copy at home, she completed this many years ago, and will bring it in at next visit

## 2021-05-13 NOTE — PATIENT INSTRUCTIONS
Medicare Wellness Visit, Female The best way to live healthy is to have a lifestyle where you eat a well-balanced diet, exercise regularly, limit alcohol use, and quit all forms of tobacco/nicotine, if applicable. Regular preventive services are another way to keep healthy. Preventive services (vaccines, screening tests, monitoring & exams) can help personalize your care plan, which helps you manage your own care. Screening tests can find health problems at the earliest stages, when they are easiest to treat. [facility] follows the current, evidence-based guidelines published by the [facility] ([facility]) when recommending preventive services for our patients. Because we follow these guidelines, sometimes recommendations change over time as research supports it. (For example, mammograms used to be recommended annually. Even though Medicare will still pay for an annual mammogram, the newer guidelines recommend a mammogram every two years for women of average risk). Of course, you and your doctor may decide to screen more often for some diseases, based on your risk and your co-morbidities (chronic disease you are already diagnosed with). Preventive services for you include: - Medicare offers their members a free annual wellness visit, which is time for you and your primary care provider to discuss and plan for your preventive service needs. Take advantage of this benefit every year! 
-All adults over the age of 72 should receive the recommended pneumonia vaccines. Current USPSTF guidelines recommend a series of two vaccines for the best pneumonia protection.  
-All adults should have a flu vaccine yearly and a tetanus vaccine every 10 years.  
-All adults age 48 and older should receive the shingles vaccines (series of two vaccines).      
-All adults age 38-68 who are overweight should have a diabetes screening test once every three years.  
-All adults born between [birth year] and 1965 should be screened once for Hepatitis C. 
-Other screening tests and preventive services for persons with diabetes include: an eye exam to screen for diabetic retinopathy, a kidney function test, a foot exam, and stricter control over your cholesterol.  
-Cardiovascular screening for adults with routine risk involves an electrocardiogram (ECG) at intervals determined by your doctor.  
-Colorectal cancer screenings should be done for adults age 54-65 with no increased risk factors for colorectal cancer. There are a number of acceptable methods of screening for this type of cancer. Each test has its own benefits and drawbacks. Discuss with your doctor what is most appropriate for you during your annual wellness visit. The different tests include: colonoscopy (considered the best screening method), a fecal occult blood test, a fecal DNA test, and sigmoidoscopy. 
 
-A bone mass density test is recommended when a woman turns 65 to screen for osteoporosis. This test is only recommended one time, as a screening. Some providers will use this same test as a disease monitoring tool if you already have osteoporosis. -Breast cancer screenings are recommended every other year for women of normal risk, age 54-69. 
-Cervical cancer screenings for women over age 72 are only recommended with certain risk factors. Here is a list of your current Health Maintenance items (your personalized list of preventive services) with a due date: 
Health Maintenance Due Topic Date Due  Cholesterol Test   11/13/2020  Colorectal Screening  03/29/2021

## 2021-05-14 ENCOUNTER — HOSPITAL ENCOUNTER (OUTPATIENT)
Dept: MAMMOGRAPHY | Age: 71
Discharge: HOME | End: 2021-05-14
Payer: MEDICARE

## 2021-05-14 DIAGNOSIS — Z12.31 VISIT FOR SCREENING MAMMOGRAM: ICD-10-CM

## 2021-05-14 PROCEDURE — 77067 SCR MAMMO BI INCL CAD: CPT

## 2021-05-18 ENCOUNTER — APPOINTMENT (OUTPATIENT)
Dept: INTERNAL MEDICINE CLINIC | Age: 71
End: 2021-05-18

## 2021-05-18 DIAGNOSIS — F41.8 SITUATIONAL ANXIETY: ICD-10-CM

## 2021-05-18 DIAGNOSIS — E78.5 DYSLIPIDEMIA: ICD-10-CM

## 2021-05-18 DIAGNOSIS — M79.675 GREAT TOE PAIN, LEFT: ICD-10-CM

## 2021-05-18 DIAGNOSIS — I10 ESSENTIAL HYPERTENSION: ICD-10-CM

## 2021-05-18 LAB
ALBUMIN SERPL-MCNC: 4.1 G/DL (ref 3.5–5)
ALBUMIN/GLOB SERPL: 1.2 {RATIO} (ref 1.1–2.2)
ALP SERPL-CCNC: 149 U/L (ref 45–117)
ALT SERPL-CCNC: 24 U/L (ref 12–78)
ANION GAP SERPL CALC-SCNC: 3 MMOL/L (ref 5–15)
AST SERPL-CCNC: 20 U/L (ref 15–37)
BASOPHILS # BLD: 0.1 K/UL (ref 0–0.1)
BASOPHILS NFR BLD: 1 % (ref 0–1)
BILIRUB SERPL-MCNC: 0.4 MG/DL (ref 0.2–1)
BUN SERPL-MCNC: 15 MG/DL (ref 6–20)
BUN/CREAT SERPL: 13 (ref 12–20)
CALCIUM SERPL-MCNC: 9.9 MG/DL (ref 8.5–10.1)
CHLORIDE SERPL-SCNC: 109 MMOL/L (ref 97–108)
CHOLEST SERPL-MCNC: 162 MG/DL
CO2 SERPL-SCNC: 31 MMOL/L (ref 21–32)
CREAT SERPL-MCNC: 1.17 MG/DL (ref 0.55–1.02)
DIFFERENTIAL METHOD BLD: ABNORMAL
EOSINOPHIL # BLD: 0.3 K/UL (ref 0–0.4)
EOSINOPHIL NFR BLD: 5 % (ref 0–7)
ERYTHROCYTE [DISTWIDTH] IN BLOOD BY AUTOMATED COUNT: 15.2 % (ref 11.5–14.5)
GLOBULIN SER CALC-MCNC: 3.5 G/DL (ref 2–4)
GLUCOSE SERPL-MCNC: 103 MG/DL (ref 65–100)
HCT VFR BLD AUTO: 42.8 % (ref 35–47)
HDLC SERPL-MCNC: 64 MG/DL
HDLC SERPL: 2.5 {RATIO} (ref 0–5)
HGB BLD-MCNC: 14.2 G/DL (ref 11.5–16)
IMM GRANULOCYTES # BLD AUTO: 0 K/UL (ref 0–0.04)
IMM GRANULOCYTES NFR BLD AUTO: 0 % (ref 0–0.5)
LDLC SERPL CALC-MCNC: 77.6 MG/DL (ref 0–100)
LYMPHOCYTES # BLD: 2.2 K/UL (ref 0.8–3.5)
LYMPHOCYTES NFR BLD: 34 % (ref 12–49)
MCH RBC QN AUTO: 28.5 PG (ref 26–34)
MCHC RBC AUTO-ENTMCNC: 33.2 G/DL (ref 30–36.5)
MCV RBC AUTO: 85.9 FL (ref 80–99)
MONOCYTES # BLD: 0.6 K/UL (ref 0–1)
MONOCYTES NFR BLD: 9 % (ref 5–13)
NEUTS SEG # BLD: 3.3 K/UL (ref 1.8–8)
NEUTS SEG NFR BLD: 51 % (ref 32–75)
NRBC # BLD: 0 K/UL (ref 0–0.01)
NRBC BLD-RTO: 0 PER 100 WBC
PLATELET # BLD AUTO: 181 K/UL (ref 150–400)
POTASSIUM SERPL-SCNC: 6 MMOL/L (ref 3.5–5.1)
PROT SERPL-MCNC: 7.6 G/DL (ref 6.4–8.2)
RBC # BLD AUTO: 4.98 M/UL (ref 3.8–5.2)
SODIUM SERPL-SCNC: 143 MMOL/L (ref 136–145)
TRIGL SERPL-MCNC: 102 MG/DL (ref ?–150)
TSH SERPL DL<=0.05 MIU/L-ACNC: 2.89 UIU/ML (ref 0.36–3.74)
URATE SERPL-MCNC: 6.6 MG/DL (ref 2.6–6)
VLDLC SERPL CALC-MCNC: 20.4 MG/DL
WBC # BLD AUTO: 6.4 K/UL (ref 3.6–11)

## 2021-05-21 DIAGNOSIS — R74.8 ELEVATED ALKALINE PHOSPHATASE LEVEL: ICD-10-CM

## 2021-05-21 DIAGNOSIS — N18.31 STAGE 3A CHRONIC KIDNEY DISEASE (HCC): ICD-10-CM

## 2021-05-21 DIAGNOSIS — E87.5 HYPERKALEMIA: Primary | ICD-10-CM

## 2021-05-21 NOTE — PROGRESS NOTES
Correction to first note, I didn't complete a sentence   -regarding kidney function, tell her to avoid anti-inflammatories such as advil and aleve (and their equivalent generics). -I also attempted to print \"purine restricted diet\" from home, but it won't go thru for me.

## 2021-05-21 NOTE — PROGRESS NOTES
Verified two patient identifiers, name and . Patient provided with results. Patient stated they understood the information and has no questions at this time.     Patient has scheduled a lab only appointment for 21 @ 8:40 am

## 2021-05-21 NOTE — PROGRESS NOTES
Please call about lab results -   -the cholesterol looks great on the atorvastatin! LDL is now 77, and was 177! Continue current medicine to maintain this  -her potassium level was high, Is she taking any OTC supplements with potassium? We need to recheck this next week. She does NOT need to be fasting, I have placed order, see if she can come in on Monday for lab. - the kidney function is just slightly reduced, it has fluctuated to this level before, please be sure to drink 8 glasses of water every day and avoid  -one enzyme is elevated that can come from liver or bone, so I will check an additional lab to sort this out when she comes in for the potassium check  -the uric acid was just barely elevated, so I am not convinced yet that her toe problem was from gout. However, I will try to print out some info about foods to avoid in the diet and we can monitor this over time.

## 2021-05-24 ENCOUNTER — APPOINTMENT (OUTPATIENT)
Dept: INTERNAL MEDICINE CLINIC | Age: 71
End: 2021-05-24

## 2021-05-24 DIAGNOSIS — R74.8 ELEVATED ALKALINE PHOSPHATASE LEVEL: ICD-10-CM

## 2021-05-24 DIAGNOSIS — N18.31 STAGE 3A CHRONIC KIDNEY DISEASE (HCC): ICD-10-CM

## 2021-05-24 DIAGNOSIS — E87.5 HYPERKALEMIA: ICD-10-CM

## 2021-05-24 LAB
ALBUMIN SERPL-MCNC: 4 G/DL (ref 3.5–5)
ALBUMIN/GLOB SERPL: 1.2 {RATIO} (ref 1.1–2.2)
ALP SERPL-CCNC: 138 U/L (ref 45–117)
ALT SERPL-CCNC: 24 U/L (ref 12–78)
ANION GAP SERPL CALC-SCNC: 4 MMOL/L (ref 5–15)
AST SERPL-CCNC: 21 U/L (ref 15–37)
BILIRUB SERPL-MCNC: 0.6 MG/DL (ref 0.2–1)
BUN SERPL-MCNC: 17 MG/DL (ref 6–20)
BUN/CREAT SERPL: 14 (ref 12–20)
CALCIUM SERPL-MCNC: 10 MG/DL (ref 8.5–10.1)
CHLORIDE SERPL-SCNC: 111 MMOL/L (ref 97–108)
CO2 SERPL-SCNC: 29 MMOL/L (ref 21–32)
COMMENT, HOLDF: NORMAL
CREAT SERPL-MCNC: 1.2 MG/DL (ref 0.55–1.02)
GGT SERPL-CCNC: 21 U/L (ref 5–55)
GLOBULIN SER CALC-MCNC: 3.4 G/DL (ref 2–4)
GLUCOSE SERPL-MCNC: 98 MG/DL (ref 65–100)
POTASSIUM SERPL-SCNC: 5.1 MMOL/L (ref 3.5–5.1)
PROT SERPL-MCNC: 7.4 G/DL (ref 6.4–8.2)
SAMPLES BEING HELD,HOLD: NORMAL
SODIUM SERPL-SCNC: 144 MMOL/L (ref 136–145)

## 2021-05-27 LAB
ALP BONE CFR SERPL: 27 % (ref 14–68)
ALP INTEST CFR SERPL: 8 % (ref 0–18)
ALP LIVER CFR SERPL: 65 % (ref 18–85)
ALP SERPL-CCNC: 133 IU/L (ref 48–121)

## 2021-05-28 NOTE — PROGRESS NOTES
Please inform her the potassium level is normal. Continue to stay well hydrated. The alk phos enzyme  level is improved, appears to be from bone source. No further action needed at this time and will continue to monitor with her labs.

## 2021-06-07 DIAGNOSIS — E78.5 DYSLIPIDEMIA: ICD-10-CM

## 2021-06-07 NOTE — TELEPHONE ENCOUNTER
Last Refill: 05/13/2021  Last Visit: 5/13/2021   Next Visit: 11/18/2021    Requested Prescriptions     Pending Prescriptions Disp Refills    atorvastatin (LIPITOR) 20 mg tablet 90 Tablet 0

## 2021-06-09 RX ORDER — ATORVASTATIN CALCIUM 20 MG/1
TABLET, FILM COATED ORAL
Qty: 90 TABLET | Refills: 1 | Status: SHIPPED | OUTPATIENT
Start: 2021-06-09 | End: 2022-01-03

## 2021-07-09 DIAGNOSIS — F41.8 SITUATIONAL ANXIETY: ICD-10-CM

## 2021-07-09 RX ORDER — BUSPIRONE HYDROCHLORIDE 10 MG/1
TABLET ORAL
Qty: 90 TABLET | Refills: 1 | Status: SHIPPED | OUTPATIENT
Start: 2021-07-09 | End: 2021-11-18

## 2021-11-18 ENCOUNTER — OFFICE VISIT (OUTPATIENT)
Dept: INTERNAL MEDICINE CLINIC | Age: 71
End: 2021-11-18
Payer: MEDICARE

## 2021-11-18 VITALS
RESPIRATION RATE: 16 BRPM | OXYGEN SATURATION: 98 % | TEMPERATURE: 97.2 F | WEIGHT: 199.5 LBS | BODY MASS INDEX: 36.71 KG/M2 | SYSTOLIC BLOOD PRESSURE: 128 MMHG | HEIGHT: 62 IN | DIASTOLIC BLOOD PRESSURE: 74 MMHG | HEART RATE: 78 BPM

## 2021-11-18 DIAGNOSIS — N18.31 STAGE 3A CHRONIC KIDNEY DISEASE (HCC): ICD-10-CM

## 2021-11-18 DIAGNOSIS — R93.1 AGATSTON CORONARY ARTERY CALCIUM SCORE BETWEEN 100 AND 199: ICD-10-CM

## 2021-11-18 DIAGNOSIS — I10 ESSENTIAL HYPERTENSION: Primary | ICD-10-CM

## 2021-11-18 DIAGNOSIS — F41.8 SITUATIONAL ANXIETY: ICD-10-CM

## 2021-11-18 DIAGNOSIS — M79.675 GREAT TOE PAIN, LEFT: ICD-10-CM

## 2021-11-18 DIAGNOSIS — E78.5 DYSLIPIDEMIA: ICD-10-CM

## 2021-11-18 DIAGNOSIS — R09.82 POST-NASAL DRIP: ICD-10-CM

## 2021-11-18 DIAGNOSIS — I65.23 BILATERAL CAROTID ARTERY STENOSIS: ICD-10-CM

## 2021-11-18 PROCEDURE — 99214 OFFICE O/P EST MOD 30 MIN: CPT | Performed by: PHYSICIAN ASSISTANT

## 2021-11-18 PROCEDURE — 1101F PT FALLS ASSESS-DOCD LE1/YR: CPT | Performed by: PHYSICIAN ASSISTANT

## 2021-11-18 PROCEDURE — G9899 SCRN MAM PERF RSLTS DOC: HCPCS | Performed by: PHYSICIAN ASSISTANT

## 2021-11-18 PROCEDURE — 1090F PRES/ABSN URINE INCON ASSESS: CPT | Performed by: PHYSICIAN ASSISTANT

## 2021-11-18 PROCEDURE — G8399 PT W/DXA RESULTS DOCUMENT: HCPCS | Performed by: PHYSICIAN ASSISTANT

## 2021-11-18 PROCEDURE — G8754 DIAS BP LESS 90: HCPCS | Performed by: PHYSICIAN ASSISTANT

## 2021-11-18 PROCEDURE — G8427 DOCREV CUR MEDS BY ELIG CLIN: HCPCS | Performed by: PHYSICIAN ASSISTANT

## 2021-11-18 PROCEDURE — G8417 CALC BMI ABV UP PARAM F/U: HCPCS | Performed by: PHYSICIAN ASSISTANT

## 2021-11-18 PROCEDURE — 3017F COLORECTAL CA SCREEN DOC REV: CPT | Performed by: PHYSICIAN ASSISTANT

## 2021-11-18 PROCEDURE — G8536 NO DOC ELDER MAL SCRN: HCPCS | Performed by: PHYSICIAN ASSISTANT

## 2021-11-18 PROCEDURE — G8432 DEP SCR NOT DOC, RNG: HCPCS | Performed by: PHYSICIAN ASSISTANT

## 2021-11-18 PROCEDURE — G8752 SYS BP LESS 140: HCPCS | Performed by: PHYSICIAN ASSISTANT

## 2021-11-18 NOTE — PROGRESS NOTES
HPI:  70 y.o.  presents for follow up appointment. No hospital, ER or specialist visits since last primary care visit except as noted below. Hypertension - on metoprolol XL 25 mg, compliant with medication, no home monitoring. No history of heart disease or stroke. No chest pain, no shortness of breath, no headaches, no lower extremity swelling.    eGFR 54      Dyslipidemia - , started atorvastatin 20 mg 1/06/2020 with good tolerance   -heart CT calcium score 162 (11/26/2019) which is c/w  Moderate coronary artery disease   -carotid duplex (11/26/2019) showing less than 50% blockage on both carotids which is increased on the right from 2012  -first lipid recheck after starting atorvastatin (delayed during pandemic) was achieved on 5/18/2021 showing LDL 77  -she is taking ASA 81 mg \"when I think about it\"      Anxiety - has been doing well without the buspar    Taking a centrum silver MVI once daily but it upsets her stomach     Left Great toe pain  Had an episode suspicious for gout and Uric acid was 6.6 in 5/2021  No gout flare or foot/toe pain since last visit  She has been eating plenty of fresh fruits and veges  She is following purine restricted diet from handout I sent her as well    C/O post nasal drip when the seasons change      Patient Active Problem List    Diagnosis    Bilateral carotid artery stenosis    Bruit of left carotid artery    Dyslipidemia    Obesity, Class I, BMI 30-34.9    White coat syndrome without diagnosis of hypertension     exacerbated by stress           Past Medical History:   Diagnosis Date    Dyslipidemia 6/19/2012    History of acute renal failure 06/19/2012    History of chickenpox     History of measles childhood    History of mumps childhood    Obesity, Class I, BMI 30-34.9 6/19/2012    White coat syndrome without diagnosis of hypertension 2005    exacerbated by stress       Social History     Tobacco Use    Smoking status: Never Smoker    Smokeless tobacco: Never Used   Vaping Use    Vaping Use: Never used   Substance Use Topics    Alcohol use: Yes     Comment: occassionally, 1 drink, 1-2 times a year    Drug use: No       Outpatient Medications Marked as Taking for the 11/18/21 encounter (Office Visit) with Heaven Thompson PA-C   Medication Sig Dispense Refill    busPIRone (BUSPAR) 10 mg tablet TAKE 1/2-1 TABLET BY MOUTH ONCE EVERY 8-12 HRS AS NEEDED FOR ANXIETY 90 Tablet 1    atorvastatin (LIPITOR) 20 mg tablet TAKE 1 TABLET BY MOUTH EVERY DAY AT NIGHT 90 Tablet 1    metoprolol succinate (TOPROL-XL) 25 mg XL tablet Take 1 Tab by mouth daily. 90 Tab 3    cholecalciferol (Vitamin D3) (1000 Units /25 mcg) tablet Take 1,000 Units by mouth daily. No Known Allergies    ROS:  ROS negative except as per HPI. PE:  Visit Vitals  /74 (BP 1 Location: Left arm, BP Patient Position: Sitting, BP Cuff Size: Adult)   Pulse 78   Temp 97.2 °F (36.2 °C) (Temporal)   Resp 16   Ht 5' 2\" (1.575 m)   Wt 199 lb 8 oz (90.5 kg)   LMP 01/01/1974   SpO2 98%   BMI 36.49 kg/m²     Gen: alert, oriented, no acute distress  Head: normocephalic, atraumatic  Eyes: pupils equal round reactive to light, sclera clear, conjunctiva clear  Oral: masked  Neck: symmetric normal sized thyroid, (+)possible left carotid bruit, no jugular vein distention  Resp: no increase work of breathing, lungs clear to ausculation bilaterally, no wheezing, rales or rhonchi  CV: S1, S2 normal.  No murmurs, rubs, or gallops. Abd: soft, not tender, not distended. Neuro: grossly intact  Skin: no lesion or rash  Extremities: no cyanosis or edema    No results found for this visit on 11/18/21.     Results for orders placed or performed in visit on 05/24/21   GGT   Result Value Ref Range    GGT 21 5 - 55 U/L   METABOLIC PANEL, COMPREHENSIVE   Result Value Ref Range    Sodium 144 136 - 145 mmol/L    Potassium 5.1 3.5 - 5.1 mmol/L    Chloride 111 (H) 97 - 108 mmol/L    CO2 29 21 - 32 mmol/L Anion gap 4 (L) 5 - 15 mmol/L    Glucose 98 65 - 100 mg/dL    BUN 17 6 - 20 MG/DL    Creatinine 1.20 (H) 0.55 - 1.02 MG/DL    BUN/Creatinine ratio 14 12 - 20      GFR est AA 54 (L) >60 ml/min/1.73m2    GFR est non-AA 44 (L) >60 ml/min/1.73m2    Calcium 10.0 8.5 - 10.1 MG/DL    Bilirubin, total 0.6 0.2 - 1.0 MG/DL    ALT (SGPT) 24 12 - 78 U/L    AST (SGOT) 21 15 - 37 U/L    Alk. phosphatase 138 (H) 45 - 117 U/L    Protein, total 7.4 6.4 - 8.2 g/dL    Albumin 4.0 3.5 - 5.0 g/dL    Globulin 3.4 2.0 - 4.0 g/dL    A-G Ratio 1.2 1.1 - 2.2     ALK PHOS ISOENZYMES   Result Value Ref Range    Alkaline phosphatase 133 (H) 48 - 121 IU/L    Liver fraction 65 18 - 85 %    Bone fraction 27 14 - 68 %    Intestinal fraction 8 0 - 18 %   SAMPLES BEING HELD   Result Value Ref Range    SAMPLES BEING HELD 1sst     COMMENT        Add-on orders for these samples will be processed based on acceptable specimen integrity and analyte stability, which may vary by analyte. Lab Results   Component Value Date/Time    WBC 6.4 05/18/2021 10:40 AM    HGB 14.2 05/18/2021 10:40 AM    HCT 42.8 05/18/2021 10:40 AM    PLATELET 836 47/27/2068 10:40 AM    MCV 85.9 05/18/2021 10:40 AM     Lab Results   Component Value Date/Time    TSH 2.89 05/18/2021 10:40 AM     Lab Results   Component Value Date/Time    Cholesterol, total 162 05/18/2021 10:40 AM    HDL Cholesterol 64 05/18/2021 10:40 AM    LDL, calculated 77.6 05/18/2021 10:40 AM    VLDL, calculated 20.4 05/18/2021 10:40 AM    Triglyceride 102 05/18/2021 10:40 AM    CHOL/HDL Ratio 2.5 05/18/2021 10:40 AM         Assessment/Plan:      ICD-10-CM ICD-9-CM    1. Essential hypertension  I10 401.9 CBC WITH AUTOMATED DIFF      METABOLIC PANEL, COMPREHENSIVE      TSH 3RD GENERATION      URINALYSIS W/ REFLEX CULTURE   2. Stage 3a chronic kidney disease (HCC)  N18.31 585.3    3. Dyslipidemia  E78.5 272.4 LIPID PANEL   4. Agatston coronary artery calcium score between 100 and 199  R93.1 793.2    5.  Bilateral carotid artery stenosis  I65.23 433.10 DUPLEX CAROTID BILATERAL     433.30 LIPID PANEL   6. Situational anxiety  F41.8 300.09 TSH 3RD GENERATION   7. Great toe pain, left  M79.675 729.5 URIC ACID   8. Post-nasal drip  R09.82 784.91        HTN - well controlled. Continue current medication. Exercise, and low salt/ low caffeine diet were discussed. Reviewed drinking plenty of water daily to stay hydrated, 6-8 glasses    Tolerating atorvastatin 20 mg  Goal LDL <70 due to B carotid stenosis and heart CT calcium score 169  LDL 77, continue current atorvastatin and follow low fat diet    Monitor carotid doppler, last study 11/2019, will recheck since it has been 2 yrs and is now on statin    Anxiety doing well without buspar    No flares of toe pain  Continue to follow low purine diet, will monitor uric acid    Upset stomach with MVI, advised may take 1/2 tab daily to complete her bottle, and then may switch to children's MVI    Use flonase seasonally for post nasal drip    Health Maintenance reviewed - updated.   Declines flu shot    Orders Placed This Encounter    CBC WITH AUTOMATED DIFF     Standing Status:   Future     Standing Expiration Date:   10/86/1287    METABOLIC PANEL, COMPREHENSIVE     Standing Status:   Future     Standing Expiration Date:   11/18/2022    TSH 3RD GENERATION     Standing Status:   Future     Standing Expiration Date:   11/18/2022    LIPID PANEL     Standing Status:   Future     Standing Expiration Date:   11/18/2022    URIC ACID     Standing Status:   Future     Standing Expiration Date:   11/18/2022    URINALYSIS W/ REFLEX CULTURE     Standing Status:   Future     Standing Expiration Date:   11/18/2022       Medications Discontinued During This Encounter   Medication Reason    busPIRone (BUSPAR) 10 mg tablet Not A Current Medication       Current Outpatient Medications   Medication Sig Dispense Refill    atorvastatin (LIPITOR) 20 mg tablet TAKE 1 TABLET BY MOUTH EVERY DAY AT NIGHT 90 Tablet 1    metoprolol succinate (TOPROL-XL) 25 mg XL tablet Take 1 Tab by mouth daily. 90 Tab 3    cholecalciferol (Vitamin D3) (1000 Units /25 mcg) tablet Take 1,000 Units by mouth daily.  aspirin (ASPIR-81 PO) Take  by mouth. (Patient not taking: Reported on 11/18/2021)         Recommended healthy diet low in carbohydrates, fats, sodium and cholesterol. Recommended regular cardiovascular exercise 3-6 times per week for 30-60 minutes daily. Verbal and written instructions (see AVS) provided. Patient expresses understanding of diagnosis and treatment plan. Follow-up and Dispositions    · Return in about 6 months (around 5/18/2022) for HTN, fasting lab visit 1 week prior. No future appointments.

## 2021-11-18 NOTE — PROGRESS NOTES
Dania Aguirre is a 70 y.o. female     Chief Complaint   Patient presents with    Anxiety     6M follow up    Hypertension     6M follow up       Visit Vitals  /74 (BP 1 Location: Left arm, BP Patient Position: Sitting, BP Cuff Size: Adult)   Pulse 78   Temp 97.2 °F (36.2 °C) (Temporal)   Resp 16   Ht 5' 2\" (1.575 m)   Wt 199 lb 8 oz (90.5 kg)   LMP 01/01/1974   SpO2 98%   BMI 36.49 kg/m²       Health Maintenance Due   Topic Date Due    Shingrix Vaccine Age 49> (1 of 2) Never done    Colorectal Cancer Screening Combo  03/29/2021    Flu Vaccine (1) Never done       1. Have you been to the ER, urgent care clinic since your last visit? Hospitalized since your last visit? No     2. Have you seen or consulted any other health care providers outside of the [address] [place] [address] since your last visit? Include any pap smears or colon screening. No     Patient declined flu vaccine. No recent colonoscopy.

## 2021-11-18 NOTE — PATIENT INSTRUCTIONS
High Cholesterol: Care Instructions  Overview     Cholesterol is a type of fat in your blood. It is needed for many body functions, such as making new cells. Cholesterol is made by your body. It also comes from food you eat. High cholesterol means that you have too much of the fat in your blood. This raises your risk of a heart attack and stroke. LDL and HDL are part of your total cholesterol. LDL is the \"bad\" cholesterol. High LDL can raise your risk for coronary artery disease, heart attack, and stroke. HDL is the \"good\" cholesterol. It helps clear bad cholesterol from the body. High HDL is linked with a lower risk of coronary artery disease, heart attack, and stroke. Your cholesterol levels help your doctor find out your risk for having a heart attack or stroke. You and your doctor can talk about whether you need to lower your risk and what treatment is best for you. Treatment options include a heart-healthy lifestyle and medicine. Both options can help lower your cholesterol and your risk. The way you choose to lower your risk will depend on how high your risk is for heart attack and stroke. It will also depend on how you feel about taking medicines. Follow-up care is a key part of your treatment and safety. Be sure to make and go to all appointments, and call your doctor if you are having problems. It's also a good idea to know your test results and keep a list of the medicines you take. How can you care for yourself at home? · Eat heart-healthy foods. ? Eat fruits, vegetables, whole grains, beans, and other high-fiber foods. ? Eat lean proteins, such as seafood, lean meats, beans, nuts, and soy products. ? Eat healthy fats, such as canola and olive oil. ? Choose foods that are low in saturated fat. ? Limit sodium and alcohol. ? Limit drinks and foods with added sugar. · Be physically active. Try to do moderate activity at least 2½ hours a week.  Or try to do vigorous activity at least 1¼ hours a week. You may want to walk or try other activities, such as running, swimming, cycling, or playing tennis or team sports. · Stay at a healthy weight or lose weight by making the changes in eating and physical activity listed above. Losing just a small amount of weight, even 5 to 10 pounds, can help reduce your risk for having a heart attack or stroke. · Do not smoke. · Manage other health problems. These include diabetes and high blood pressure. If you think you may have a problem with alcohol or drug use, talk to your doctor. · If you take medicine, take it exactly as prescribed. Call your doctor if you think you are having a problem with your medicine. · Check with your doctor or pharmacist before you use any other medicines, including over-the-counter medicines. Make sure your doctor knows all of the medicines, vitamins, herbal products, and supplements you take. Taking some medicines together can cause problems. When should you call for help? Watch closely for changes in your health, and be sure to contact your doctor if:    · You need help making lifestyle changes.     · You have questions about your medicine. Where can you learn more? Go to http://www.gray.com/  Enter M185 in the search box to learn more about \"High Cholesterol: Care Instructions. \"  Current as of: April 29, 2021               Content Version: 13.0  © 2006-2021 Healthwise[employer]. Care instructions adapted under license by [employer] (which disclaims liability or warranty for this information). If you have questions about a medical condition or this instruction, always ask your healthcare professional. [place] [address] any warranty or liability for your use of this information. Using a Nasal Steroid Spray: Care Instructions  Your Care Instructions     Your doctor may suggest using a corticosteroid nasal spray for your allergy symptoms or sinus problems.   These sprays reduce the swelling inside the nose and sinuses. Unlike decongestant nasal sprays, steroid sprays won't lead to more swelling when you stop taking them. These sprays start working in a few days, but it may take several weeks before you get the full effect. Most side effects are minor. The most common complaint is a burning feeling in the nose right after the spray is used. Some people get nosebleeds. Follow-up care is a key part of your treatment and safety. Be sure to make and go to all appointments, and call your doctor if you are having problems. It's also a good idea to know your test results and keep a list of the medicines you take. How can you care for yourself at home? Here are some tips for using these sprays:  · You may need to prime the sprayer before you use it. This means spraying it into the air a few times to make sure you get the right amount of medicine. Follow the directions on the label. · Blow your nose before you spray. This will help clear out your nostrils. · Gently sniff the medicine into your nose as you spray. Don't snort, or the medicine will go all the way into your throat where it won't do much good. · Aim the nozzle straight toward the outer wall of your nostril. This will help keep the medicine from irritating the inner walls of your nose, especially your septum (the wall that separates your left and right nostrils). · Don't blow your nose for 10 minutes or so after you spray. And try not to sneeze. · Be safe with medicines. Use this medicine exactly as prescribed. Call your doctor if you think you are having a problem with your medicine. · Clean your sprayer once a week. Read the label to learn how. When should you call for help? Watch closely for changes in your health, and be sure to contact your doctor if you have any problems. Where can you learn more?   Go to http://www.gray.com/  Enter A054 in the search box to learn more about \"Using a Nasal Steroid Spray: Care Instructions. \"  Current as of: December 2, 2020               Content Version: 13.0  © 7081-9839 Healthwise, [facility]. Care instructions adapted under license by [employer] (which disclaims liability or warranty for this information). If you have questions about a medical condition or this instruction, always ask your healthcare professional. [address] any warranty or liability for your use of this information.

## 2021-11-30 ENCOUNTER — HOSPITAL ENCOUNTER (OUTPATIENT)
Dept: VASCULAR SURGERY | Age: 71
Discharge: HOME | End: 2021-11-30
Payer: MEDICARE

## 2021-11-30 DIAGNOSIS — I65.23 BILATERAL CAROTID ARTERY STENOSIS: ICD-10-CM

## 2021-11-30 PROCEDURE — 93880 EXTRACRANIAL BILAT STUDY: CPT

## 2021-12-29 DIAGNOSIS — E78.5 DYSLIPIDEMIA: ICD-10-CM

## 2022-01-03 RX ORDER — ATORVASTATIN CALCIUM 20 MG/1
TABLET, FILM COATED ORAL
Qty: 90 TABLET | Refills: 1 | Status: SHIPPED | OUTPATIENT
Start: 2022-01-03 | End: 2022-10-04

## 2022-03-19 PROBLEM — I10 ESSENTIAL HYPERTENSION: Status: ACTIVE | Noted: 2021-11-18

## 2022-03-19 PROBLEM — I65.23 BILATERAL CAROTID ARTERY STENOSIS: Status: ACTIVE | Noted: 2019-11-26

## 2022-03-19 PROBLEM — F41.8 SITUATIONAL ANXIETY: Status: ACTIVE | Noted: 2021-11-18

## 2022-03-19 PROBLEM — R09.89 BRUIT OF LEFT CAROTID ARTERY: Status: ACTIVE | Noted: 2019-11-26

## 2022-03-19 PROBLEM — M79.675 GREAT TOE PAIN, LEFT: Status: ACTIVE | Noted: 2021-11-18

## 2022-03-20 PROBLEM — N18.31 STAGE 3A CHRONIC KIDNEY DISEASE (HCC): Status: ACTIVE | Noted: 2021-11-18

## 2022-03-20 PROBLEM — R93.1 AGATSTON CORONARY ARTERY CALCIUM SCORE BETWEEN 100 AND 199: Status: ACTIVE | Noted: 2021-11-18

## 2022-04-21 ENCOUNTER — TRANSCRIBE ORDER (OUTPATIENT)
Dept: SCHEDULING | Age: 72
End: 2022-04-21

## 2022-04-21 DIAGNOSIS — Z12.31 VISIT FOR SCREENING MAMMOGRAM: Primary | ICD-10-CM

## 2022-05-11 ENCOUNTER — APPOINTMENT (OUTPATIENT)
Dept: INTERNAL MEDICINE CLINIC | Age: 72
End: 2022-05-11

## 2022-05-11 DIAGNOSIS — E78.5 DYSLIPIDEMIA: ICD-10-CM

## 2022-05-11 DIAGNOSIS — F41.8 SITUATIONAL ANXIETY: ICD-10-CM

## 2022-05-11 DIAGNOSIS — M79.675 GREAT TOE PAIN, LEFT: ICD-10-CM

## 2022-05-11 DIAGNOSIS — I10 ESSENTIAL HYPERTENSION: ICD-10-CM

## 2022-05-11 DIAGNOSIS — I65.23 BILATERAL CAROTID ARTERY STENOSIS: ICD-10-CM

## 2022-05-11 LAB
ALBUMIN SERPL-MCNC: 4 G/DL (ref 3.5–5)
ALBUMIN/GLOB SERPL: 1.1 {RATIO} (ref 1.1–2.2)
ALP SERPL-CCNC: 138 U/L (ref 45–117)
ALT SERPL-CCNC: 16 U/L (ref 12–78)
ANION GAP SERPL CALC-SCNC: 6 MMOL/L (ref 5–15)
APPEARANCE UR: CLEAR
AST SERPL-CCNC: 21 U/L (ref 15–37)
BACTERIA URNS QL MICRO: NEGATIVE /HPF
BASOPHILS # BLD: 0 K/UL (ref 0–0.1)
BASOPHILS NFR BLD: 1 % (ref 0–1)
BILIRUB SERPL-MCNC: 0.6 MG/DL (ref 0.2–1)
BILIRUB UR QL: NEGATIVE
BUN SERPL-MCNC: 14 MG/DL (ref 6–20)
BUN/CREAT SERPL: 13 (ref 12–20)
CALCIUM SERPL-MCNC: 9.8 MG/DL (ref 8.5–10.1)
CHLORIDE SERPL-SCNC: 107 MMOL/L (ref 97–108)
CHOLEST SERPL-MCNC: 161 MG/DL
CO2 SERPL-SCNC: 27 MMOL/L (ref 21–32)
COLOR UR: NORMAL
CREAT SERPL-MCNC: 1.07 MG/DL (ref 0.55–1.02)
DIFFERENTIAL METHOD BLD: ABNORMAL
EOSINOPHIL # BLD: 0.1 K/UL (ref 0–0.4)
EOSINOPHIL NFR BLD: 2 % (ref 0–7)
EPITH CASTS URNS QL MICRO: NORMAL /LPF
ERYTHROCYTE [DISTWIDTH] IN BLOOD BY AUTOMATED COUNT: 15.5 % (ref 11.5–14.5)
GLOBULIN SER CALC-MCNC: 3.5 G/DL (ref 2–4)
GLUCOSE SERPL-MCNC: 105 MG/DL (ref 65–100)
GLUCOSE UR STRIP.AUTO-MCNC: NEGATIVE MG/DL
HCT VFR BLD AUTO: 43.9 % (ref 35–47)
HDLC SERPL-MCNC: 60 MG/DL
HDLC SERPL: 2.7 {RATIO} (ref 0–5)
HGB BLD-MCNC: 14 G/DL (ref 11.5–16)
HGB UR QL STRIP: NEGATIVE
HYALINE CASTS URNS QL MICRO: NORMAL /LPF (ref 0–5)
IMM GRANULOCYTES # BLD AUTO: 0 K/UL (ref 0–0.04)
IMM GRANULOCYTES NFR BLD AUTO: 0 % (ref 0–0.5)
KETONES UR QL STRIP.AUTO: NEGATIVE MG/DL
LDLC SERPL CALC-MCNC: 82 MG/DL (ref 0–100)
LEUKOCYTE ESTERASE UR QL STRIP.AUTO: NEGATIVE
LYMPHOCYTES # BLD: 2.3 K/UL (ref 0.8–3.5)
LYMPHOCYTES NFR BLD: 41 % (ref 12–49)
MCH RBC QN AUTO: 27.2 PG (ref 26–34)
MCHC RBC AUTO-ENTMCNC: 31.9 G/DL (ref 30–36.5)
MCV RBC AUTO: 85.2 FL (ref 80–99)
MONOCYTES # BLD: 0.5 K/UL (ref 0–1)
MONOCYTES NFR BLD: 9 % (ref 5–13)
NEUTS SEG # BLD: 2.7 K/UL (ref 1.8–8)
NEUTS SEG NFR BLD: 47 % (ref 32–75)
NITRITE UR QL STRIP.AUTO: NEGATIVE
NRBC # BLD: 0 K/UL (ref 0–0.01)
NRBC BLD-RTO: 0 PER 100 WBC
PH UR STRIP: 6.5 [PH] (ref 5–8)
PLATELET # BLD AUTO: 186 K/UL (ref 150–400)
PMV BLD AUTO: 12.9 FL (ref 8.9–12.9)
POTASSIUM SERPL-SCNC: 4.8 MMOL/L (ref 3.5–5.1)
PROT SERPL-MCNC: 7.5 G/DL (ref 6.4–8.2)
PROT UR STRIP-MCNC: NEGATIVE MG/DL
RBC # BLD AUTO: 5.15 M/UL (ref 3.8–5.2)
RBC #/AREA URNS HPF: NORMAL /HPF (ref 0–5)
SODIUM SERPL-SCNC: 140 MMOL/L (ref 136–145)
SP GR UR REFRACTOMETRY: 1.01 (ref 1–1.03)
TRIGL SERPL-MCNC: 95 MG/DL (ref ?–150)
TSH SERPL DL<=0.05 MIU/L-ACNC: 2.49 UIU/ML (ref 0.36–3.74)
UA: UC IF INDICATED,UAUC: NORMAL
URATE SERPL-MCNC: 7.1 MG/DL (ref 2.6–6)
UROBILINOGEN UR QL STRIP.AUTO: 0.2 EU/DL (ref 0.2–1)
VLDLC SERPL CALC-MCNC: 19 MG/DL
WBC # BLD AUTO: 5.7 K/UL (ref 3.6–11)
WBC URNS QL MICRO: NORMAL /HPF (ref 0–4)

## 2022-05-17 ENCOUNTER — HOSPITAL ENCOUNTER (OUTPATIENT)
Dept: MAMMOGRAPHY | Age: 72
Discharge: HOME | End: 2022-05-17
Payer: MEDICARE

## 2022-05-17 DIAGNOSIS — Z12.31 VISIT FOR SCREENING MAMMOGRAM: ICD-10-CM

## 2022-05-17 PROCEDURE — 77067 SCR MAMMO BI INCL CAD: CPT

## 2022-05-19 ENCOUNTER — OFFICE VISIT (OUTPATIENT)
Dept: INTERNAL MEDICINE CLINIC | Age: 72
End: 2022-05-19
Payer: MEDICARE

## 2022-05-19 VITALS
BODY MASS INDEX: 36.75 KG/M2 | SYSTOLIC BLOOD PRESSURE: 144 MMHG | HEIGHT: 62 IN | DIASTOLIC BLOOD PRESSURE: 94 MMHG | HEART RATE: 85 BPM | OXYGEN SATURATION: 98 % | TEMPERATURE: 97.8 F | RESPIRATION RATE: 16 BRPM | WEIGHT: 199.7 LBS

## 2022-05-19 DIAGNOSIS — Z12.11 COLON CANCER SCREENING: ICD-10-CM

## 2022-05-19 DIAGNOSIS — I10 ESSENTIAL HYPERTENSION: ICD-10-CM

## 2022-05-19 DIAGNOSIS — E66.01 SEVERE OBESITY (BMI 35.0-39.9) WITH COMORBIDITY (HCC): ICD-10-CM

## 2022-05-19 DIAGNOSIS — E78.5 DYSLIPIDEMIA: ICD-10-CM

## 2022-05-19 DIAGNOSIS — F41.8 SITUATIONAL ANXIETY: ICD-10-CM

## 2022-05-19 DIAGNOSIS — N18.31 STAGE 3A CHRONIC KIDNEY DISEASE (HCC): ICD-10-CM

## 2022-05-19 DIAGNOSIS — Z00.00 MEDICARE ANNUAL WELLNESS VISIT, SUBSEQUENT: Primary | ICD-10-CM

## 2022-05-19 DIAGNOSIS — I65.23 BILATERAL CAROTID ARTERY STENOSIS: ICD-10-CM

## 2022-05-19 DIAGNOSIS — R73.9 HYPERGLYCEMIA: ICD-10-CM

## 2022-05-19 DIAGNOSIS — Z23 NEED FOR SHINGLES VACCINE: ICD-10-CM

## 2022-05-19 DIAGNOSIS — E79.0 HYPERURICEMIA: ICD-10-CM

## 2022-05-19 PROBLEM — N18.30 CHRONIC RENAL DISEASE, STAGE III (HCC): Status: ACTIVE | Noted: 2022-05-19

## 2022-05-19 PROCEDURE — G8417 CALC BMI ABV UP PARAM F/U: HCPCS | Performed by: PHYSICIAN ASSISTANT

## 2022-05-19 PROCEDURE — 1090F PRES/ABSN URINE INCON ASSESS: CPT | Performed by: PHYSICIAN ASSISTANT

## 2022-05-19 PROCEDURE — G8536 NO DOC ELDER MAL SCRN: HCPCS | Performed by: PHYSICIAN ASSISTANT

## 2022-05-19 PROCEDURE — G9899 SCRN MAM PERF RSLTS DOC: HCPCS | Performed by: PHYSICIAN ASSISTANT

## 2022-05-19 PROCEDURE — G8755 DIAS BP > OR = 90: HCPCS | Performed by: PHYSICIAN ASSISTANT

## 2022-05-19 PROCEDURE — G8432 DEP SCR NOT DOC, RNG: HCPCS | Performed by: PHYSICIAN ASSISTANT

## 2022-05-19 PROCEDURE — 3017F COLORECTAL CA SCREEN DOC REV: CPT | Performed by: PHYSICIAN ASSISTANT

## 2022-05-19 PROCEDURE — 99213 OFFICE O/P EST LOW 20 MIN: CPT | Performed by: PHYSICIAN ASSISTANT

## 2022-05-19 PROCEDURE — 1101F PT FALLS ASSESS-DOCD LE1/YR: CPT | Performed by: PHYSICIAN ASSISTANT

## 2022-05-19 PROCEDURE — G0439 PPPS, SUBSEQ VISIT: HCPCS | Performed by: PHYSICIAN ASSISTANT

## 2022-05-19 PROCEDURE — G8753 SYS BP > OR = 140: HCPCS | Performed by: PHYSICIAN ASSISTANT

## 2022-05-19 PROCEDURE — G8427 DOCREV CUR MEDS BY ELIG CLIN: HCPCS | Performed by: PHYSICIAN ASSISTANT

## 2022-05-19 PROCEDURE — G8399 PT W/DXA RESULTS DOCUMENT: HCPCS | Performed by: PHYSICIAN ASSISTANT

## 2022-05-19 RX ORDER — ZOSTER VACCINE RECOMBINANT, ADJUVANTED 50 MCG/0.5
KIT INTRAMUSCULAR
Qty: 0.5 ML | Refills: 1 | Status: SHIPPED | OUTPATIENT
Start: 2022-05-19

## 2022-05-19 RX ORDER — METOPROLOL SUCCINATE 25 MG/1
25 TABLET, EXTENDED RELEASE ORAL DAILY
Qty: 90 TABLET | Refills: 3 | Status: SHIPPED | OUTPATIENT
Start: 2022-05-19

## 2022-05-19 NOTE — PROGRESS NOTES
Florencia Pope is a 67 y.o. female     Chief Complaint   Patient presents with    Hypertension     6M follow up   [employer] Visit     medicare wellness       Visit Vitals  BP (!) 142/94 (BP 1 Location: Left arm, BP Patient Position: Sitting, BP Cuff Size: Adult)   Pulse 85   Temp 97.8 °F (36.6 °C) (Temporal)   Resp 16   Ht 5' 2\" (1.575 m)   Wt 199 lb 11.2 oz (90.6 kg)   LMP 01/01/1974   SpO2 98%   BMI 36.53 kg/m²       Health Maintenance Due   Topic Date Due    Shingrix Vaccine Age 49> (1 of 2) Never done    Colorectal Cancer Screening Combo  03/29/2021    Medicare Yearly Exam  05/14/2022         1. \"Have you been to the ER, urgent care clinic since your last visit? Hospitalized since your last visit? \" No    2. \"Have you seen or consulted any other health care providers outside of the [address] [place] [address] since your last visit? \" No     3. For patients aged 39-70: Has the patient had a colonoscopy / FIT/ Cologuard? No      If the patient is female:    4. For patients aged 41-77: Has the patient had a mammogram within the past 2 years? Yes - no Care Gap present      5. For patients aged 21-65: Has the patient had a pap smear?  NA - based on age or sex

## 2022-05-19 NOTE — PATIENT INSTRUCTIONS

## 2022-05-19 NOTE — PROGRESS NOTES
This is a Subsequent Medicare Annual Wellness Exam (AWV) (Performed 12 months after IPPE or effective date of Medicare Part B enrollment)    I have reviewed the patient's medical history in detail and updated the computerized patient record. History     Past Medical History:   Diagnosis Date    Dyslipidemia 6/19/2012    History of acute renal failure 06/19/2012    History of chickenpox     History of measles childhood    History of mumps childhood    Obesity, Class I, BMI 30-34.9 6/19/2012    White coat syndrome without diagnosis of hypertension 2005    exacerbated by stress      Past Surgical History:   Procedure Laterality Date    BIOPSY BREAST Right 12/06/2011    benign     COLONOSCOPY  3/29/2011         HX BREAST BIOPSY Right 2012    us bx negative     HX CATARACT REMOVAL      HX TOTAL ABDOMINAL HYSTERECTOMY  1974    with Unilateral oopherectomy, b/l salpingectomy due to ectopic pregnancy. Current Outpatient Medications   Medication Sig Dispense Refill    atorvastatin (LIPITOR) 20 mg tablet TAKE 1 TABLET BY MOUTH EVERY DAY AT NIGHT 90 Tablet 1    metoprolol succinate (TOPROL-XL) 25 mg XL tablet Take 1 Tab by mouth daily. 90 Tab 3    cholecalciferol (Vitamin D3) (1000 Units /25 mcg) tablet Take 1,000 Units by mouth daily.  aspirin (ASPIR-81 PO) Take  by mouth.  (Patient not taking: Reported on 11/18/2021)       No Known Allergies  Family History   Problem Relation Age of Onset    Heart Disease Mother     Diabetes Mother     Stroke Father 37        heavy alcohol    Liver Disease Brother         cirrhosis    Cancer Neg Hx      Social History     Tobacco Use    Smoking status: Never Smoker    Smokeless tobacco: Never Used   Substance Use Topics    Alcohol use: Yes     Comment: occassionally, 1 drink, 1-2 times a year     Patient Active Problem List    Diagnosis    Essential hypertension    Stage 3a chronic kidney disease ([facility] [address]    Situational anxiety    Great toe pain, left    Agatston coronary artery calcium score between 100 and 199    Bilateral carotid artery stenosis    Bruit of left carotid artery    Dyslipidemia    Obesity, Class I, BMI 30-34.9       Depression Risk Factor Screening:     3 most recent PHQ Screens 5/19/2022   Little interest or pleasure in doing things Not at all   Feeling down, depressed, irritable, or hopeless Not at all   Total Score PHQ 2 0   Trouble falling or staying asleep, or sleeping too much Not at all   Feeling tired or having little energy Not at all   Poor appetite, weight loss, or overeating Not at all   Feeling bad about yourself - or that you are a failure or have let yourself or your family down Not at all   Trouble concentrating on things such as school, work, reading, or watching TV Not at all   Moving or speaking so slowly that other people could have noticed; or the opposite being so fidgety that others notice Not at all   Thoughts of being better off dead, or hurting yourself in some way Not at all   PHQ 9 Score 0     Alcohol Risk Factor Screening:   Do you average more than 1 drink per night or more than 7 drinks a week:  No    On any one occasion in the past three months have you have had more than 3 drinks containing alcohol:  No    Functional Ability and Level of Safety:   Hearing Loss  Hearing is good. Activities of Daily Living  The home contains: handrails, grab bars and shower seat  Patient does total self care    Fall Risk  Fall Risk Assessment, last 12 mths 5/19/2022   Able to walk? Yes   Fall in past 12 months? 0   Do you feel unsteady?  0   Are you worried about falling 0       Abuse Screen  Patient is not abused    Cognitive Screening   Evaluation of Cognitive Function:  Has your family/caregiver stated any concerns about your memory: no      Patient Care Team   Patient Care Team:  Alvarado Areas, PA-C as PCP - General (Physician Assistant)  Christiano Hayes PA-C as PCP - [facility] Empaneled Provider    Assessment/Plan   Education and counseling provided:  Are appropriate based on today's review and evaluation  End-of-Life planning (with patient's consent)  Pneumococcal Vaccine  Influenza Vaccine  Appropriate cancer screening tests    Diagnoses and all orders for this visit:    1. Medicare annual wellness visit, subsequent    2. Colon cancer screening  -     REFERRAL TO GASTROENTEROLOGY    3. Need for shingles vaccine  -     varicella-zoster recombinant, PF, (Shingrix, PF,) 50 mcg/0.5 mL susr injection; 0.5mL by IntraMUSCular route once now and then repeat in 2-6 months    4. Essential hypertension  -     metoprolol succinate (TOPROL-XL) 25 mg XL tablet; Take 1 Tablet by mouth daily. 5. Severe obesity (BMI 35.0-39. 9) with comorbidity ([facility] [address]    6. Stage 3a chronic kidney disease ([facility] [address]    7. Hyperglycemia  -     HEMOGLOBIN A1C WITH EAG; Future    8. Hyperuricemia    9. Dyslipidemia    10. Bilateral carotid artery stenosis    11. Situational anxiety        Health Maintenance Due   Topic Date Due    Shingrix Vaccine Age 49> (1 of 2) Never done    Colorectal Cancer Screening Combo  03/29/2021    Medicare Yearly Exam  05/14/2022     She will get 2nd booster for covid vaccine  Has not had Shingrix  Colonoscopy due    HPI:  Billy Prado. Skinny Stephenson also presents for follow up of chronic conditions. Last visit 11/18/21  She had labs done on 5/11/22 to review today    Hypertension - on metoprolol XL 25 mg, compliant with medication, no recent home monitoring.  No history of heart disease or stroke.  No chest pain, no shortness of breath, no headaches, no lower extremity swelling.    eGFR >60, previously 54    -She did not take her metoprolol this morning, and she also gets nervous coming to the doctor     Dyslipidemia - , started atorvastatin 20 mg 1/06/2020 with good tolerance   -heart CT calcium score 162 (11/26/2019) which is c/w  Moderate coronary artery disease   -Carotid duplex (11/30/2021) stable from prior study in 2019  -carotid duplex (11/26/2019) showing less than 50% blockage on both carotids which is increased on the right from 2012  -first lipid recheck after starting atorvastatin (delayed during pandemic) was achieved on 5/18/2021 showing LDL 77  -she is taking ASA 81 mg \"when I think about it\"   -TODAY lipids look great, LDL 82 on 5/11/22, total 161     Anxiety - has been doing well without the buspar, she only took it for her eye procedure     Left Great toe pain  Had an episode suspicious for gout and Uric acid was 6.6 in 5/2021  She has been eating plenty of fresh fruits and veges  She is following purine restricted diet from handout I sent her as well, and drinking plenty of water  -TODAY - no foot/toe pain since last visit  Uric acid level 7.1 on 5/11/22      Patient Active Problem List    Diagnosis    Essential hypertension    Stage 3a chronic kidney disease ([facility] [address]    Situational anxiety    Great toe pain, left    Agatston coronary artery calcium score between 100 and 199    Bilateral carotid artery stenosis    Bruit of left carotid artery    Dyslipidemia    Obesity, Class I, BMI 30-34.9       See Past Medical History, Surgical History, Social History, Medications, and Allergies documented above. ROS:  ROS negative except as per HPI. PE:  Visit Vitals  BP (!) 144/94 (BP 1 Location: Left upper arm, BP Patient Position: Sitting, BP Cuff Size: Adult)   Pulse 85   Temp 97.8 °F (36.6 °C) (Temporal)   Resp 16   Ht 5' 2\" (1.575 m)   Wt 199 lb 11.2 oz (90.6 kg)   LMP 01/01/1974   SpO2 98%   BMI 36.53 kg/m²     Gen: alert, oriented, no acute distress  Head: normocephalic, atraumatic  Eyes: pupils equal round reactive to light, sclera clear, conjunctiva clear  Oral: masked  Neck: symmetric normal sized thyroid, no carotid bruits appreciated today, no jugular vein distention  Resp: no increase work of breathing, lungs clear to ausculation bilaterally, no wheezing, rales or rhonchi  CV: S1, S2 normal.  No murmurs, rubs, or gallops. Abd: soft, not tender, not distended. No hepatosplenomegaly. Normal bowel sounds. Neuro: grossly intact  Skin: no lesion or rash  Extremities: no cyanosis or edema    No results found for this visit on 05/19/22. Results for orders placed or performed in visit on 05/11/22   URIC ACID   Result Value Ref Range    Uric acid 7.1 (H) 2.6 - 6.0 MG/DL   LIPID PANEL   Result Value Ref Range    Cholesterol, total 161 <200 MG/DL    Triglyceride 95 <150 MG/DL    HDL Cholesterol 60 MG/DL    LDL, calculated 82 0 - 100 MG/DL    VLDL, calculated 19 MG/DL    CHOL/HDL Ratio 2.7 0.0 - 5.0     TSH 3RD GENERATION   Result Value Ref Range    TSH 2.49 0.36 - 0.77 uIU/mL   METABOLIC PANEL, COMPREHENSIVE   Result Value Ref Range    Sodium 140 136 - 145 mmol/L    Potassium 4.8 3.5 - 5.1 mmol/L    Chloride 107 97 - 108 mmol/L    CO2 27 21 - 32 mmol/L    Anion gap 6 5 - 15 mmol/L    Glucose 105 (H) 65 - 100 mg/dL    BUN 14 6 - 20 MG/DL    Creatinine 1.07 (H) 0.55 - 1.02 MG/DL    BUN/Creatinine ratio 13 12 - 20      GFR est AA >60 >60 ml/min/1.73m2    GFR est non-AA 50 (L) >60 ml/min/1.73m2    Calcium 9.8 8.5 - 10.1 MG/DL    Bilirubin, total 0.6 0.2 - 1.0 MG/DL    ALT (SGPT) 16 12 - 78 U/L    AST (SGOT) 21 15 - 37 U/L    Alk.  phosphatase 138 (H) 45 - 117 U/L    Protein, total 7.5 6.4 - 8.2 g/dL    Albumin 4.0 3.5 - 5.0 g/dL    Globulin 3.5 2.0 - 4.0 g/dL    A-G Ratio 1.1 1.1 - 2.2     CBC WITH AUTOMATED DIFF   Result Value Ref Range    WBC 5.7 3.6 - 11.0 K/uL    RBC 5.15 3.80 - 5.20 M/uL    HGB 14.0 11.5 - 16.0 g/dL    HCT 43.9 35.0 - 47.0 %    MCV 85.2 80.0 - 99.0 FL    MCH 27.2 26.0 - 34.0 PG    MCHC 31.9 30.0 - 36.5 g/dL    RDW 15.5 (H) 11.5 - 14.5 %    PLATELET 532 045 - 375 K/uL    MPV 12.9 8.9 - 12.9 FL    NRBC 0.0 0  WBC    ABSOLUTE NRBC 0.00 0.00 - 0.01 K/uL    NEUTROPHILS 47 32 - 75 %    LYMPHOCYTES 41 12 - 49 %    MONOCYTES 9 5 - 13 %    EOSINOPHILS 2 0 - 7 %    BASOPHILS 1 0 - 1 %    IMMATURE GRANULOCYTES 0 0.0 - 0.5 %    ABS. NEUTROPHILS 2.7 1.8 - 8.0 K/UL    ABS. LYMPHOCYTES 2.3 0.8 - 3.5 K/UL    ABS. MONOCYTES 0.5 0.0 - 1.0 K/UL    ABS. EOSINOPHILS 0.1 0.0 - 0.4 K/UL    ABS. BASOPHILS 0.0 0.0 - 0.1 K/UL    ABS. IMM. GRANS. 0.0 0.00 - 0.04 K/UL    DF AUTOMATED     URINALYSIS W/ REFLEX CULTURE    Specimen: Urine   Result Value Ref Range    Color YELLOW/STRAW      Appearance CLEAR CLEAR      Specific gravity 1.008 1.003 - 1.030      pH (UA) 6.5 5.0 - 8.0      Protein Negative Negative mg/dL    Glucose Negative Negative mg/dL    Ketone Negative Negative mg/dL    Bilirubin Negative Negative      Blood Negative Negative      Urobilinogen 0.2 0.2 - 1.0 EU/dL    Nitrites Negative Negative      Leukocyte Esterase Negative Negative      UA:UC IF INDICATED CULTURE NOT INDICATED BY UA RESULT CULTURE NOT INDICATED BY UA RESULT      WBC 0-4 0 - 4 /hpf    RBC 0-5 0 - 5 /hpf    Epithelial cells FEW FEW /lpf    Bacteria Negative Negative /hpf    Hyaline cast 0-2 0 - 5 /lpf         Assessment/Plan:      ICD-10-CM ICD-9-CM    1. Medicare annual wellness visit, subsequent  Z00.00 V70.0    2. Colon cancer screening  Z12.11 V76.51 REFERRAL TO GASTROENTEROLOGY   3. Need for shingles vaccine  Z23 V04.89 varicella-zoster recombinant, PF, (Shingrix, PF,) 50 mcg/0.5 mL susr injection   4. Essential hypertension  I10 401.9 metoprolol succinate (TOPROL-XL) 25 mg XL tablet   5. Severe obesity (BMI 35.0-39. 9) with comorbidity ([facility] [address]  E66.01 278.01    6. Stage 3a chronic kidney disease (HCC)  N18.31 585.3    7. Hyperglycemia  R73.9 790.29 HEMOGLOBIN A1C WITH EAG      HEMOGLOBIN A1C WITH EAG   8. Hyperuricemia  E79.0 790.6    9. Dyslipidemia  E78.5 272.4    10. Bilateral carotid artery stenosis  I65.23 433.10      433.30    11. Situational anxiety  F41.8 300.09        1. Medicare wellness as above    2. Referral given for colonoscopy    3. Prescription given for shingles vaccine    4.   Hypertension  Uncertain control since she did not take her medications this morning  On metoprolol XL 25 mg daily, continue same  She is to call me with blood pressure readings from home in the next couple weeks  Follow-up in 3 months for recheck, be sure to take medications before coming in and take BuSpar before her visit    5. Obesity  Weight stable from last visit although she did gain 11 pounds from last year  She is working on low-fat diet    6. CKD stage III  Current GFR greater then 60  She has hovered in the 50s on prior checks  She attributes improved kidney function with focusing on drinking plenty of water each day    7. Hyperglycemia noted on current labs, 105  Will check follow-up A1c    8. Hyperuricemia  Episode of left podagra suspicious for gout in 5/2021 and uric acid level was 6.6  I have counseled her on purine restricted diet and she has been increasing fresh fruits and vegetables  Current uric acid level 7.1 on 5/11/2022  No flares of pain since last visit  Continue purine-restricted diet, will continue to monitor, since she is not having flares and renal function is stable will defer antiuric acid therapies at this time    9. Hyperlipidemia  Controlled on current atorvastatin 20 mg daily, continue same    10. Bilateral carotid artery stenosis  No bruits appreciated on exam today  Most recent carotid duplex 11/30/2021 stable from prior study in 2019 which showed less than 50% blockage in both carotids  She is on statin  She is reminded to continue her daily aspirin    11.   Situational anxiety  She has whitecoat hypertension and anxiety with doctors appointments  She was treated in the past with BuSpar for an eye procedure she was having  Advised she could take BuSpar before coming to the doctor's appointment, and to do so before her next visit        Orders Placed This Encounter    HEMOGLOBIN A1C WITH EAG     Standing Status:   Future     Number of Occurrences:   1     Standing Expiration Date:   5/19/2023   [address] [place] [address]     Referral Priority: Routine     Referral Type:   Consultation     Referral Reason:   Specialty Services Required     Referred to Provider:   Linden Campbell MD     Number of Visits Requested:   1    varicella-zoster recombinant, PF, (Shingrix, PF,) 50 mcg/0.5 mL susr injection     Si.5mL by IntraMUSCular route once now and then repeat in 2-6 months     Dispense:  0.5 mL     Refill:  1    metoprolol succinate (TOPROL-XL) 25 mg XL tablet     Sig: Take 1 Tablet by mouth daily. Dispense:  90 Tablet     Refill:  3       Medications Discontinued During This Encounter   Medication Reason    metoprolol succinate (TOPROL-XL) 25 mg XL tablet REORDER       Current Outpatient Medications   Medication Sig Dispense Refill    varicella-zoster recombinant, PF, (Shingrix, PF,) 50 mcg/0.5 mL susr injection 0.5mL by IntraMUSCular route once now and then repeat in 2-6 months 0.5 mL 1    metoprolol succinate (TOPROL-XL) 25 mg XL tablet Take 1 Tablet by mouth daily. 90 Tablet 3    atorvastatin (LIPITOR) 20 mg tablet TAKE 1 TABLET BY MOUTH EVERY DAY AT NIGHT 90 Tablet 1    cholecalciferol (Vitamin D3) (1000 Units /25 mcg) tablet Take 1,000 Units by mouth daily.  aspirin (ASPIR-81 PO) Take  by mouth. (Patient not taking: Reported on 2021)         Recommended healthy diet low in carbohydrates, fats, sodium and cholesterol. Recommended regular cardiovascular exercise 3-6 times per week for 30-60 minutes daily. Verbal and written instructions (see AVS) provided. Patient expresses understanding of diagnosis and treatment plan. Follow-up and Dispositions    · Return in about 3 months (around 2022) for HTN.        Future Appointments   Date Time Provider Sunny Cid   2022 10:30 AM Heaven Thompson PA-C [facility]

## 2022-05-19 NOTE — ACP (ADVANCE CARE PLANNING)
Advance Care Planning (ACP) Provider Conversation Snapshot    Date of ACP Conversation: 05/19/22  Persons included in Conversation:  patient  Length of ACP Conversation in minutes:  <16 minutes (Non-Billable)    Authorized Decision Maker (if patient is incapable of making informed decisions):    This person is:   Named in Advance Directive or Healthcare Power of [facility]          For Patients with Decision Making Capacity:   Values/Goals: Exploration of values, goals, and preferences if recovery is not expected, even with continued medical treatment in the event of:  Imminent death  Severe, permanent brain injury    Conversation Outcomes / Follow-Up Plan:   She has a copy at home, she completed this many years ago, and will bring it in at next visit

## 2022-05-20 LAB
EST. AVERAGE GLUCOSE BLD GHB EST-MCNC: 128 MG/DL
HBA1C MFR BLD: 6.1 % (ref 4–5.6)

## 2022-11-02 ENCOUNTER — TELEPHONE (OUTPATIENT)
Dept: INTERNAL MEDICINE CLINIC | Age: 72
End: 2022-11-02

## 2022-11-02 NOTE — TELEPHONE ENCOUNTER
----- Message from Gabriela Calloway sent at 11/2/2022 10:10 AM EDT -----  Subject: Message to Provider    QUESTIONS  Information for Provider? Patient called in to say that she is ok with   having the new provider in the office think her name Is Peter Cobos her new   pcp please advise would like call back when she can be scheduled with the   new provider   ---------------------------------------------------------------------------  --------------  [address] [facility]  [phone]; OK to leave message on voicemail  ---------------------------------------------------------------------------  --------------  SCRIPT ANSWERS  Relationship to Patient?  Self

## 2022-11-02 NOTE — TELEPHONE ENCOUNTER
Left vm to advise patient that she has already been added to the list for the new NP and will be contacted once her schedule is established.

## 2023-01-04 DIAGNOSIS — E78.5 DYSLIPIDEMIA: ICD-10-CM

## 2023-01-10 RX ORDER — ATORVASTATIN CALCIUM 20 MG/1
TABLET, FILM COATED ORAL
Qty: 90 TABLET | Refills: 0 | Status: SHIPPED | OUTPATIENT
Start: 2023-01-10

## 2023-01-24 ENCOUNTER — OFFICE VISIT (OUTPATIENT)
Dept: INTERNAL MEDICINE CLINIC | Age: 73
End: 2023-01-24
Payer: MEDICARE

## 2023-01-24 VITALS
RESPIRATION RATE: 16 BRPM | OXYGEN SATURATION: 98 % | SYSTOLIC BLOOD PRESSURE: 140 MMHG | HEART RATE: 94 BPM | BODY MASS INDEX: 37.15 KG/M2 | WEIGHT: 201.9 LBS | DIASTOLIC BLOOD PRESSURE: 80 MMHG | TEMPERATURE: 98.9 F | HEIGHT: 62 IN

## 2023-01-24 DIAGNOSIS — N18.31 STAGE 3A CHRONIC KIDNEY DISEASE (HCC): ICD-10-CM

## 2023-01-24 DIAGNOSIS — I65.23 BILATERAL CAROTID ARTERY STENOSIS: Primary | ICD-10-CM

## 2023-01-24 DIAGNOSIS — R73.03 PREDIABETES: ICD-10-CM

## 2023-01-24 DIAGNOSIS — I10 ESSENTIAL HYPERTENSION: ICD-10-CM

## 2023-01-24 DIAGNOSIS — E66.01 SEVERE OBESITY (BMI 35.0-39.9) WITH COMORBIDITY (HCC): ICD-10-CM

## 2023-01-24 DIAGNOSIS — R09.89 BRUIT OF LEFT CAROTID ARTERY: ICD-10-CM

## 2023-01-24 DIAGNOSIS — Z12.11 COLON CANCER SCREENING: ICD-10-CM

## 2023-01-24 DIAGNOSIS — E78.5 DYSLIPIDEMIA: ICD-10-CM

## 2023-01-24 DIAGNOSIS — R93.1 AGATSTON CORONARY ARTERY CALCIUM SCORE BETWEEN 100 AND 199: ICD-10-CM

## 2023-01-24 PROCEDURE — 3079F DIAST BP 80-89 MM HG: CPT | Performed by: NURSE PRACTITIONER

## 2023-01-24 PROCEDURE — 99214 OFFICE O/P EST MOD 30 MIN: CPT | Performed by: NURSE PRACTITIONER

## 2023-01-24 PROCEDURE — 3077F SYST BP >= 140 MM HG: CPT | Performed by: NURSE PRACTITIONER

## 2023-01-24 PROCEDURE — 1123F ACP DISCUSS/DSCN MKR DOCD: CPT | Performed by: NURSE PRACTITIONER

## 2023-01-24 RX ORDER — LISINOPRIL 2.5 MG/1
2.5 TABLET ORAL DAILY
Qty: 30 TABLET | Refills: 1 | Status: SHIPPED | OUTPATIENT
Start: 2023-01-24

## 2023-01-24 NOTE — PROGRESS NOTES
Chief Complaint   Patient presents with    Establish Care     Visit Vitals  BP (!) 158/96 (BP 1 Location: Left upper arm, BP Patient Position: Sitting, BP Cuff Size: Large adult)   Pulse 94   Temp 98.9 °F (37.2 °C) (Oral)   Resp 16   Ht 5' 2\" (1.575 m)   Wt 201 lb 14.4 oz (91.6 kg)   LMP 01/01/1974   SpO2 98%   BMI 36.93 kg/m²     1. Have you been to the ER, urgent care clinic since your last visit? Hospitalized since your last visit? No    2. Have you seen or consulted any other health care providers outside of the [address] [place] [address] since your last visit? Include any pap smears or colon screening.  No

## 2023-01-24 NOTE — PROGRESS NOTES
Zeina Langston (: 1950) is a 67 y.o. female here for evaluation of the following chief complaint(s):  Establish Care         SUBJECTIVE/OBJECTIVE:  HPI-Ms. Zimmerman Ohms here today today to establish with new provider. She has no new concerns. Past medical history includes chronic kidney disease, hyperlipidemia, obesity, situational anxiety, bilateral carotid artery stenosis, hypertension. No Known Allergies    Current Outpatient Medications   Medication Sig Dispense Refill    lisinopriL (PRINIVIL, ZESTRIL) 2.5 mg tablet Take 1 Tablet by mouth daily. 30 Tablet 1    atorvastatin (LIPITOR) 20 mg tablet TAKE 1 TABLET BY MOUTH EVERY DAY AT NIGHT 90 Tablet 0    metoprolol succinate (TOPROL-XL) 25 mg XL tablet Take 1 Tablet by mouth daily. 90 Tablet 3    cholecalciferol (Vitamin D3) (1000 Units /25 mcg) tablet Take 1,000 Units by mouth daily. varicella-zoster recombinant, PF, (Shingrix, PF,) 50 mcg/0.5 mL susr injection 0.5mL by IntraMUSCular route once now and then repeat in 2-6 months (Patient not taking: Reported on 2023) 0.5 mL 1    aspirin (ASPIR-81 PO) Take  by mouth. (Patient not taking: No sig reported)          Past Medical History:   Diagnosis Date    Dyslipidemia 2012    History of acute renal failure 2012    History of chickenpox     History of measles childhood    History of mumps childhood    Obesity, Class I, BMI 30-34.9 2012    White coat syndrome without diagnosis of hypertension     exacerbated by stress     Past Surgical History:   Procedure Laterality Date    BIOPSY BREAST Right 2011    benign     COLONOSCOPY  3/29/2011         HX BREAST BIOPSY Right     us bx negative     HX CATARACT REMOVAL      HX TOTAL ABDOMINAL HYSTERECTOMY      with Unilateral oopherectomy, b/l salpingectomy due to ectopic pregnancy. Social History     Socioeconomic History    Marital status:      Spouse name: Zeeshan Gillespie.      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 fmr . Multiple brothers and sisters     Social Determinants of Health     Financial Resource Strain: Low Risk     Difficulty of Paying Living Expenses: Not hard at all   Food Insecurity: No Food Insecurity    Worried About Running Out of Food in the Last Year: Never true    Ran Out of Food in the Last Year: Never true   Transportation Needs: Not on file   Physical Activity: Not on file   Stress: Not on file   Social Connections: Not on file   Intimate Partner Violence: Not on file   Housing Stability: Not on file      Family History   Problem Relation Age of Onset    Heart Disease Mother     Diabetes Mother     Stroke Father 37        heavy alcohol    Liver Disease Brother         cirrhosis    Cancer Neg Hx        Review of Systems   Constitutional:  Negative for activity change, appetite change, diaphoresis, fatigue, fever and unexpected weight change. HENT:  Negative for congestion, ear pain, facial swelling, hearing loss, postnasal drip, rhinorrhea, sinus pressure, sinus pain, sneezing, sore throat, tinnitus, trouble swallowing and voice change. Eyes:  Negative for photophobia, pain, redness and visual disturbance. Respiratory:  Negative for apnea, cough, chest tightness, shortness of breath and wheezing. Cardiovascular:  Negative for chest pain, palpitations and leg swelling. Gastrointestinal:  Negative for abdominal distention, abdominal pain, blood in stool, constipation, diarrhea, nausea, rectal pain and vomiting. Endocrine: Negative for cold intolerance, heat intolerance, polydipsia, polyphagia and polyuria. Genitourinary:  Negative for decreased urine volume, difficulty urinating, dyspareunia, dysuria, flank pain, frequency, hematuria, menstrual problem, pelvic pain, urgency, vaginal bleeding, vaginal discharge and vaginal pain. Musculoskeletal:  Negative for arthralgias, back pain, joint swelling, myalgias, neck pain and neck stiffness. Skin:  Negative for color change and rash. Allergic/Immunologic: Negative for environmental allergies and food allergies. Neurological:  Negative for dizziness, tremors, syncope, weakness, light-headedness, numbness and headaches. Hematological:  Negative for adenopathy. Does not bruise/bleed easily. Psychiatric/Behavioral:  Negative for confusion, hallucinations, self-injury, sleep disturbance and suicidal ideas. The patient is not nervous/anxious. BP (!) 140/80   Pulse 94   Temp 98.9 °F (37.2 °C) (Oral)   Resp 16   Ht 5' 2\" (1.575 m)   Wt 201 lb 14.4 oz (91.6 kg)   LMP 01/01/1974   SpO2 98%   BMI 36.93 kg/m²    Patient's last menstrual period was 01/01/1974. Physical Exam  Constitutional:       Appearance: Normal appearance. She is obese. HENT:      Head: Normocephalic and atraumatic. Nose: Nose normal.      Mouth/Throat:      Mouth: Mucous membranes are moist.      Pharynx: Oropharynx is clear. Eyes:      Extraocular Movements: Extraocular movements intact. Conjunctiva/sclera: Conjunctivae normal.      Pupils: Pupils are equal, round, and reactive to light. Cardiovascular:      Rate and Rhythm: Normal rate and regular rhythm. Pulses: Normal pulses. Heart sounds: Normal heart sounds. Pulmonary:      Effort: Pulmonary effort is normal.      Breath sounds: Normal breath sounds. Abdominal:      General: Abdomen is flat. Bowel sounds are normal.      Palpations: Abdomen is soft. Musculoskeletal:         General: Normal range of motion. Cervical back: Normal range of motion and neck supple. Skin:     General: Skin is warm and dry. Capillary Refill: Capillary refill takes less than 2 seconds. Neurological:      General: No focal deficit present. Mental Status: She is alert and oriented to person, place, and time. Mental status is at baseline. Psychiatric:         Mood and Affect: Mood normal.         Behavior: Behavior normal.         Thought Content: Thought content normal.         Judgment: Judgment normal.       ASSESSMENT/PLAN:  Below is the assessment and plan developed based on review of pertinent history, physical exam, labs, studies, and medications. 1. Bilateral carotid artery stenosis  2. Severe obesity (BMI 35.0-39. 9) with comorbidity ([facility]  -     CBC WITH AUTOMATED DIFF; Future  -     METABOLIC PANEL, COMPREHENSIVE; Future  -     URINALYSIS W/ RFLX MICROSCOPIC; Future  3. Stage 3a chronic kidney disease ([facility]  4. Essential hypertension  5. Agatston coronary artery calcium score between 100 and 199  6. Dyslipidemia  7. Bruit of left carotid artery  8. Colon cancer screening  -     COLOGUARD TEST (FECAL DNA COLORECTAL CANCER SCREENING)  9. Prediabetes  -     TSH 3RD GENERATION; Future  -     HEMOGLOBIN A1C WITH EAG; Future  -     MICROALBUMIN, UR, RAND W/ MICROALB/CREAT RATIO; Future      No results found for this visit on 01/24/23. Return in about 1 year (around 1/24/2024). 1.  Stable at this time    2. Discussed healthy eating, weight loss, exercise. Goal is 5 pound weight loss in 3 months. 3.  Continue to monitor    4. She brought blood pressure cuff machine with her in office today. BP at home ranges from 100s-140s/60s-90s. BP in office today 140/80 after rechecking.   We will start her on lisinopril 2.5 mg.  Reviewed admission/risks/benefits/side effects of medications. 8.  She declines colonoscopy. She verbalized understanding of risks associated without getting colonoscopy. Order Cologuard test.  No family history of colon cancer she denies change in bowel habits. An electronic signature was used to authenticate this note.   -- NAINA Linder

## 2023-01-31 ENCOUNTER — APPOINTMENT (OUTPATIENT)
Dept: INTERNAL MEDICINE CLINIC | Age: 73
End: 2023-01-31

## 2023-01-31 DIAGNOSIS — E66.01 SEVERE OBESITY (BMI 35.0-39.9) WITH COMORBIDITY (HCC): ICD-10-CM

## 2023-01-31 DIAGNOSIS — R73.03 PREDIABETES: ICD-10-CM

## 2023-02-01 LAB
ALBUMIN SERPL-MCNC: 4.2 G/DL (ref 3.5–5)
ALBUMIN/GLOB SERPL: 1.2 (ref 1.1–2.2)
ALP SERPL-CCNC: 132 U/L (ref 45–117)
ALT SERPL-CCNC: 23 U/L (ref 12–78)
ANION GAP SERPL CALC-SCNC: 6 MMOL/L (ref 5–15)
APPEARANCE UR: CLEAR
AST SERPL-CCNC: 27 U/L (ref 15–37)
BASOPHILS # BLD: 0 K/UL (ref 0–0.1)
BASOPHILS NFR BLD: 1 % (ref 0–1)
BILIRUB SERPL-MCNC: 0.8 MG/DL (ref 0.2–1)
BILIRUB UR QL: NEGATIVE
BUN SERPL-MCNC: 14 MG/DL (ref 6–20)
BUN/CREAT SERPL: 12 (ref 12–20)
CALCIUM SERPL-MCNC: 9.6 MG/DL (ref 8.5–10.1)
CHLORIDE SERPL-SCNC: 105 MMOL/L (ref 97–108)
CO2 SERPL-SCNC: 28 MMOL/L (ref 21–32)
COLOR UR: ABNORMAL
CREAT SERPL-MCNC: 1.2 MG/DL (ref 0.55–1.02)
CREAT UR-MCNC: 98.8 MG/DL
DIFFERENTIAL METHOD BLD: ABNORMAL
EOSINOPHIL # BLD: 0.2 K/UL (ref 0–0.4)
EOSINOPHIL NFR BLD: 3 % (ref 0–7)
ERYTHROCYTE [DISTWIDTH] IN BLOOD BY AUTOMATED COUNT: 14.9 % (ref 11.5–14.5)
EST. AVERAGE GLUCOSE BLD GHB EST-MCNC: 126 MG/DL
GLOBULIN SER CALC-MCNC: 3.6 G/DL (ref 2–4)
GLUCOSE SERPL-MCNC: 89 MG/DL (ref 65–100)
GLUCOSE UR STRIP.AUTO-MCNC: NEGATIVE MG/DL
HBA1C MFR BLD: 6 % (ref 4–5.6)
HCT VFR BLD AUTO: 45.2 % (ref 35–47)
HGB BLD-MCNC: 14.5 G/DL (ref 11.5–16)
HGB UR QL STRIP: NEGATIVE
IMM GRANULOCYTES # BLD AUTO: 0 K/UL (ref 0–0.04)
IMM GRANULOCYTES NFR BLD AUTO: 0 % (ref 0–0.5)
KETONES UR QL STRIP.AUTO: ABNORMAL MG/DL
LEUKOCYTE ESTERASE UR QL STRIP.AUTO: NEGATIVE
LYMPHOCYTES # BLD: 2.2 K/UL (ref 0.8–3.5)
LYMPHOCYTES NFR BLD: 37 % (ref 12–49)
MCH RBC QN AUTO: 26.4 PG (ref 26–34)
MCHC RBC AUTO-ENTMCNC: 32.1 G/DL (ref 30–36.5)
MCV RBC AUTO: 82.3 FL (ref 80–99)
MICROALBUMIN UR-MCNC: 0.62 MG/DL
MICROALBUMIN/CREAT UR-RTO: 6 MG/G (ref 0–30)
MONOCYTES # BLD: 0.5 K/UL (ref 0–1)
MONOCYTES NFR BLD: 9 % (ref 5–13)
NEUTS SEG # BLD: 3 K/UL (ref 1.8–8)
NEUTS SEG NFR BLD: 50 % (ref 32–75)
NITRITE UR QL STRIP.AUTO: NEGATIVE
NRBC # BLD: 0 K/UL (ref 0–0.01)
NRBC BLD-RTO: 0 PER 100 WBC
PH UR STRIP: 5.5 (ref 5–8)
PLATELET # BLD AUTO: 185 K/UL (ref 150–400)
PMV BLD AUTO: 12.4 FL (ref 8.9–12.9)
POTASSIUM SERPL-SCNC: 4.2 MMOL/L (ref 3.5–5.1)
PROT SERPL-MCNC: 7.8 G/DL (ref 6.4–8.2)
PROT UR STRIP-MCNC: NEGATIVE MG/DL
RBC # BLD AUTO: 5.49 M/UL (ref 3.8–5.2)
SODIUM SERPL-SCNC: 139 MMOL/L (ref 136–145)
SP GR UR REFRACTOMETRY: 1.01 (ref 1–1.03)
TSH SERPL DL<=0.05 MIU/L-ACNC: 2.68 UIU/ML (ref 0.36–3.74)
UROBILINOGEN UR QL STRIP.AUTO: 0.2 EU/DL (ref 0.2–1)
WBC # BLD AUTO: 6 K/UL (ref 3.6–11)

## 2023-02-01 NOTE — PROGRESS NOTES
Continue to monitor renal function  Decrease salt, avoid nephrotoxic medications- NSAIDS, PPIs, stay well hydrated, BP goal <130/80.     Thank you

## 2023-02-07 ENCOUNTER — TELEPHONE (OUTPATIENT)
Dept: INTERNAL MEDICINE CLINIC | Age: 73
End: 2023-02-07

## 2023-02-07 NOTE — TELEPHONE ENCOUNTER
Patient called. On 1-24-23 she was prescribed Lisinopril 2.5mg daily to take in addition to her Metoprolol 25mg daily to help lower her BP. She started taking the Lisinopril on 2-1-23 at the same time as Metoprolol every morning. Since then she has felt dizzy/ lightheaded and BP readings are still elevated 140s/90s. Yesterday she had an episode of feeling very faint- her BP at that time was 145/92, pulse 74. She has not taken Lisinopril today. Please advise.

## 2023-03-02 ENCOUNTER — HOSPITAL ENCOUNTER (EMERGENCY)
Age: 73
Discharge: HOME | End: 2023-03-02
Attending: STUDENT IN AN ORGANIZED HEALTH CARE EDUCATION/TRAINING PROGRAM
Payer: MEDICARE

## 2023-03-02 VITALS
HEART RATE: 87 BPM | SYSTOLIC BLOOD PRESSURE: 174 MMHG | TEMPERATURE: 98.6 F | DIASTOLIC BLOOD PRESSURE: 99 MMHG | BODY MASS INDEX: 35.39 KG/M2 | WEIGHT: 199.74 LBS | RESPIRATION RATE: 16 BRPM | OXYGEN SATURATION: 96 % | HEIGHT: 63 IN

## 2023-03-02 DIAGNOSIS — I10 ESSENTIAL HYPERTENSION: Primary | ICD-10-CM

## 2023-03-02 LAB
ALBUMIN SERPL-MCNC: 3.9 G/DL (ref 3.5–5)
ALBUMIN/GLOB SERPL: 0.9 (ref 1.1–2.2)
ALP SERPL-CCNC: 136 U/L (ref 45–117)
ALT SERPL-CCNC: 19 U/L (ref 12–78)
ANION GAP SERPL CALC-SCNC: 6 MMOL/L (ref 5–15)
AST SERPL-CCNC: 20 U/L (ref 15–37)
BASOPHILS # BLD: 0 K/UL (ref 0–0.1)
BASOPHILS NFR BLD: 1 % (ref 0–1)
BILIRUB SERPL-MCNC: 0.4 MG/DL (ref 0.2–1)
BUN SERPL-MCNC: 15 MG/DL (ref 6–20)
BUN/CREAT SERPL: 13 (ref 12–20)
CALCIUM SERPL-MCNC: 9.4 MG/DL (ref 8.5–10.1)
CHLORIDE SERPL-SCNC: 108 MMOL/L (ref 97–108)
CO2 SERPL-SCNC: 28 MMOL/L (ref 21–32)
CREAT SERPL-MCNC: 1.17 MG/DL (ref 0.55–1.02)
DIFFERENTIAL METHOD BLD: ABNORMAL
EOSINOPHIL # BLD: 0.1 K/UL (ref 0–0.4)
EOSINOPHIL NFR BLD: 1 % (ref 0–7)
ERYTHROCYTE [DISTWIDTH] IN BLOOD BY AUTOMATED COUNT: 15.1 % (ref 11.5–14.5)
GLOBULIN SER CALC-MCNC: 4.3 G/DL (ref 2–4)
GLUCOSE SERPL-MCNC: 171 MG/DL (ref 65–100)
HCT VFR BLD AUTO: 44.7 % (ref 35–47)
HGB BLD-MCNC: 14.4 G/DL (ref 11.5–16)
IMM GRANULOCYTES # BLD AUTO: 0 K/UL (ref 0–0.04)
IMM GRANULOCYTES NFR BLD AUTO: 0 % (ref 0–0.5)
LYMPHOCYTES # BLD: 2.4 K/UL (ref 0.8–3.5)
LYMPHOCYTES NFR BLD: 32 % (ref 12–49)
MCH RBC QN AUTO: 26.7 PG (ref 26–34)
MCHC RBC AUTO-ENTMCNC: 32.2 G/DL (ref 30–36.5)
MCV RBC AUTO: 82.8 FL (ref 80–99)
MONOCYTES # BLD: 0.6 K/UL (ref 0–1)
MONOCYTES NFR BLD: 8 % (ref 5–13)
NEUTS SEG # BLD: 4.3 K/UL (ref 1.8–8)
NEUTS SEG NFR BLD: 58 % (ref 32–75)
NRBC # BLD: 0 K/UL (ref 0–0.01)
NRBC BLD-RTO: 0 PER 100 WBC
PLATELET # BLD AUTO: 199 K/UL (ref 150–400)
PMV BLD AUTO: 11.8 FL (ref 8.9–12.9)
POTASSIUM SERPL-SCNC: 4.1 MMOL/L (ref 3.5–5.1)
PROT SERPL-MCNC: 8.2 G/DL (ref 6.4–8.2)
RBC # BLD AUTO: 5.4 M/UL (ref 3.8–5.2)
SODIUM SERPL-SCNC: 142 MMOL/L (ref 136–145)
TROPONIN I SERPL HS-MCNC: 4 NG/L (ref 0–51)
WBC # BLD AUTO: 7.4 K/UL (ref 3.6–11)

## 2023-03-02 PROCEDURE — 85025 COMPLETE CBC W/AUTO DIFF WBC: CPT

## 2023-03-02 PROCEDURE — 84484 ASSAY OF TROPONIN QUANT: CPT

## 2023-03-02 PROCEDURE — 80053 COMPREHEN METABOLIC PANEL: CPT

## 2023-03-02 PROCEDURE — 93005 ELECTROCARDIOGRAM TRACING: CPT

## 2023-03-02 PROCEDURE — 36415 COLL VENOUS BLD VENIPUNCTURE: CPT

## 2023-03-02 PROCEDURE — 99284 EMERGENCY DEPT VISIT MOD MDM: CPT

## 2023-03-02 NOTE — ED PROVIDER NOTES
[facility] EMERGENCY DEPT  EMERGENCY DEPARTMENT ENCOUNTER       Pt Name: Valentino Bouchard  MRN: [id number]  [place] 1950  Date of evaluation: 3/2/2023  Provider: Tania Vela MD   PCP: NAINA Lincoln  Note Started: 1:55 PM 3/2/23     CHIEF COMPLAINT       Chief Complaint   Patient presents with    Hypertension     X2 days        HISTORY OF PRESENT ILLNESS: 1 or more elements      History From: patient, History limited by: none     Valentino Bouchard is a 67 y.o. female presents with hypertension. Notes she was on lisinopril for 5 days (2.5mg) and notes her bp went from 120 to 145, which concerned her. Today around 415 systolic. She is assymptomatic. Notes eating drinking well. Denies HA, dizziness, cp, sob, nausea, vomitting, dysuria, constiaption, diarrhea       Please See MDM for Additional Details of the HPI/PMH  Nursing Notes were all reviewed and agreed with or any disagreements were addressed in the HPI. REVIEW OF SYSTEMS        Positives and Pertinent negatives as per HPI. PAST HISTORY     Past Medical History:  Past Medical History:   Diagnosis Date    Dyslipidemia 6/19/2012    History of acute renal failure 06/19/2012    History of chickenpox     History of measles childhood    History of mumps childhood    Obesity, Class I, BMI 30-34.9 6/19/2012    White coat syndrome without diagnosis of hypertension 2005    exacerbated by stress       Past Surgical History:  Past Surgical History:   Procedure Laterality Date    BIOPSY BREAST Right 12/06/2011    benign     COLONOSCOPY  3/29/2011         HX BREAST BIOPSY Right 2012    us bx negative     HX CATARACT REMOVAL      HX TOTAL ABDOMINAL HYSTERECTOMY  1974    with Unilateral oopherectomy, b/l salpingectomy due to ectopic pregnancy.        Family History:  Family History   Problem Relation Age of Onset    Heart Disease Mother     Diabetes Mother     Stroke Father 37        heavy alcohol    Liver Disease Brother         cirrhosis    Cancer Neg Hx Social History:  Social History     Tobacco Use    Smoking status: Never    Smokeless tobacco: Never   Vaping Use    Vaping Use: Never used   Substance Use Topics    Alcohol use: Yes     Comment: occassionally, 1 drink, 1-2 times a year    Drug use: No       Allergies:  No Known Allergies    CURRENT MEDICATIONS      Previous Medications    ASPIRIN (ASPIR-81 PO)    Take  by mouth. ATORVASTATIN (LIPITOR) 20 MG TABLET    TAKE 1 TABLET BY MOUTH EVERY DAY AT NIGHT    CHOLECALCIFEROL (VITAMIN D3) (1000 UNITS /25 MCG) TABLET    Take 1,000 Units by mouth daily. LISINOPRIL (PRINIVIL, ZESTRIL) 2.5 MG TABLET    TAKE 1 TABLET BY MOUTH EVERY DAY    METOPROLOL SUCCINATE (TOPROL-XL) 25 MG XL TABLET    Take 1 Tablet by mouth daily. VARICELLA-ZOSTER RECOMBINANT, PF, (SHINGRIX, PF,) 50 MCG/0.5 ML SUSR INJECTION    0.5mL by IntraMUSCular route once now and then repeat in 2-6 months       SCREENINGS               No data recorded         PHYSICAL EXAM      ED Triage Vitals   ED Encounter Vitals Group      BP 03/02/23 1238 (!) 184/99      Pulse (Heart Rate) 03/02/23 1238 98      Resp --       Temp 03/02/23 1238 98.6 °F (37 °C)      Temp src --       O2 Sat (%) 03/02/23 1238 96 %      Weight 03/02/23 1237 199 lb 11.8 oz      Height 03/02/23 1237 5' 3\"        Physical Exam  Vitals and nursing note reviewed. Constitutional:       Appearance: Normal appearance. HENT:      Head: Normocephalic and atraumatic. Eyes:      Extraocular Movements: Extraocular movements intact. Conjunctiva/sclera: Conjunctivae normal.      Pupils: Pupils are equal, round, and reactive to light. Cardiovascular:      Rate and Rhythm: Normal rate and regular rhythm. Pulmonary:      Effort: Pulmonary effort is normal.   Abdominal:      General: Abdomen is flat. Musculoskeletal:         General: No deformity. Normal range of motion. Cervical back: Normal range of motion. Skin:     General: Skin is warm and dry.    Neurological: General: No focal deficit present. Mental Status: She is alert and oriented to person, place, and time. Psychiatric:         Mood and Affect: Mood normal.         Behavior: Behavior normal.        DIAGNOSTIC RESULTS   LABS:     Recent Results (from the past 12 hour(s))   EKG, 12 LEAD, INITIAL    Collection Time: 03/02/23 12:42 PM   Result Value Ref Range    Ventricular Rate 86 BPM    Atrial Rate 86 BPM    P-R Interval 144 ms    QRS Duration 94 ms    Q-T Interval 370 ms    QTC Calculation (Bezet) 442 ms    Calculated P Axis 48 degrees    Calculated R Axis -19 degrees    Calculated T Axis 31 degrees    Diagnosis       Normal sinus rhythm  Voltage criteria for left ventricular hypertrophy  Nonspecific ST and T wave abnormality  No previous ECGs available     CBC WITH AUTOMATED DIFF    Collection Time: 03/02/23 12:44 PM   Result Value Ref Range    WBC 7.4 3.6 - 11.0 K/uL    RBC 5.40 (H) 3.80 - 5.20 M/uL    HGB 14.4 11.5 - 16.0 g/dL    HCT 44.7 35.0 - 47.0 %    MCV 82.8 80.0 - 99.0 FL    MCH 26.7 26.0 - 34.0 PG    MCHC 32.2 30.0 - 36.5 g/dL    RDW 15.1 (H) 11.5 - 14.5 %    PLATELET 068 969 - 998 K/uL    MPV 11.8 8.9 - 12.9 FL    NRBC 0.0 0  WBC    ABSOLUTE NRBC 0.00 0.00 - 0.01 K/uL    NEUTROPHILS 58 32 - 75 %    LYMPHOCYTES 32 12 - 49 %    MONOCYTES 8 5 - 13 %    EOSINOPHILS 1 0 - 7 %    BASOPHILS 1 0 - 1 %    IMMATURE GRANULOCYTES 0 0.0 - 0.5 %    ABS. NEUTROPHILS 4.3 1.8 - 8.0 K/UL    ABS. LYMPHOCYTES 2.4 0.8 - 3.5 K/UL    ABS. MONOCYTES 0.6 0.0 - 1.0 K/UL    ABS. EOSINOPHILS 0.1 0.0 - 0.4 K/UL    ABS. BASOPHILS 0.0 0.0 - 0.1 K/UL    ABS. IMM.  GRANS. 0.0 0.00 - 0.04 K/UL    DF AUTOMATED     METABOLIC PANEL, COMPREHENSIVE    Collection Time: 03/02/23 12:44 PM   Result Value Ref Range    Sodium 142 136 - 145 mmol/L    Potassium 4.1 3.5 - 5.1 mmol/L    Chloride 108 97 - 108 mmol/L    CO2 28 21 - 32 mmol/L    Anion gap 6 5 - 15 mmol/L    Glucose 171 (H) 65 - 100 mg/dL    BUN 15 6 - 20 MG/DL    Creatinine 1.17 (H) 0.55 - 1.02 MG/DL    BUN/Creatinine ratio 13 12 - 20      eGFR 50 (L) >60 ml/min/1.73m2    Calcium 9.4 8.5 - 10.1 MG/DL    Bilirubin, total 0.4 0.2 - 1.0 MG/DL    ALT (SGPT) 19 12 - 78 U/L    AST (SGOT) 20 15 - 37 U/L    Alk. phosphatase 136 (H) 45 - 117 U/L    Protein, total 8.2 6.4 - 8.2 g/dL    Albumin 3.9 3.5 - 5.0 g/dL    Globulin 4.3 (H) 2.0 - 4.0 g/dL    A-G Ratio 0.9 (L) 1.1 - 2.2     TROPONIN-HIGH SENSITIVITY    Collection Time: 03/02/23 12:44 PM   Result Value Ref Range    Troponin-High Sensitivity 4 0 - 51 ng/L        EKG: If performed, independent interpretation documented below in the MDM section     RADIOLOGY:  Non-plain film images such as CT, Ultrasound and MRI are read by the radiologist. Plain radiographic images are visualized and preliminarily interpreted by the ED Provider with the findings documented in the MDM section. Interpretation per the Radiologist below, if available at the time of this note:     No results found. PROCEDURES   Unless otherwise noted below, none  Procedures     CRITICAL CARE TIME   none    EMERGENCY DEPARTMENT COURSE and DIFFERENTIAL DIAGNOSIS/MDM   Vitals:    Vitals:    03/02/23 1237 03/02/23 1238   BP:  (!) 184/99   Pulse:  98   Temp:  98.6 °F (37 °C)   SpO2:  96%   Weight: 90.6 kg (199 lb 11.8 oz)    Height: 5' 3\" (1.6 m)         Patient was given the following medications:  Medications - No data to display    Medical Decision Making  72yoF with PMH of hld, htn presents with htn. Vitals 184/99 on arrival.  She appears well, normal exam.  Reviewed medications, appropriate. She think her lisinopril caused her pressure to increase, though it was a low dose. Ddx includes essential htn, hypertensive urgency, hypertensive emergency. Given no symptoms, no indication for head or chest imaging. I reviewed her EKG, NSR, non-ioschemic. Reviewd her labs, wnl - cr at baseline. No signs of end organ damange.   Discussed PCP follow up which she aggrees to.    Amount and/or Complexity of Data Reviewed  Labs: ordered. ECG/medicine tests: ordered. Risk  Prescription drug management. FINAL IMPRESSION     1. Essential hypertension          DISPOSITION/PLAN   Wendy Deal's  results have been reviewed with her. She has been counseled regarding her diagnosis, treatment, and plan. She verbally conveys understanding and agreement of the signs, symptoms, diagnosis, treatment and prognosis and additionally agrees to follow up as discussed. She also agrees with the care-plan and conveys that all of her questions have been answered. I have also provided discharge instructions for her that include: educational information regarding their diagnosis and treatment, and list of reasons why they would want to return to the ED prior to their follow-up appointment, should her condition change. CLINICAL IMPRESSION    Discharge Note: The patient is stable for discharge home. The signs, symptoms, diagnosis, and discharge instructions have been discussed, understanding conveyed, and agreed upon. The patient is to follow up as recommended or return to ER should their symptoms worsen. PATIENT REFERRED TO:  Follow-up Information       Follow up With Specialties Details Why Contact Info    Nicola Bridges, [address] [facility] Nurse Practitioner, Gerontology Call in 1 day  Tato Yanes [address] [place] [address]  [phone]      [facility] EMERGENCY DEPT Emergency Medicine  If symptoms worsen [address] [place] [address]  [phone]              DISCHARGE MEDICATIONS:  Current Discharge Medication List            DISCONTINUED MEDICATIONS:  Current Discharge Medication List          I am the Primary Clinician of Record. Turner Sweeney MD (electronically signed)    (Please note that parts of this dictation were completed with voice recognition software.  Quite often unanticipated grammatical, syntax, homophones, and other interpretive errors are inadvertently transcribed by the computer software. Please disregards these errors.  Please excuse any errors that have escaped final proofreading.)

## 2023-03-02 NOTE — ED NOTES
reviewed discharge instructions with the patient. The patient verbalized understanding. All questions and concerns were addressed. The patient declined a wheelchair and is discharged ambulatory in the care of family members with instructions. Pt is alert and oriented x 4. Respirations are clear and unlabored.

## 2023-03-03 LAB
ATRIAL RATE: 86 BPM
CALCULATED P AXIS, ECG09: 48 DEGREES
CALCULATED R AXIS, ECG10: -19 DEGREES
CALCULATED T AXIS, ECG11: 31 DEGREES
DIAGNOSIS, 93000: NORMAL
P-R INTERVAL, ECG05: 144 MS
Q-T INTERVAL, ECG07: 370 MS
QRS DURATION, ECG06: 94 MS
QTC CALCULATION (BEZET), ECG08: 442 MS
VENTRICULAR RATE, ECG03: 86 BPM

## 2023-03-15 ENCOUNTER — OFFICE VISIT (OUTPATIENT)
Dept: INTERNAL MEDICINE CLINIC | Age: 73
End: 2023-03-15
Payer: MEDICARE

## 2023-03-15 VITALS
BODY MASS INDEX: 36.14 KG/M2 | WEIGHT: 196.4 LBS | TEMPERATURE: 98.6 F | HEIGHT: 62 IN | SYSTOLIC BLOOD PRESSURE: 156 MMHG | HEART RATE: 100 BPM | OXYGEN SATURATION: 96 % | RESPIRATION RATE: 16 BRPM | DIASTOLIC BLOOD PRESSURE: 100 MMHG

## 2023-03-15 DIAGNOSIS — J30.89 ENVIRONMENTAL AND SEASONAL ALLERGIES: Primary | ICD-10-CM

## 2023-03-15 DIAGNOSIS — I10 ESSENTIAL HYPERTENSION: ICD-10-CM

## 2023-03-15 DIAGNOSIS — R05.1 ACUTE COUGH: ICD-10-CM

## 2023-03-15 PROCEDURE — 3078F DIAST BP <80 MM HG: CPT | Performed by: NURSE PRACTITIONER

## 2023-03-15 PROCEDURE — 99214 OFFICE O/P EST MOD 30 MIN: CPT | Performed by: NURSE PRACTITIONER

## 2023-03-15 PROCEDURE — 1123F ACP DISCUSS/DSCN MKR DOCD: CPT | Performed by: NURSE PRACTITIONER

## 2023-03-15 PROCEDURE — 3074F SYST BP LT 130 MM HG: CPT | Performed by: NURSE PRACTITIONER

## 2023-03-15 RX ORDER — BENZONATATE 200 MG/1
200 CAPSULE ORAL
Qty: 21 CAPSULE | Refills: 0 | Status: SHIPPED | OUTPATIENT
Start: 2023-03-15 | End: 2023-03-22

## 2023-03-15 RX ORDER — AMLODIPINE BESYLATE 5 MG/1
5 TABLET ORAL DAILY
Qty: 30 TABLET | Refills: 1 | Status: SHIPPED | OUTPATIENT
Start: 2023-03-15

## 2023-03-15 RX ORDER — LISINOPRIL 2.5 MG/1
2.5 TABLET ORAL DAILY
Qty: 30 TABLET | Refills: 1 | OUTPATIENT
Start: 2023-03-15

## 2023-03-15 NOTE — TELEPHONE ENCOUNTER
PCP: NAINA Chadwick    Last appt: 1/31/2023  Future Appointments   Date Time Provider Sunny Cid   3/15/2023  3:00 PM NAINA Chadwick [facility] BS AMB   1/24/2024  3:00 PM NAINA Chadwick BS AMB       Requested Prescriptions     Pending Prescriptions Disp Refills    lisinopriL (PRINIVIL, ZESTRIL) 2.5 mg tablet 30 Tablet 1     Sig: Take 1 Tablet by mouth daily.        Prior labs and Blood pressures:  BP Readings from Last 3 Encounters:   03/02/23 (!) 174/99   01/24/23 (!) 140/80   05/19/22 (!) 144/94     Lab Results   Component Value Date/Time    Sodium 142 03/02/2023 12:44 PM    Potassium 4.1 03/02/2023 12:44 PM    Chloride 108 03/02/2023 12:44 PM    CO2 28 03/02/2023 12:44 PM    Anion gap 6 03/02/2023 12:44 PM    Glucose 171 (H) 03/02/2023 12:44 PM    BUN 15 03/02/2023 12:44 PM    Creatinine 1.17 (H) 03/02/2023 12:44 PM    BUN/Creatinine ratio 13 03/02/2023 12:44 PM    GFR est AA >60 05/11/2022 10:28 AM    GFR est non-AA 50 (L) 05/11/2022 10:28 AM    Calcium 9.4 03/02/2023 12:44 PM     Lab Results   Component Value Date/Time    Hemoglobin A1c 6.0 (H) 01/31/2023 10:13 AM     Lab Results   Component Value Date/Time    Cholesterol, total 161 05/11/2022 10:28 AM    HDL Cholesterol 60 05/11/2022 10:28 AM    LDL, calculated 82 05/11/2022 10:28 AM    VLDL, calculated 19 05/11/2022 10:28 AM    Triglyceride 95 05/11/2022 10:28 AM    CHOL/HDL Ratio 2.7 05/11/2022 10:28 AM     Lab Results   Component Value Date/Time    VITAMIN D, 25-HYDROXY 31.2 11/07/2018 12:53 PM       Lab Results   Component Value Date/Time    TSH 2.68 01/31/2023 10:13 AM

## 2023-03-15 NOTE — PROGRESS NOTES
Chief Complaint   Patient presents with    Hypertension     1 month follow up     Visit Vitals  BP (!) 156/100 (BP 1 Location: Left upper arm, BP Patient Position: Sitting, BP Cuff Size: Large adult)   Pulse 100   Temp 98.6 °F (37 °C) (Oral)   Resp 16   Ht 5' 2\" (1.575 m)   Wt 196 lb 6.4 oz (89.1 kg)   LMP 01/01/1974   SpO2 96%   BMI 35.92 kg/m²     1. Have you been to the ER, urgent care clinic since your last visit? Hospitalized since your last visit? 3/2/23 ED [facility] for elevated bp    2. Have you seen or consulted any other health care providers outside of the [address] [place] [address] since your last visit? Include any pap smears or colon screening.  No

## 2023-03-16 NOTE — PROGRESS NOTES
Adolph Dhaliwal (: 1950) is a 67 y.o. female here for evaluation of the following chief complaint(s):  Hypertension (1 month follow up)         SUBJECTIVE/OBJECTIVE:  HPI    Ms. Jairo Garay here today to follow-up on blood pressure as well as she complains of dry cough and nasal congestion for approximately 1 month. At her last office visit her blood pressure was elevated I started her on low-dose of lisinopril she reports this increased her blood pressure she would stop taking. She had gone to [facility] [facility]- due to elevated blood pressure-EKG showed sinus rhythm with electrical LVH. She denies chest pain, heart palpitations, lower extremity edema, dyspnea. Plan we will increase metoprolol succinate to 25 mg daily. And start amlodipine 5 mg daily with a blood pressure goal of less than 130/80. She does have stage III CKD. Tight BP control desired. No Known Allergies    Current Outpatient Medications   Medication Sig Dispense Refill    benzonatate (TESSALON) 200 mg capsule Take 1 Capsule by mouth three (3) times daily as needed for Cough for up to 7 days. 21 Capsule 0    amLODIPine (NORVASC) 5 mg tablet Take 1 Tablet by mouth daily. 30 Tablet 1    atorvastatin (LIPITOR) 20 mg tablet TAKE 1 TABLET BY MOUTH EVERY DAY AT NIGHT 90 Tablet 0    metoprolol succinate (TOPROL-XL) 25 mg XL tablet Take 1 Tablet by mouth daily. 90 Tablet 3    cholecalciferol (Vitamin D3) (1000 Units /25 mcg) tablet Take 1,000 Units by mouth daily. varicella-zoster recombinant, PF, (Shingrix, PF,) 50 mcg/0.5 mL susr injection 0.5mL by IntraMUSCular route once now and then repeat in 2-6 months (Patient not taking: No sig reported) 0.5 mL 1    aspirin (ASPIR-81 PO) Take  by mouth.  (Patient not taking: No sig reported)          Past Medical History:   Diagnosis Date    Dyslipidemia 2012    History of acute renal failure 2012    History of chickenpox     History of measles childhood    History of mumps childhood    Obesity, Class I, BMI 30-34.9 6/19/2012    White coat syndrome without diagnosis of hypertension 2005    exacerbated by stress     Past Surgical History:   Procedure Laterality Date    BIOPSY BREAST Right 12/06/2011    benign     COLONOSCOPY  3/29/2011         HX BREAST BIOPSY Right 2012    us bx negative     HX CATARACT REMOVAL      HX TOTAL ABDOMINAL HYSTERECTOMY  1974    with Unilateral oopherectomy, b/l salpingectomy due to ectopic pregnancy. Social History     Socioeconomic History    Marital status:      Spouse name: Christie Marinelli. 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 fmr .      Multiple brothers and sisters     Social Determinants of Health     Financial Resource Strain: Low Risk     Difficulty of Paying Living Expenses: Not hard at all   Food Insecurity: No Food Insecurity    Worried About Running Out of Food in the Last Year: Never true    Ran Out of Food in the Last Year: Never true   Transportation Needs: Not on file   Physical Activity: Not on file   Stress: Not on file   Social Connections: Not on file   Intimate Partner Violence: Not on file   Housing Stability: Not on file      Family History   Problem Relation Age of Onset    Heart Disease Mother     Diabetes Mother     Stroke Father 37        heavy alcohol    Liver Disease Brother         cirrhosis Cancer Neg Hx        Review of Systems   Constitutional:  Negative for activity change, appetite change, diaphoresis, fatigue, fever and unexpected weight change. HENT:  Positive for postnasal drip. Negative for congestion, ear pain, facial swelling, hearing loss, rhinorrhea, sinus pressure, sinus pain, sneezing, sore throat, tinnitus, trouble swallowing and voice change. Eyes:  Negative for photophobia, pain, redness and visual disturbance. Respiratory:  Positive for cough. Negative for apnea, chest tightness, shortness of breath and wheezing. Cardiovascular:  Negative for chest pain, palpitations and leg swelling. Gastrointestinal:  Negative for abdominal distention, abdominal pain, blood in stool, constipation, diarrhea, nausea, rectal pain and vomiting. Endocrine: Negative for cold intolerance, heat intolerance, polydipsia, polyphagia and polyuria. Genitourinary:  Negative for decreased urine volume, difficulty urinating, dyspareunia, dysuria, flank pain, frequency, hematuria, menstrual problem, pelvic pain, urgency, vaginal bleeding, vaginal discharge and vaginal pain. Musculoskeletal:  Negative for arthralgias, back pain, joint swelling, myalgias, neck pain and neck stiffness. Skin:  Negative for color change and rash. Allergic/Immunologic: Negative for environmental allergies and food allergies. Neurological:  Negative for dizziness, tremors, syncope, weakness, light-headedness, numbness and headaches. Hematological:  Negative for adenopathy. Does not bruise/bleed easily. Psychiatric/Behavioral:  Negative for confusion, hallucinations, self-injury, sleep disturbance and suicidal ideas. The patient is not nervous/anxious.       BP (!) 156/100 (BP 1 Location: Left upper arm, BP Patient Position: Sitting, BP Cuff Size: Large adult)   Pulse 100   Temp 98.6 °F (37 °C) (Oral)   Resp 16   Ht 5' 2\" (1.575 m)   Wt 196 lb 6.4 oz (89.1 kg)   LMP 01/01/1974   SpO2 96%   BMI 35.92 kg/m² Patient's last menstrual period was 01/01/1974. Physical Exam  Constitutional:       Appearance: Normal appearance. HENT:      Head: Normocephalic and atraumatic. Comments: Post nasal drip     Right Ear: Tympanic membrane, ear canal and external ear normal.      Left Ear: Tympanic membrane, ear canal and external ear normal.      Nose: Congestion present. Mouth/Throat:      Mouth: Mucous membranes are moist.      Pharynx: Oropharynx is clear. Eyes:      Extraocular Movements: Extraocular movements intact. Conjunctiva/sclera: Conjunctivae normal.      Pupils: Pupils are equal, round, and reactive to light. Cardiovascular:      Rate and Rhythm: Normal rate and regular rhythm. Pulmonary:      Effort: Pulmonary effort is normal.      Breath sounds: Normal breath sounds. Musculoskeletal:      Cervical back: Normal range of motion and neck supple. Neurological:      Mental Status: She is alert. ASSESSMENT/PLAN:  Below is the assessment and plan developed based on review of pertinent history, physical exam, labs, studies, and medications. 1. Environmental and seasonal allergies  2. Acute cough  3. Essential hypertension    1. Start Claritin 10 mg every day as well as 2 sprays each nostril of Flonase. Will prescribe Tessalon Perles to take at night to help with cough which she reports is worse at night. 2..  Lungs clear on exam.  EKG 3/2/2023 sinus rhythm. We discussed discussed if symptoms get worse to go to emergency room, she verbalized understanding. 3.  Current uncontrolled blood pressure. Increase metoprolol succinate to 50 mg daily as well as addition of amlodipine 5 mg daily. She is to monitor her blood pressure daily with a goal of less than 130/80 if this is remaining elevated she will call the office. I am holding off on thiazide like diuretic such as chlorthalidone due to CKD she does not complain of lower extremity edema or dyspnea.     Return to office in 1 month sooner if needed. She verbalized understanding. Reviewed all med admin/risk/benefits/side effects. No results found for this visit on 03/15/23. No follow-ups on file. An electronic signature was used to authenticate this note.   -- NAINA Norwood

## 2023-04-24 ENCOUNTER — OFFICE VISIT (OUTPATIENT)
Dept: INTERNAL MEDICINE CLINIC | Age: 73
End: 2023-04-24
Payer: MEDICARE

## 2023-04-24 VITALS
OXYGEN SATURATION: 98 % | SYSTOLIC BLOOD PRESSURE: 140 MMHG | TEMPERATURE: 98.1 F | BODY MASS INDEX: 36.4 KG/M2 | WEIGHT: 197.8 LBS | HEIGHT: 62 IN | RESPIRATION RATE: 16 BRPM | HEART RATE: 90 BPM | DIASTOLIC BLOOD PRESSURE: 80 MMHG

## 2023-04-24 DIAGNOSIS — R05.1 ACUTE COUGH: ICD-10-CM

## 2023-04-24 DIAGNOSIS — I10 ESSENTIAL HYPERTENSION: Primary | ICD-10-CM

## 2023-04-24 PROCEDURE — 3077F SYST BP >= 140 MM HG: CPT | Performed by: NURSE PRACTITIONER

## 2023-04-24 PROCEDURE — 3079F DIAST BP 80-89 MM HG: CPT | Performed by: NURSE PRACTITIONER

## 2023-04-24 PROCEDURE — 1123F ACP DISCUSS/DSCN MKR DOCD: CPT | Performed by: NURSE PRACTITIONER

## 2023-04-24 PROCEDURE — 99213 OFFICE O/P EST LOW 20 MIN: CPT | Performed by: NURSE PRACTITIONER

## 2023-04-24 RX ORDER — AMLODIPINE BESYLATE 10 MG/1
10 TABLET ORAL DAILY
Qty: 90 TABLET | Refills: 0 | Status: SHIPPED | OUTPATIENT
Start: 2023-04-24

## 2023-04-24 NOTE — PROGRESS NOTES
Loreto Pearl (: 1950) is a 68 y.o. female here for evaluation of the following chief complaint(s):  Hypertension, Obesity, and Anxiety (1 month followup)         SUBJECTIVE/OBJECTIVE:  HPI    Ms. Sussy Denny here today to follow-up on hypertension and acute cough. She reports her BP at home has been 117/80s. Is no longer having cough since starting Claritin 10 mg daily. She denies CP, dyspnea, LE edema, heart palpitations. No Known Allergies    Current Outpatient Medications   Medication Sig Dispense Refill    amLODIPine (NORVASC) 10 mg tablet Take 1 Tablet by mouth daily. 90 Tablet 0    atorvastatin (LIPITOR) 20 mg tablet TAKE 1 TABLET BY MOUTH EVERY DAY AT NIGHT 90 Tablet 0    metoprolol succinate (TOPROL-XL) 25 mg XL tablet Take 1 Tablet by mouth daily. 90 Tablet 3    cholecalciferol (Vitamin D3) (1000 Units /25 mcg) tablet Take 1 Tablet by mouth daily. Past Medical History:   Diagnosis Date    Dyslipidemia 2012    History of acute renal failure 2012    History of chickenpox     History of measles childhood    History of mumps childhood    Obesity, Class I, BMI 30-34.9 2012    White coat syndrome without diagnosis of hypertension     exacerbated by stress     Past Surgical History:   Procedure Laterality Date    BIOPSY BREAST Right 2011    benign     COLONOSCOPY  3/29/2011         HX BREAST BIOPSY Right     us bx negative     HX CATARACT REMOVAL      HX TOTAL ABDOMINAL HYSTERECTOMY      with Unilateral oopherectomy, b/l salpingectomy due to ectopic pregnancy. Social History     Socioeconomic History    Marital status:      Spouse name: Marcelo Babinski.      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 fmr . Multiple brothers and sisters     Social Determinants of Health     Financial Resource Strain: Low Risk     Difficulty of Paying Living Expenses: Not hard at all   Food Insecurity: No Food Insecurity    Worried About Running Out of Food in the Last Year: Never true    Ran Out of Food in the Last Year: Never true   Transportation Needs: Not on file   Physical Activity: Not on file   Stress: Not on file   Social Connections: Not on file   Intimate Partner Violence: Not on file   Housing Stability: Not on file      Family History   Problem Relation Age of Onset    Heart Disease Mother     Diabetes Mother     Stroke Father 37        heavy alcohol    Liver Disease Brother         cirrhosis    Cancer Neg Hx        Review of Systems   Constitutional:  Negative for activity change, appetite change, diaphoresis, fatigue, fever and unexpected weight change. HENT:  Negative for congestion, ear pain, facial swelling, hearing loss, postnasal drip, rhinorrhea, sinus pressure, sinus pain, sneezing, sore throat, tinnitus, trouble swallowing and voice change. Eyes:  Negative for photophobia, pain, redness and visual disturbance. Respiratory:  Negative for apnea, cough, chest tightness, shortness of breath and wheezing. Cardiovascular:  Negative for chest pain, palpitations and leg swelling. Gastrointestinal:  Negative for abdominal distention, abdominal pain, blood in stool, constipation, diarrhea, nausea, rectal pain and vomiting. Endocrine: Negative for cold intolerance, heat intolerance, polydipsia, polyphagia and polyuria. Genitourinary:  Negative for decreased urine volume, difficulty urinating, dyspareunia, dysuria, flank pain, frequency, hematuria, menstrual problem, pelvic pain, urgency, vaginal bleeding, vaginal discharge and vaginal pain. Musculoskeletal:  Negative for arthralgias, back pain, joint swelling, myalgias, neck pain and neck stiffness. Skin:  Negative for color change and rash. Allergic/Immunologic: Negative for environmental allergies and food allergies. Neurological:  Negative for dizziness, tremors, syncope, weakness, light-headedness, numbness and headaches. Hematological:  Negative for adenopathy. Does not bruise/bleed easily. Psychiatric/Behavioral:  Negative for confusion, hallucinations, self-injury, sleep disturbance and suicidal ideas. The patient is not nervous/anxious. BP (!) 140/80   Pulse 90   Temp 98.1 °F (36.7 °C)   Resp 16   Ht 5' 2\" (1.575 m)   Wt 197 lb 12.8 oz (89.7 kg)   LMP 01/01/1974   SpO2 98%   BMI 36.18 kg/m²    Patient's last menstrual period was 01/01/1974. Physical Exam  Constitutional:       Appearance: Normal appearance. She is obese. HENT:      Head: Normocephalic and atraumatic. Nose: Nose normal.      Mouth/Throat:      Mouth: Mucous membranes are moist.      Pharynx: Oropharynx is clear. Eyes:      Extraocular Movements: Extraocular movements intact. Conjunctiva/sclera: Conjunctivae normal.      Pupils: Pupils are equal, round, and reactive to light. Cardiovascular:      Rate and Rhythm: Normal rate and regular rhythm. Pulses: Normal pulses. Heart sounds: Normal heart sounds. Pulmonary:      Effort: Pulmonary effort is normal.      Breath sounds: Normal breath sounds. Abdominal:      General: Abdomen is flat. Bowel sounds are normal.      Palpations: Abdomen is soft. Musculoskeletal:         General: Normal range of motion. Cervical back: Normal range of motion and neck supple.    Skin:     General: Skin is warm and dry.      Capillary Refill: Capillary refill takes less than 2 seconds. Neurological:      General: No focal deficit present. Mental Status: She is alert and oriented to person, place, and time. Mental status is at baseline. Psychiatric:         Mood and Affect: Mood normal.         Behavior: Behavior normal.         Thought Content: Thought content normal.         Judgment: Judgment normal.       ASSESSMENT/PLAN:  Below is the assessment and plan developed based on review of pertinent history, physical exam, labs, studies, and medications. 1. Essential hypertension  2. Acute cough     BP not at goal in office. Will increase Amlodipine to 10 mg daily. Her BP goal is <130/80. Anxiety in office could be contributing to elevation in office. She will monitor BP at home with goal of <130/80  will contact office as needed. Check BP 3x/week. Continue toprol XL 25 mg daily. No longer having cough      RTC in 3 months, sooner if needed. No results found for this visit on 04/24/23. No follow-ups on file. An electronic signature was used to authenticate this note.   -- NAINA Canseco

## 2023-04-28 ENCOUNTER — TRANSCRIBE ORDERS (OUTPATIENT)
Facility: HOSPITAL | Age: 73
End: 2023-04-28

## 2023-04-28 DIAGNOSIS — Z12.31 SCREENING MAMMOGRAM FOR HIGH-RISK PATIENT: Primary | ICD-10-CM

## 2023-05-19 ENCOUNTER — HOSPITAL ENCOUNTER (OUTPATIENT)
Facility: HOSPITAL | Age: 73
End: 2023-05-19
Payer: MEDICARE

## 2023-05-19 DIAGNOSIS — Z12.31 SCREENING MAMMOGRAM FOR HIGH-RISK PATIENT: ICD-10-CM

## 2023-05-19 PROCEDURE — 77063 BREAST TOMOSYNTHESIS BI: CPT

## 2023-07-09 DIAGNOSIS — I10 ESSENTIAL (PRIMARY) HYPERTENSION: ICD-10-CM

## 2023-07-10 DIAGNOSIS — E78.5 HYPERLIPIDEMIA, UNSPECIFIED: ICD-10-CM

## 2023-07-10 RX ORDER — METOPROLOL SUCCINATE 25 MG/1
TABLET, EXTENDED RELEASE ORAL
Qty: 90 TABLET | Refills: 3 | Status: SHIPPED | OUTPATIENT
Start: 2023-07-10

## 2023-07-10 RX ORDER — ATORVASTATIN CALCIUM 20 MG/1
TABLET, FILM COATED ORAL
Qty: 90 TABLET | Refills: 0 | Status: SHIPPED | OUTPATIENT
Start: 2023-07-10

## 2023-07-10 NOTE — TELEPHONE ENCOUNTER
PCP: DONATO Campos NP    Last appt: 5/19/2022  Future Appointments   Date Time Provider [address]   8/24/2023  1:30 PM DONATO Campos NP [facility] AMB   1/24/2024  3:00 PM DONATO Campos NP [facility] AMB       Requested Prescriptions     Pending Prescriptions Disp Refills    metoprolol succinate (TOPROL XL) 25 MG extended release tablet [Pharmacy Med Name: METOPROLOL SUCC ER 25 MG TAB] 90 tablet 3     Sig: TAKE 1 TABLET BY MOUTH EVERY DAY       Prior labs and Blood pressures:  BP Readings from Last 3 Encounters:   04/24/23 (!) 140/80   03/15/23 (!) 156/100   01/24/23 (!) 140/80     Lab Results   Component Value Date/Time     03/02/2023 12:44 PM    K 4.1 03/02/2023 12:44 PM     03/02/2023 12:44 PM    CO2 28 03/02/2023 12:44 PM    BUN 15 03/02/2023 12:44 PM    GFRAA >60 05/11/2022 10:28 AM     No results found for: HBA1C, THY3DREN  Lab Results   Component Value Date/Time    CHOL 161 05/11/2022 10:28 AM    HDL 60 05/11/2022 10:28 AM     No results found for: VITD3, VD3RIA        Lab Results   Component Value Date/Time    TSH 2.68 01/31/2023 10:13 AM

## 2023-07-10 NOTE — TELEPHONE ENCOUNTER
PCP: DONATO Arrington NP    Last appt: 4/24/2023  Future Appointments   Date Time Provider [address]   8/24/2023  1:30 PM DONATO Arrington NP [facility] AMB   1/24/2024  3:00 PM DONATO Arrington NP BS AMB       Requested Prescriptions     Pending Prescriptions Disp Refills    atorvastatin (LIPITOR) 20 MG tablet [Pharmacy Med Name: ATORVASTATIN 20 MG TABLET] 90 tablet      Sig: TAKE 1 TABLET BY MOUTH EVERY DAY AT NIGHT       Prior labs and Blood pressures:  BP Readings from Last 3 Encounters:   04/24/23 (!) 140/80   03/15/23 (!) 156/100   01/24/23 (!) 140/80     Lab Results   Component Value Date/Time     03/02/2023 12:44 PM    K 4.1 03/02/2023 12:44 PM     03/02/2023 12:44 PM    CO2 28 03/02/2023 12:44 PM    BUN 15 03/02/2023 12:44 PM    GFRAA >60 05/11/2022 10:28 AM     No results found for: HBA1C, [id number]  Lab Results   Component Value Date/Time    CHOL 161 05/11/2022 10:28 AM    HDL 60 05/11/2022 10:28 AM     No results found for: VITD3, VD3RIA        Lab Results   Component Value Date/Time    TSH 2.68 01/31/2023 10:13 AM

## 2023-07-24 NOTE — TELEPHONE ENCOUNTER
PCP: DONATO Rodriguez NP    Last appt: 4/24/2023  Future Appointments   Date Time Provider [address]   8/24/2023  1:30 PM DONATO Rodriguez NP [facility] AM[facility]   1/24/2024  3:00 PM DONATO Rodriguez NP [facility] AMB       Requested Prescriptions     Pending Prescriptions Disp Refills    amLODIPine (NORVASC) 10 MG tablet [Pharmacy Med Name: AMLODIPINE BESYLATE 10 MG TAB] 90 tablet 1     Sig: TAKE 1 TABLET BY MOUTH EVERY DAY       Prior labs and Blood pressures:  BP Readings from Last 3 Encounters:   04/24/23 (!) 140/80   03/15/23 (!) 156/100   01/24/23 (!) 140/80     Lab Results   Component Value Date/Time     03/02/2023 12:44 PM    K 4.1 03/02/2023 12:44 PM     03/02/2023 12:44 PM    CO2 28 03/02/2023 12:44 PM    BUN 15 03/02/2023 12:44 PM    GFRAA >60 05/11/2022 10:28 AM     No results found for: HBA1C, [id number]  Lab Results   Component Value Date/Time    CHOL 161 05/11/2022 10:28 AM    HDL 60 05/11/2022 10:28 AM     No results found for: VITD3, VD3RIA        Lab Results   Component Value Date/Time    TSH 2.68 01/31/2023 10:13 AM

## 2023-07-25 RX ORDER — AMLODIPINE BESYLATE 10 MG/1
TABLET ORAL
Qty: 90 TABLET | Refills: 1 | Status: SHIPPED | OUTPATIENT
Start: 2023-07-25

## 2023-08-28 ENCOUNTER — OFFICE VISIT (OUTPATIENT)
Facility: CLINIC | Age: 73
End: 2023-08-28
Payer: MEDICARE

## 2023-08-28 VITALS
OXYGEN SATURATION: 96 % | BODY MASS INDEX: 36.75 KG/M2 | HEIGHT: 62 IN | SYSTOLIC BLOOD PRESSURE: 132 MMHG | HEART RATE: 100 BPM | DIASTOLIC BLOOD PRESSURE: 87 MMHG | RESPIRATION RATE: 18 BRPM | WEIGHT: 199.7 LBS | TEMPERATURE: 98 F

## 2023-08-28 DIAGNOSIS — E78.5 DYSLIPIDEMIA: ICD-10-CM

## 2023-08-28 DIAGNOSIS — J30.89 ENVIRONMENTAL AND SEASONAL ALLERGIES: ICD-10-CM

## 2023-08-28 DIAGNOSIS — N18.31 STAGE 3A CHRONIC KIDNEY DISEASE (HCC): Primary | ICD-10-CM

## 2023-08-28 DIAGNOSIS — R73.03 PREDIABETES: ICD-10-CM

## 2023-08-28 DIAGNOSIS — I10 ESSENTIAL HYPERTENSION: ICD-10-CM

## 2023-08-28 PROBLEM — R05.1 ACUTE COUGH: Status: RESOLVED | Noted: 2023-03-15 | Resolved: 2023-08-28

## 2023-08-28 PROCEDURE — 3078F DIAST BP <80 MM HG: CPT | Performed by: NURSE PRACTITIONER

## 2023-08-28 PROCEDURE — 1123F ACP DISCUSS/DSCN MKR DOCD: CPT | Performed by: NURSE PRACTITIONER

## 2023-08-28 PROCEDURE — 99213 OFFICE O/P EST LOW 20 MIN: CPT | Performed by: NURSE PRACTITIONER

## 2023-08-28 PROCEDURE — 3074F SYST BP LT 130 MM HG: CPT | Performed by: NURSE PRACTITIONER

## 2023-08-28 SDOH — ECONOMIC STABILITY: FOOD INSECURITY: WITHIN THE PAST 12 MONTHS, YOU WORRIED THAT YOUR FOOD WOULD RUN OUT BEFORE YOU GOT MONEY TO BUY MORE.: NEVER TRUE

## 2023-08-28 SDOH — ECONOMIC STABILITY: FOOD INSECURITY: WITHIN THE PAST 12 MONTHS, THE FOOD YOU BOUGHT JUST DIDN'T LAST AND YOU DIDN'T HAVE MONEY TO GET MORE.: NEVER TRUE

## 2023-08-28 SDOH — ECONOMIC STABILITY: HOUSING INSECURITY
IN THE LAST 12 MONTHS, WAS THERE A TIME WHEN YOU DID NOT HAVE A STEADY PLACE TO SLEEP OR SLEPT IN A SHELTER (INCLUDING NOW)?: NO

## 2023-08-28 SDOH — ECONOMIC STABILITY: INCOME INSECURITY: HOW HARD IS IT FOR YOU TO PAY FOR THE VERY BASICS LIKE FOOD, HOUSING, MEDICAL CARE, AND HEATING?: NOT HARD AT ALL

## 2023-08-28 ASSESSMENT — PATIENT HEALTH QUESTIONNAIRE - PHQ9
2. FEELING DOWN, DEPRESSED OR HOPELESS: 0
SUM OF ALL RESPONSES TO PHQ9 QUESTIONS 1 & 2: 0
SUM OF ALL RESPONSES TO PHQ QUESTIONS 1-9: 0
1. LITTLE INTEREST OR PLEASURE IN DOING THINGS: 0
SUM OF ALL RESPONSES TO PHQ QUESTIONS 1-9: 0

## 2023-08-28 NOTE — PROGRESS NOTES
Tyra Israel (: 1950) is a 68 y.o. female here for evaluation of the following chief complaint(s):  Hypertension         SUBJECTIVE/OBJECTIVE:  HPI    Ms. Vanessa Moore, 69 yo female, here today for follow-up on hypertension, CKD, hyperlipidemia, prediabetes. She has no new concerns today. She denies chest pain, heart palpitations, lower extremity edema, dyspnea. No Known Allergies    Current Outpatient Medications   Medication Sig Dispense Refill    amLODIPine (NORVASC) 10 MG tablet TAKE 1 TABLET BY MOUTH EVERY DAY 90 tablet 1    metoprolol succinate (TOPROL XL) 25 MG extended release tablet TAKE 1 TABLET BY MOUTH EVERY DAY 90 tablet 3    atorvastatin (LIPITOR) 20 MG tablet TAKE 1 TABLET BY MOUTH EVERY DAY AT NIGHT 90 tablet 0    vitamin D (CHOLECALCIFEROL) 25 MCG (1000 UT) TABS tablet Take 1 tablet by mouth daily      zoster recombinant adjuvanted vaccine [facility]) 50 MCG/0.5ML SUSR injection 0.5mL by IntraMUSCular route once now and then repeat in 2-6 months       No current facility-administered medications for this visit. Past Medical History:   Diagnosis Date    Dyslipidemia 2012    History of acute renal failure 2012    History of chickenpox     History of measles childhood    History of mumps childhood    Obesity, Class I, BMI 30-34.9 2012    White coat syndrome without diagnosis of hypertension     exacerbated by stress     Past Surgical History:   Procedure Laterality Date    BIOPSY BREAST Right 2011    benign     BREAST BIOPSY Right     us bx negative     CATARACT REMOVAL      COLONOSCOPY  3/29/2011         HYSTERECTOMY, TOTAL ABDOMINAL (CERVIX REMOVED)      with Unilateral oopherectomy, b/l salpingectomy due to ectopic pregnancy.      Family History   Problem Relation Age of Onset    Liver Disease Brother         cirrhosis    Cancer Neg Hx     Heart Disease Mother     Diabetes Mother     Stroke Father 37        heavy alcohol     Social History

## 2023-08-31 ASSESSMENT — ENCOUNTER SYMPTOMS
WHEEZING: 0
SORE THROAT: 0
EYE DISCHARGE: 0
COUGH: 0
SINUS PAIN: 0
SINUS PRESSURE: 0
SHORTNESS OF BREATH: 0
BACK PAIN: 0
ABDOMINAL PAIN: 0
BLOOD IN STOOL: 0
PHOTOPHOBIA: 0
ANAL BLEEDING: 0
FACIAL SWELLING: 0
COLOR CHANGE: 0
EYE PAIN: 0
APNEA: 0
NAUSEA: 0
CONSTIPATION: 0
RECTAL PAIN: 0
CHOKING: 0
TROUBLE SWALLOWING: 0
VOMITING: 0
EYE REDNESS: 0
DIARRHEA: 0

## 2023-09-01 ENCOUNTER — NURSE ONLY (OUTPATIENT)
Facility: CLINIC | Age: 73
End: 2023-09-01

## 2023-09-01 DIAGNOSIS — I10 ESSENTIAL HYPERTENSION: ICD-10-CM

## 2023-09-01 DIAGNOSIS — R73.03 PREDIABETES: ICD-10-CM

## 2023-09-01 DIAGNOSIS — E78.5 DYSLIPIDEMIA: ICD-10-CM

## 2023-09-01 LAB
ALBUMIN SERPL-MCNC: 4.3 G/DL (ref 3.5–5)
ALBUMIN/GLOB SERPL: 1.2 (ref 1.1–2.2)
ALP SERPL-CCNC: 162 U/L (ref 45–117)
ALT SERPL-CCNC: 26 U/L (ref 12–78)
ANION GAP SERPL CALC-SCNC: 1 MMOL/L (ref 5–15)
AST SERPL-CCNC: 24 U/L (ref 15–37)
BASOPHILS # BLD: 0.1 K/UL (ref 0–0.1)
BASOPHILS NFR BLD: 1 % (ref 0–1)
BILIRUB SERPL-MCNC: 0.7 MG/DL (ref 0.2–1)
BUN SERPL-MCNC: 12 MG/DL (ref 6–20)
BUN/CREAT SERPL: 10 (ref 12–20)
CALCIUM SERPL-MCNC: 10.2 MG/DL (ref 8.5–10.1)
CHLORIDE SERPL-SCNC: 106 MMOL/L (ref 97–108)
CHOLEST SERPL-MCNC: 154 MG/DL
CO2 SERPL-SCNC: 31 MMOL/L (ref 21–32)
CREAT SERPL-MCNC: 1.15 MG/DL (ref 0.55–1.02)
DIFFERENTIAL METHOD BLD: ABNORMAL
EOSINOPHIL # BLD: 0.1 K/UL (ref 0–0.4)
EOSINOPHIL NFR BLD: 2 % (ref 0–7)
ERYTHROCYTE [DISTWIDTH] IN BLOOD BY AUTOMATED COUNT: 14.9 % (ref 11.5–14.5)
EST. AVERAGE GLUCOSE BLD GHB EST-MCNC: 128 MG/DL
GLOBULIN SER CALC-MCNC: 3.6 G/DL (ref 2–4)
GLUCOSE SERPL-MCNC: 104 MG/DL (ref 65–100)
HBA1C MFR BLD: 6.1 % (ref 4–5.6)
HCT VFR BLD AUTO: 46 % (ref 35–47)
HDLC SERPL-MCNC: 66 MG/DL
HDLC SERPL: 2.3 (ref 0–5)
HGB BLD-MCNC: 14.9 G/DL (ref 11.5–16)
IMM GRANULOCYTES # BLD AUTO: 0 K/UL (ref 0–0.04)
IMM GRANULOCYTES NFR BLD AUTO: 0 % (ref 0–0.5)
LDLC SERPL CALC-MCNC: 73.6 MG/DL (ref 0–100)
LYMPHOCYTES # BLD: 2.5 K/UL (ref 0.8–3.5)
LYMPHOCYTES NFR BLD: 39 % (ref 12–49)
MCH RBC QN AUTO: 27 PG (ref 26–34)
MCHC RBC AUTO-ENTMCNC: 32.4 G/DL (ref 30–36.5)
MCV RBC AUTO: 83.3 FL (ref 80–99)
MONOCYTES # BLD: 0.6 K/UL (ref 0–1)
MONOCYTES NFR BLD: 9 % (ref 5–13)
NEUTS SEG # BLD: 3.2 K/UL (ref 1.8–8)
NEUTS SEG NFR BLD: 49 % (ref 32–75)
NRBC # BLD: 0 K/UL (ref 0–0.01)
NRBC BLD-RTO: 0 PER 100 WBC
PLATELET # BLD AUTO: 192 K/UL (ref 150–400)
PMV BLD AUTO: 12.7 FL (ref 8.9–12.9)
POTASSIUM SERPL-SCNC: 4.8 MMOL/L (ref 3.5–5.1)
PROT SERPL-MCNC: 7.9 G/DL (ref 6.4–8.2)
RBC # BLD AUTO: 5.52 M/UL (ref 3.8–5.2)
SODIUM SERPL-SCNC: 138 MMOL/L (ref 136–145)
TRIGL SERPL-MCNC: 72 MG/DL
VLDLC SERPL CALC-MCNC: 14.4 MG/DL
WBC # BLD AUTO: 6.5 K/UL (ref 3.6–11)

## 2023-10-12 DIAGNOSIS — E78.5 HYPERLIPIDEMIA, UNSPECIFIED: ICD-10-CM

## 2023-10-12 RX ORDER — AMLODIPINE BESYLATE 10 MG/1
10 TABLET ORAL DAILY
Qty: 90 TABLET | Refills: 0 | Status: SHIPPED | OUTPATIENT
Start: 2023-10-12

## 2023-10-12 RX ORDER — ATORVASTATIN CALCIUM 20 MG/1
TABLET, FILM COATED ORAL
Qty: 90 TABLET | Refills: 0 | Status: SHIPPED | OUTPATIENT
Start: 2023-10-12

## 2023-10-12 NOTE — TELEPHONE ENCOUNTER
Requested Prescriptions     Pending Prescriptions Disp Refills    amLODIPine (NORVASC) 10 MG tablet 90 tablet 1     Sig: Take 1 tablet by mouth daily       RX refill request from the patient/pharmacy. Patient last seen 8/28/23 with labs, and next appt. scheduled for 1/24/24.

## 2023-10-12 NOTE — TELEPHONE ENCOUNTER
PCP: DONATO Gar NP    Last appt: 8/28/2023    Future Appointments   Date Time Provider [facility] [address]   1/24/2024  3:00 PM DONATO Gar NP [facility] AM[facility]       Requested Prescriptions     Pending Prescriptions Disp Refills    atorvastatin (LIPITOR) 20 MG tablet [Pharmacy Med Name: ATORVASTATIN 20 MG TABLET] 90 tablet 0     Sig: TAKE 1 TABLET BY MOUTH EVERY DAY AT NIGHT

## 2024-01-10 RX ORDER — AMLODIPINE BESYLATE 10 MG/1
10 TABLET ORAL DAILY
Qty: 90 TABLET | Refills: 1 | Status: SHIPPED | OUTPATIENT
Start: 2024-01-10

## 2024-01-10 NOTE — TELEPHONE ENCOUNTER
PCP: Flynn Gonzales APRN - NP    Last appt: Visit date not found    Future Appointments   Date Time Provider Department Center   2/26/2024 11:00 AM Flynn Gonzales APRN - NP [facility] AMB       Requested Prescriptions     Pending Prescriptions Disp Refills    amLODIPine (NORVASC) 10 MG tablet [Pharmacy Med Name: AMLODIPINE BESYLATE 10 MG TAB] 90 tablet 0     Sig: TAKE 1 TABLET BY MOUTH EVERY DAY

## 2024-01-17 DIAGNOSIS — E78.5 HYPERLIPIDEMIA, UNSPECIFIED: ICD-10-CM

## 2024-01-17 NOTE — TELEPHONE ENCOUNTER
PCP: Flynn Gonzales APRN - NP    Last appt: 8/28/2023    Future Appointments   Date Time Provider Department Center   2/26/2024 11:00 AM Flynn Gonzales APRN - NP [facility] AMB       Requested Prescriptions     Pending Prescriptions Disp Refills    atorvastatin (LIPITOR) 20 MG tablet [Pharmacy Med Name: ATORVASTATIN 20 MG TABLET] 90 tablet 0     Sig: TAKE 1 TABLET BY MOUTH EVERY DAY AT NIGHT

## 2024-01-18 RX ORDER — ATORVASTATIN CALCIUM 20 MG/1
TABLET, FILM COATED ORAL
Qty: 90 TABLET | Refills: 1 | Status: SHIPPED | OUTPATIENT
Start: 2024-01-18

## 2024-07-15 DIAGNOSIS — E78.5 HYPERLIPIDEMIA, UNSPECIFIED: ICD-10-CM

## 2024-07-15 DIAGNOSIS — I10 ESSENTIAL (PRIMARY) HYPERTENSION: ICD-10-CM

## 2024-07-15 RX ORDER — ATORVASTATIN CALCIUM 20 MG/1
20 TABLET, FILM COATED ORAL NIGHTLY
Qty: 30 TABLET | Refills: 0 | Status: SHIPPED | OUTPATIENT
Start: 2024-07-15 | End: 2024-07-16 | Stop reason: SDUPTHER

## 2024-07-15 RX ORDER — METOPROLOL SUCCINATE 25 MG/1
25 TABLET, EXTENDED RELEASE ORAL DAILY
Qty: 30 TABLET | Refills: 0 | Status: SHIPPED | OUTPATIENT
Start: 2024-07-15 | End: 2024-07-15 | Stop reason: SDUPTHER

## 2024-07-15 RX ORDER — METOPROLOL SUCCINATE 25 MG/1
25 TABLET, EXTENDED RELEASE ORAL DAILY
Qty: 30 TABLET | Refills: 0 | Status: SHIPPED | OUTPATIENT
Start: 2024-07-15

## 2024-07-15 RX ORDER — AMLODIPINE BESYLATE 10 MG/1
10 TABLET ORAL DAILY
Qty: 30 TABLET | Refills: 0 | Status: SHIPPED | OUTPATIENT
Start: 2024-07-15 | End: 2024-07-16 | Stop reason: SDUPTHER

## 2024-07-15 RX ORDER — AMLODIPINE BESYLATE 10 MG/1
10 TABLET ORAL DAILY
Qty: 30 TABLET | Refills: 0 | Status: SHIPPED | OUTPATIENT
Start: 2024-07-15 | End: 2024-07-15 | Stop reason: SDUPTHER

## 2024-07-15 RX ORDER — ATORVASTATIN CALCIUM 20 MG/1
20 TABLET, FILM COATED ORAL NIGHTLY
Qty: 30 TABLET | Refills: 0 | Status: SHIPPED | OUTPATIENT
Start: 2024-07-15 | End: 2024-07-15 | Stop reason: SDUPTHER

## 2024-07-15 NOTE — TELEPHONE ENCOUNTER
Patient called in requesting medications. On the list for Dr. Ugarte. Patient scheduled for overdue fasting labs once she gets back in town on 8/1/24 at 10:00 am.  Pharmacy: [facility]      atorvastatin (LIPITOR) 20 MG tablet [[id number]]    Order Details  Dose, Route, Frequency: As Directed   Dispense Quantity: 90 tablet Refills: 1    Note to Pharmacy: DX Code Needed  .         Sig: TAKE 1 TABLET BY MOUTH EVERY DAY AT NIGHT       amLODIPine (NORVASC) 10 MG tablet [[id number]]    Order Details  Dose: 10 mg Route: Oral Frequency: DAILY   Dispense Quantity: 90 tablet Refills: 1          Sig: TAKE 1 TABLET BY MOUTH EVERY DAY       metoprolol succinate (TOPROL XL) 25 MG extended release tablet [[id number]]    Order Details  Dose, Route, Frequency: As Directed   Dispense Quantity: 90 tablet Refills: 3          Sig: TAKE 1 TABLET BY MOUTH EVERY DAY

## 2024-07-15 NOTE — TELEPHONE ENCOUNTER
PCP: No primary care provider on file.    Last appt: Visit date not found    Patient is on the list for Dr. Ugarte       Future Appointments   Date Time Provider Department Center   8/1/2024 10:00 AM LAB ONLY [facility] AMB       Requested Prescriptions     Pending Prescriptions Disp Refills    amLODIPine (NORVASC) 10 MG tablet 30 tablet 0     Sig: Take 1 tablet by mouth daily    atorvastatin (LIPITOR) 20 MG tablet 30 tablet 0     Sig: Take 1 tablet by mouth nightly    metoprolol succinate (TOPROL XL) 25 MG extended release tablet 30 tablet 0     Sig: Take 1 tablet by mouth daily

## 2024-07-16 DIAGNOSIS — E78.5 HYPERLIPIDEMIA, UNSPECIFIED: ICD-10-CM

## 2024-07-16 RX ORDER — ATORVASTATIN CALCIUM 20 MG/1
20 TABLET, FILM COATED ORAL NIGHTLY
Qty: 30 TABLET | Refills: 0 | Status: SHIPPED | OUTPATIENT
Start: 2024-07-16

## 2024-07-16 RX ORDER — AMLODIPINE BESYLATE 10 MG/1
10 TABLET ORAL DAILY
Qty: 30 TABLET | Refills: 0 | Status: SHIPPED | OUTPATIENT
Start: 2024-07-16

## 2024-07-22 DIAGNOSIS — I10 ESSENTIAL (PRIMARY) HYPERTENSION: ICD-10-CM

## 2024-07-22 RX ORDER — METOPROLOL SUCCINATE 25 MG/1
25 TABLET, EXTENDED RELEASE ORAL DAILY
Qty: 90 TABLET | Refills: 0 | Status: SHIPPED | OUTPATIENT
Start: 2024-07-22

## 2024-07-22 NOTE — TELEPHONE ENCOUNTER
RX refill request from the patient/pharmacy. Patient last seen 08- with labs, and next appt. scheduled for 08-  Requested Prescriptions     Pending Prescriptions Disp Refills    metoprolol succinate (TOPROL XL) 25 MG extended release tablet 90 tablet 0     Sig: Take 1 tablet by mouth daily    .

## 2024-08-06 ENCOUNTER — LAB (OUTPATIENT)
Facility: CLINIC | Age: 74
End: 2024-08-06

## 2024-08-06 DIAGNOSIS — N18.31 STAGE 3A CHRONIC KIDNEY DISEASE (HCC): ICD-10-CM

## 2024-08-06 DIAGNOSIS — I10 ESSENTIAL (PRIMARY) HYPERTENSION: Primary | ICD-10-CM

## 2024-08-06 DIAGNOSIS — R73.03 PREDIABETES: ICD-10-CM

## 2024-08-06 DIAGNOSIS — E78.5 HYPERLIPIDEMIA, UNSPECIFIED HYPERLIPIDEMIA TYPE: ICD-10-CM

## 2024-08-06 LAB
ALBUMIN SERPL-MCNC: 4.5 G/DL (ref 3.5–5)
ALBUMIN/GLOB SERPL: 1.3 (ref 1.1–2.2)
ALP SERPL-CCNC: 157 U/L (ref 45–117)
ALT SERPL-CCNC: 25 U/L (ref 12–78)
ANION GAP SERPL CALC-SCNC: 4 MMOL/L (ref 5–15)
AST SERPL-CCNC: 19 U/L (ref 15–37)
BILIRUB SERPL-MCNC: 0.9 MG/DL (ref 0.2–1)
BUN SERPL-MCNC: 11 MG/DL (ref 6–20)
BUN/CREAT SERPL: 9 (ref 12–20)
CALCIUM SERPL-MCNC: 10.5 MG/DL (ref 8.5–10.1)
CHLORIDE SERPL-SCNC: 106 MMOL/L (ref 97–108)
CHOLEST SERPL-MCNC: 163 MG/DL
CO2 SERPL-SCNC: 30 MMOL/L (ref 21–32)
CREAT SERPL-MCNC: 1.19 MG/DL (ref 0.55–1.02)
EST. AVERAGE GLUCOSE BLD GHB EST-MCNC: 131 MG/DL
GLOBULIN SER CALC-MCNC: 3.6 G/DL (ref 2–4)
GLUCOSE SERPL-MCNC: 112 MG/DL (ref 65–100)
HBA1C MFR BLD: 6.2 % (ref 4–5.6)
HDLC SERPL-MCNC: 62 MG/DL
HDLC SERPL: 2.6 (ref 0–5)
LDLC SERPL CALC-MCNC: 84 MG/DL (ref 0–100)
POTASSIUM SERPL-SCNC: 5.1 MMOL/L (ref 3.5–5.1)
PROT SERPL-MCNC: 8.1 G/DL (ref 6.4–8.2)
SODIUM SERPL-SCNC: 140 MMOL/L (ref 136–145)
TRIGL SERPL-MCNC: 85 MG/DL
VLDLC SERPL CALC-MCNC: 17 MG/DL

## 2024-09-06 DIAGNOSIS — E78.5 HYPERLIPIDEMIA, UNSPECIFIED: ICD-10-CM

## 2024-09-09 RX ORDER — ATORVASTATIN CALCIUM 20 MG/1
20 TABLET, FILM COATED ORAL NIGHTLY
Qty: 90 TABLET | Refills: 0 | Status: SHIPPED | OUTPATIENT
Start: 2024-09-09

## 2024-09-09 RX ORDER — AMLODIPINE BESYLATE 10 MG/1
10 TABLET ORAL DAILY
Qty: 90 TABLET | Refills: 0 | Status: SHIPPED | OUTPATIENT
Start: 2024-09-09

## 2024-10-20 NOTE — PROGRESS NOTES
Jessica Carson a 74 y.o. female with history of CKD3, situational anxiety, prediabetes, HTN, HLD, L carotid artery bruit presents to office today for HTN follow up.     Subjective:    Patient states that she does not have any acute concerns today, she notes that overall she feels well, she lives with her  in the [place] area.  She has 1 son who she frequently spends time with.    Hypertension  - current meds: norvasc, metoprolol  - take as prescribed: yes  - home readings: stable   - excess sodium consumption: she limits   - etOH use, nicotine use: no   - headaches, angina, vision change, SOB, dizziness: denies     Obesity  -Patient reports that she started walking for 30 minutes/day.  She notes that she has lost 5 pounds since her last office visit.  She notes that she maintains a well-balanced diet, she consumes solids, improved    CKD  - Cr 1.19, GFR 48; 8/6/24  - she limits NSAIDs    Labs completed 8/6/24     Health Maintenance   - Screening Labs (Hep C, HIV): hep C neg (2015)  - Depression screening: mood stable   - Breast cancer screening: past due   - Cervical cancer screening: phased out   - Osteoporosis screening:past due   - Colon cancer screening: cologuard 2/2023, neg   - Smoking hx:   Tobacco Use      Smoking status: Never      Smokeless tobacco: Never            Past Medical History:   Diagnosis Date    Dyslipidemia 6/19/2012    History of acute renal failure 06/19/2012    History of chickenpox     History of measles childhood    History of mumps childhood    Obesity, Class I, BMI 30-34.9 6/19/2012    White coat syndrome without diagnosis of hypertension 2005    exacerbated by stress     Past Surgical History:   Procedure Laterality Date    BIOPSY BREAST Right 12/06/2011    benign     BREAST BIOPSY Right 2012    us bx negative     CATARACT REMOVAL      COLONOSCOPY  3/29/2011         HYSTERECTOMY, TOTAL ABDOMINAL (CERVIX REMOVED)  1974    with Unilateral oopherectomy, b/l salpingectomy due

## 2024-10-23 ENCOUNTER — OFFICE VISIT (OUTPATIENT)
Facility: CLINIC | Age: 74
End: 2024-10-23

## 2024-10-23 VITALS
DIASTOLIC BLOOD PRESSURE: 82 MMHG | HEIGHT: 62 IN | SYSTOLIC BLOOD PRESSURE: 139 MMHG | BODY MASS INDEX: 34.89 KG/M2 | RESPIRATION RATE: 16 BRPM | OXYGEN SATURATION: 98 % | WEIGHT: 189.6 LBS | HEART RATE: 82 BPM

## 2024-10-23 DIAGNOSIS — Z13.820 OSTEOPOROSIS SCREENING: ICD-10-CM

## 2024-10-23 DIAGNOSIS — I10 ESSENTIAL HYPERTENSION: Primary | ICD-10-CM

## 2024-10-23 DIAGNOSIS — N18.31 STAGE 3A CHRONIC KIDNEY DISEASE (HCC): ICD-10-CM

## 2024-10-23 DIAGNOSIS — E66.811 OBESITY, CLASS I, BMI 30-34.9: ICD-10-CM

## 2024-10-23 PROBLEM — F41.8 SITUATIONAL ANXIETY: Status: RESOLVED | Noted: 2021-11-18 | Resolved: 2024-10-23

## 2024-10-23 RX ORDER — ATORVASTATIN CALCIUM 20 MG/1
20 TABLET, FILM COATED ORAL NIGHTLY
Qty: 90 TABLET | Refills: 0 | Status: SHIPPED | OUTPATIENT
Start: 2024-10-23

## 2024-10-23 RX ORDER — AMLODIPINE BESYLATE 10 MG/1
10 TABLET ORAL DAILY
Qty: 90 TABLET | Refills: 0 | Status: SHIPPED | OUTPATIENT
Start: 2024-10-23

## 2024-10-23 RX ORDER — METOPROLOL SUCCINATE 25 MG/1
25 TABLET, EXTENDED RELEASE ORAL DAILY
Qty: 90 TABLET | Refills: 0 | Status: SHIPPED | OUTPATIENT
Start: 2024-10-23

## 2024-10-23 SDOH — ECONOMIC STABILITY: FOOD INSECURITY: WITHIN THE PAST 12 MONTHS, YOU WORRIED THAT YOUR FOOD WOULD RUN OUT BEFORE YOU GOT MONEY TO BUY MORE.: NEVER TRUE

## 2024-10-23 SDOH — ECONOMIC STABILITY: INCOME INSECURITY: HOW HARD IS IT FOR YOU TO PAY FOR THE VERY BASICS LIKE FOOD, HOUSING, MEDICAL CARE, AND HEATING?: NOT HARD AT ALL

## 2024-10-23 SDOH — ECONOMIC STABILITY: FOOD INSECURITY: WITHIN THE PAST 12 MONTHS, THE FOOD YOU BOUGHT JUST DIDN'T LAST AND YOU DIDN'T HAVE MONEY TO GET MORE.: NEVER TRUE

## 2024-10-23 ASSESSMENT — PATIENT HEALTH QUESTIONNAIRE - PHQ9
SUM OF ALL RESPONSES TO PHQ QUESTIONS 1-9: 0
SUM OF ALL RESPONSES TO PHQ QUESTIONS 1-9: 0
2. FEELING DOWN, DEPRESSED OR HOPELESS: NOT AT ALL
SUM OF ALL RESPONSES TO PHQ QUESTIONS 1-9: 0
SUM OF ALL RESPONSES TO PHQ9 QUESTIONS 1 & 2: 0
1. LITTLE INTEREST OR PLEASURE IN DOING THINGS: NOT AT ALL
SUM OF ALL RESPONSES TO PHQ QUESTIONS 1-9: 0

## 2024-10-23 NOTE — PATIENT INSTRUCTIONS
Keep up the great job with your health!    Please call Central Scheduling [phone] to schedule your mammogram and DEXA bone scan

## 2024-10-23 NOTE — PROGRESS NOTES
\"Have you been to the ER, urgent care clinic since your last visit?  Hospitalized since your last visit?\"    NO    “Have you seen or consulted any other health care providers outside our system since your last visit?”    NO    Dexa Bone scan? 11/27/2018 - in chart. Would like to get another one day!    Colonoscopy? Cologuard 2/7/2023 - Notes in chart.     Mammogram? May 5/17/2022. Didn't take her expected one this year yet.

## 2024-10-23 NOTE — ASSESSMENT & PLAN NOTE
- BP in office today 139/82. Reviewed home BP log, stable readings.   - At goal BP < 140/90, continue current BP medication regimen.

## 2024-10-23 NOTE — ASSESSMENT & PLAN NOTE
BMI in office today 34.68. She has lost 5 pounds since last office visit, I congratulated her. Continue daily walking.

## 2024-12-11 ENCOUNTER — HOSPITAL ENCOUNTER (OUTPATIENT)
Facility: HOSPITAL | Age: 74
Discharge: HOME | End: 2024-12-14
Payer: MEDICARE

## 2024-12-11 VITALS — WEIGHT: 189 LBS | BODY MASS INDEX: 34.78 KG/M2 | HEIGHT: 62 IN

## 2024-12-11 DIAGNOSIS — Z12.31 VISIT FOR SCREENING MAMMOGRAM: ICD-10-CM

## 2024-12-11 PROCEDURE — 77063 BREAST TOMOSYNTHESIS BI: CPT

## 2025-01-22 DIAGNOSIS — I10 ESSENTIAL HYPERTENSION: ICD-10-CM

## 2025-01-22 RX ORDER — AMLODIPINE BESYLATE 10 MG/1
10 TABLET ORAL DAILY
Qty: 90 TABLET | Refills: 0 | Status: SHIPPED | OUTPATIENT
Start: 2025-01-22

## 2025-01-22 NOTE — TELEPHONE ENCOUNTER
PCP: Esthela Ugarte MD    Last appt: 10/23/2024    Future Appointments   Date Time Provider Department Center   4/23/2025 10:15 AM Esthela Ugarte MD [facility]       Requested Prescriptions     Pending Prescriptions Disp Refills    amLODIPine (NORVASC) 10 MG tablet [Pharmacy Med Name: AMLODIPINE BESYLATE 10 MG TAB] 90 tablet 0     Sig: TAKE 1 TABLET BY MOUTH EVERY DAY

## 2025-01-23 DIAGNOSIS — I10 ESSENTIAL (PRIMARY) HYPERTENSION: ICD-10-CM

## 2025-01-23 NOTE — TELEPHONE ENCOUNTER
PCP: Esthela Ugarte MD    Last appt: 10/23/2024    Future Appointments   Date Time Provider Department Center   4/23/2025 10:15 AM Esthela Ugarte MD [facility]       Requested Prescriptions     Pending Prescriptions Disp Refills    metoprolol succinate (TOPROL XL) 25 MG extended release tablet [Pharmacy Med Name: METOPROLOL SUCC ER 25 MG TAB] 90 tablet 0     Sig: TAKE 1 TABLET BY MOUTH EVERY DAY

## 2025-01-24 RX ORDER — METOPROLOL SUCCINATE 25 MG/1
25 TABLET, EXTENDED RELEASE ORAL DAILY
Qty: 90 TABLET | Refills: 0 | Status: SHIPPED | OUTPATIENT
Start: 2025-01-24

## 2025-04-25 DIAGNOSIS — I10 ESSENTIAL HYPERTENSION: ICD-10-CM

## 2025-04-26 DIAGNOSIS — E78.5 HYPERLIPIDEMIA, UNSPECIFIED: ICD-10-CM

## 2025-04-26 DIAGNOSIS — I10 ESSENTIAL (PRIMARY) HYPERTENSION: ICD-10-CM

## 2025-04-30 ENCOUNTER — OFFICE VISIT (OUTPATIENT)
Facility: CLINIC | Age: 75
End: 2025-04-30
Payer: MEDICARE

## 2025-04-30 VITALS
SYSTOLIC BLOOD PRESSURE: 138 MMHG | BODY MASS INDEX: 34.23 KG/M2 | OXYGEN SATURATION: 97 % | HEIGHT: 62 IN | DIASTOLIC BLOOD PRESSURE: 84 MMHG | WEIGHT: 186 LBS | HEART RATE: 96 BPM

## 2025-04-30 DIAGNOSIS — I10 ESSENTIAL HYPERTENSION: ICD-10-CM

## 2025-04-30 DIAGNOSIS — J30.1 SEASONAL ALLERGIC RHINITIS DUE TO POLLEN: ICD-10-CM

## 2025-04-30 DIAGNOSIS — E78.5 DYSLIPIDEMIA: ICD-10-CM

## 2025-04-30 DIAGNOSIS — R73.03 PREDIABETES: ICD-10-CM

## 2025-04-30 DIAGNOSIS — N18.31 STAGE 3A CHRONIC KIDNEY DISEASE (HCC): ICD-10-CM

## 2025-04-30 DIAGNOSIS — E66.811 OBESITY, CLASS I, BMI 30-34.9: Primary | ICD-10-CM

## 2025-04-30 PROCEDURE — 1160F RVW MEDS BY RX/DR IN RCRD: CPT | Performed by: STUDENT IN AN ORGANIZED HEALTH CARE EDUCATION/TRAINING PROGRAM

## 2025-04-30 PROCEDURE — G8399 PT W/DXA RESULTS DOCUMENT: HCPCS | Performed by: STUDENT IN AN ORGANIZED HEALTH CARE EDUCATION/TRAINING PROGRAM

## 2025-04-30 PROCEDURE — G8417 CALC BMI ABV UP PARAM F/U: HCPCS | Performed by: STUDENT IN AN ORGANIZED HEALTH CARE EDUCATION/TRAINING PROGRAM

## 2025-04-30 PROCEDURE — 3017F COLORECTAL CA SCREEN DOC REV: CPT | Performed by: STUDENT IN AN ORGANIZED HEALTH CARE EDUCATION/TRAINING PROGRAM

## 2025-04-30 PROCEDURE — 3075F SYST BP GE 130 - 139MM HG: CPT | Performed by: STUDENT IN AN ORGANIZED HEALTH CARE EDUCATION/TRAINING PROGRAM

## 2025-04-30 PROCEDURE — 99214 OFFICE O/P EST MOD 30 MIN: CPT | Performed by: STUDENT IN AN ORGANIZED HEALTH CARE EDUCATION/TRAINING PROGRAM

## 2025-04-30 PROCEDURE — 1036F TOBACCO NON-USER: CPT | Performed by: STUDENT IN AN ORGANIZED HEALTH CARE EDUCATION/TRAINING PROGRAM

## 2025-04-30 PROCEDURE — G8427 DOCREV CUR MEDS BY ELIG CLIN: HCPCS | Performed by: STUDENT IN AN ORGANIZED HEALTH CARE EDUCATION/TRAINING PROGRAM

## 2025-04-30 PROCEDURE — 3079F DIAST BP 80-89 MM HG: CPT | Performed by: STUDENT IN AN ORGANIZED HEALTH CARE EDUCATION/TRAINING PROGRAM

## 2025-04-30 PROCEDURE — 1123F ACP DISCUSS/DSCN MKR DOCD: CPT | Performed by: STUDENT IN AN ORGANIZED HEALTH CARE EDUCATION/TRAINING PROGRAM

## 2025-04-30 PROCEDURE — 1090F PRES/ABSN URINE INCON ASSESS: CPT | Performed by: STUDENT IN AN ORGANIZED HEALTH CARE EDUCATION/TRAINING PROGRAM

## 2025-04-30 PROCEDURE — 1159F MED LIST DOCD IN RCRD: CPT | Performed by: STUDENT IN AN ORGANIZED HEALTH CARE EDUCATION/TRAINING PROGRAM

## 2025-04-30 RX ORDER — ATORVASTATIN CALCIUM 20 MG/1
20 TABLET, FILM COATED ORAL NIGHTLY
Qty: 90 TABLET | Refills: 1 | Status: SHIPPED | OUTPATIENT
Start: 2025-04-30

## 2025-04-30 RX ORDER — METOPROLOL SUCCINATE 25 MG/1
25 TABLET, EXTENDED RELEASE ORAL DAILY
Qty: 90 TABLET | Refills: 1 | Status: SHIPPED | OUTPATIENT
Start: 2025-04-30

## 2025-04-30 RX ORDER — AMLODIPINE BESYLATE 10 MG/1
10 TABLET ORAL DAILY
Qty: 90 TABLET | Refills: 1 | Status: SHIPPED | OUTPATIENT
Start: 2025-04-30

## 2025-04-30 RX ORDER — AMLODIPINE BESYLATE 10 MG/1
10 TABLET ORAL DAILY
Qty: 90 TABLET | Refills: 0 | OUTPATIENT
Start: 2025-04-30

## 2025-04-30 SDOH — ECONOMIC STABILITY: FOOD INSECURITY: WITHIN THE PAST 12 MONTHS, THE FOOD YOU BOUGHT JUST DIDN'T LAST AND YOU DIDN'T HAVE MONEY TO GET MORE.: NEVER TRUE

## 2025-04-30 SDOH — ECONOMIC STABILITY: FOOD INSECURITY: WITHIN THE PAST 12 MONTHS, YOU WORRIED THAT YOUR FOOD WOULD RUN OUT BEFORE YOU GOT MONEY TO BUY MORE.: NEVER TRUE

## 2025-04-30 ASSESSMENT — PATIENT HEALTH QUESTIONNAIRE - PHQ9
SUM OF ALL RESPONSES TO PHQ QUESTIONS 1-9: 0
2. FEELING DOWN, DEPRESSED OR HOPELESS: NOT AT ALL
SUM OF ALL RESPONSES TO PHQ QUESTIONS 1-9: 0
SUM OF ALL RESPONSES TO PHQ QUESTIONS 1-9: 0
1. LITTLE INTEREST OR PLEASURE IN DOING THINGS: NOT AT ALL
SUM OF ALL RESPONSES TO PHQ QUESTIONS 1-9: 0

## 2025-04-30 NOTE — PROGRESS NOTES
Jessica Carson a 75 y.o. female  has a past medical history of Dyslipidemia, History of acute renal failure, History of chickenpox, History of measles, History of mumps, Obesity, Class I, BMI 30-34.9, and White coat syndrome without diagnosis of hypertension. presents to office today for CKD and prediabetes follow up.     Subjective:    History of Present Illness      The chief complaint includes monitoring blood pressure and kidney function. She reports a general sense of well-being, with no complaints of shortness of breath, chest pain, or ankle swelling. Blood pressure readings at home have consistently remained within the normal range. No use of ibuprofen is reported.    Kidney function tests from August 2023 showed a stable result around 48, which is not concerning. Blood pressure today is excellent, and kidney function has remained stable since 2023.    An active lifestyle is maintained, with regular walking exercises and no recent falls. Occasional knee stiffness is managed with exercises and topical cream application. She feels steady and stable on her feet.    Vitamin D supplementation and multivitamins are part of her regimen, although the latter is not taken daily. Her diet includes a variety of fruits such as bananas, apples, pears, strawberries, and blueberries, and green vegetables like kalyan greens, cabbage, and broccoli once or twice a week.    A history of mammograms is noted, with the most recent one causing significant discomfort and bruising. A biopsy performed in 2012 due to a suspicious finding yielded negative results. She has expressed interest in discontinuing further mammograms.    A dry cough has been present for the past 2 months, attributed to her medication. Taking the medication between 7:30 and 8:30 PM, after eating and drinking sufficient fluids, alleviates the cough. A sensation of dryness in the upper respiratory tract is managed by limiting outdoor exposure and washing her

## 2025-04-30 NOTE — PROGRESS NOTES
Chief Complaint   Patient presents with    6 Month Follow-Up         Health Maintenance Due   Topic Date Due    Shingles vaccine (1 of 2) Never done    Annual Wellness Visit (Medicare)  11/27/2023    COVID-19 Vaccine (5 - 2024-25 season) 09/01/2024    Respiratory Syncytial Virus (RSV) Pregnant or age 60 yrs+ (1 - 1-dose 75+ series) Never done         \"Have you been to the ER, urgent care clinic since your last visit?  Hospitalized since your last visit?\"    NO    “Have you seen or consulted any other health care providers outside of [facility] since your last visit?”    NO

## 2025-05-01 RX ORDER — ATORVASTATIN CALCIUM 20 MG/1
20 TABLET, FILM COATED ORAL NIGHTLY
Qty: 90 TABLET | Refills: 0 | OUTPATIENT
Start: 2025-05-01

## 2025-05-01 RX ORDER — METOPROLOL SUCCINATE 25 MG/1
25 TABLET, EXTENDED RELEASE ORAL DAILY
Qty: 90 TABLET | Refills: 0 | OUTPATIENT
Start: 2025-05-01

## 2025-05-05 PROBLEM — J30.1 SEASONAL ALLERGIC RHINITIS DUE TO POLLEN: Status: ACTIVE | Noted: 2025-05-05

## 2025-08-04 ENCOUNTER — TELEPHONE (OUTPATIENT)
Facility: CLINIC | Age: 75
End: 2025-08-04

## 2025-08-04 DIAGNOSIS — I10 ESSENTIAL HYPERTENSION: ICD-10-CM

## 2025-08-04 RX ORDER — METOPROLOL SUCCINATE 25 MG/1
25 TABLET, EXTENDED RELEASE ORAL DAILY
Qty: 90 TABLET | Refills: 1 | Status: SHIPPED | OUTPATIENT
Start: 2025-08-04